# Patient Record
Sex: FEMALE | Race: WHITE | NOT HISPANIC OR LATINO | Employment: OTHER | URBAN - METROPOLITAN AREA
[De-identification: names, ages, dates, MRNs, and addresses within clinical notes are randomized per-mention and may not be internally consistent; named-entity substitution may affect disease eponyms.]

---

## 2017-12-07 ENCOUNTER — CONVERSION ENCOUNTER (OUTPATIENT)
Dept: MAMMOGRAPHY | Facility: HOSPITAL | Age: 67
End: 2017-12-07

## 2019-02-14 ENCOUNTER — HOSPITAL ENCOUNTER (OUTPATIENT)
Dept: MAMMOGRAPHY | Facility: HOSPITAL | Age: 69
Discharge: HOME | End: 2019-02-14

## 2019-07-31 ENCOUNTER — OFFICE VISIT CONVERTED (OUTPATIENT)
Dept: SURGERY | Facility: CLINIC | Age: 69
End: 2019-07-31
Attending: NURSE PRACTITIONER

## 2019-12-06 ENCOUNTER — HOSPITAL ENCOUNTER (OUTPATIENT)
Dept: SURGERY | Facility: HOSPITAL | Age: 69
Setting detail: HOSPITAL OUTPATIENT SURGERY
Discharge: HOME | End: 2019-12-06
Attending: SURGERY

## 2020-02-17 ENCOUNTER — HOSPITAL ENCOUNTER (OUTPATIENT)
Dept: OTHER | Facility: HOSPITAL | Age: 70
Discharge: HOME | End: 2020-02-17
Attending: NURSE PRACTITIONER

## 2021-03-01 ENCOUNTER — HOSPITAL ENCOUNTER (OUTPATIENT)
Dept: VACCINE CLINIC | Facility: HOSPITAL | Age: 71
Discharge: HOME | End: 2021-03-01
Attending: INTERNAL MEDICINE

## 2021-03-22 ENCOUNTER — HOSPITAL ENCOUNTER (OUTPATIENT)
Dept: VACCINE CLINIC | Facility: HOSPITAL | Age: 71
Discharge: HOME | End: 2021-03-22
Attending: INTERNAL MEDICINE

## 2021-04-19 ENCOUNTER — HOSPITAL ENCOUNTER (OUTPATIENT)
Dept: OTHER | Facility: HOSPITAL | Age: 71
Discharge: HOME | End: 2021-04-19
Attending: NURSE PRACTITIONER

## 2021-05-15 VITALS — WEIGHT: 195.37 LBS | BODY MASS INDEX: 31.4 KG/M2 | HEIGHT: 66 IN | RESPIRATION RATE: 14 BRPM

## 2021-11-19 ENCOUNTER — HOSPITAL ENCOUNTER (EMERGENCY)
Facility: HOSPITAL | Age: 71
Discharge: HOME | End: 2021-11-19
Attending: EMERGENCY MEDICINE | Admitting: EMERGENCY MEDICINE

## 2021-11-19 VITALS
SYSTOLIC BLOOD PRESSURE: 128 MMHG | BODY MASS INDEX: 31.89 KG/M2 | TEMPERATURE: 97.8 F | WEIGHT: 198.41 LBS | HEART RATE: 57 BPM | DIASTOLIC BLOOD PRESSURE: 65 MMHG | HEIGHT: 66 IN | OXYGEN SATURATION: 96 % | RESPIRATION RATE: 18 BRPM

## 2021-11-19 DIAGNOSIS — M54.41 ACUTE RIGHT-SIDED LOW BACK PAIN WITH RIGHT-SIDED SCIATICA: Primary | ICD-10-CM

## 2021-11-19 PROCEDURE — 96372 THER/PROPH/DIAG INJ SC/IM: CPT

## 2021-11-19 PROCEDURE — 99283 EMERGENCY DEPT VISIT LOW MDM: CPT

## 2021-11-19 PROCEDURE — 25010000002 HYDROMORPHONE 1 MG/ML SOLUTION: Performed by: EMERGENCY MEDICINE

## 2021-11-19 RX ORDER — GABAPENTIN 300 MG/1
300 CAPSULE ORAL 3 TIMES DAILY
Qty: 9 CAPSULE | Refills: 0 | Status: SHIPPED | OUTPATIENT
Start: 2021-11-19

## 2021-11-19 RX ORDER — GABAPENTIN 300 MG/1
300 CAPSULE ORAL 3 TIMES DAILY
Qty: 63 CAPSULE | Refills: 0 | Status: SHIPPED | OUTPATIENT
Start: 2021-11-19 | End: 2021-11-19 | Stop reason: SDUPTHER

## 2021-11-19 RX ORDER — HYDROCODONE BITARTRATE AND ACETAMINOPHEN 5; 325 MG/1; MG/1
1 TABLET ORAL 2 TIMES DAILY PRN
Qty: 6 TABLET | Refills: 0 | Status: SHIPPED | OUTPATIENT
Start: 2021-11-19

## 2021-11-19 RX ADMIN — HYDROMORPHONE HYDROCHLORIDE 1 MG: 1 INJECTION, SOLUTION INTRAMUSCULAR; INTRAVENOUS; SUBCUTANEOUS at 06:28

## 2021-11-19 NOTE — ED PROVIDER NOTES
Subjective   Katie Rudd is a 71 y.o. female who presents to the emergency department today with complaints of intermittent worsening right hip pain. Per EMS, pt was seen at the beginning of this month by her PCP for sciatic pain where she was prescribed Prednisone and muscle relaxer. She reports taking the last of her medication last night when she then began having experiencing pain again--but worse. She goes on to complain of urinary frequency. She denies fever, numbness, incontinence, or any other pertinent sx or concerns.       History provided by:  Patient   used: No        Review of Systems   Constitutional: Negative for chills and fever.   HENT: Negative for congestion, rhinorrhea and sore throat.    Eyes: Negative for pain and visual disturbance.   Respiratory: Negative for apnea, cough, chest tightness and shortness of breath.    Cardiovascular: Negative for chest pain and palpitations.   Gastrointestinal: Negative for abdominal pain, diarrhea, nausea and vomiting.   Genitourinary: Positive for frequency. Negative for difficulty urinating and dysuria.   Musculoskeletal: Negative for joint swelling and myalgias.   Skin: Negative for color change.   Neurological: Negative for seizures and headaches.   Psychiatric/Behavioral: Negative.    All other systems reviewed and are negative.      Past Medical History:   Diagnosis Date   • Anxiety    • Bladder dysfunction    • Hyperlipidemia    • Hypertension        No Known Allergies    Past Surgical History:   Procedure Laterality Date   • HYSTERECTOMY     • NECK SURGERY     • TONSILLECTOMY     • TUBAL ABDOMINAL LIGATION         History reviewed. No pertinent family history.    Social History     Socioeconomic History   • Marital status:    Tobacco Use   • Smoking status: Never Smoker   • Smokeless tobacco: Never Used   Vaping Use   • Vaping Use: Never used   Substance and Sexual Activity   • Alcohol use: Not Currently   • Drug  use: Never   • Sexual activity: Defer         Objective   Physical Exam  Vitals and nursing note reviewed.   Constitutional:       General: She is not in acute distress.     Appearance: Normal appearance. She is not toxic-appearing.   HENT:      Head: Normocephalic and atraumatic.      Jaw: There is normal jaw occlusion.   Eyes:      General: Lids are normal.      Extraocular Movements: Extraocular movements intact.      Conjunctiva/sclera: Conjunctivae normal.      Pupils: Pupils are equal, round, and reactive to light.   Cardiovascular:      Rate and Rhythm: Normal rate and regular rhythm.      Pulses: Normal pulses.      Heart sounds: Normal heart sounds.   Pulmonary:      Effort: Pulmonary effort is normal. No respiratory distress.      Breath sounds: Normal breath sounds. No wheezing or rhonchi.   Abdominal:      General: Abdomen is flat.      Palpations: Abdomen is soft.      Tenderness: There is no abdominal tenderness. There is no guarding or rebound.   Musculoskeletal:         General: Normal range of motion.      Cervical back: Normal range of motion and neck supple.      Right lower leg: No edema.      Left lower leg: No edema.      Comments: Tender over SI joint.   Skin:     General: Skin is warm and dry.   Neurological:      Mental Status: She is alert and oriented to person, place, and time. Mental status is at baseline.   Psychiatric:         Mood and Affect: Mood normal.         Procedures         ED Course                                            MDM  Number of Diagnoses or Management Options     Amount and/or Complexity of Data Reviewed  Obtain history from someone other than the patient: yes (.)  Review and summarize past medical records: yes (Patient was recently seen by the primary care provider and started on a muscle relaxer and steroids.  These initially helped and so the patient stopped taking them only to see her pain returned a few days later.)      Differential diagnosis includes  sacroiliitis, lumbar fracture, cauda equina syndrome, and sciatica among others.    Pulse oximetry interpretation: 97% SPO2 on room air at rest.  Normal.    Given the lack of numbness weakness bowel or bladder incontinence or saddle anesthesia and given lacking history of trauma I do not think the patient's work-up will require imaging on an acute basis.    On reevaluation the patient's pain was relieved with intramuscular Dilaudid.  The patient and I had a lengthy medication conversation in which she will discontinue her muscle relaxer, finish her steroid, take Norco only as needed for a short course for severe pain, and initiate gabapentin which her PCP will reevaluate in about a week to determine if she needs refills/dosage changes.  We discussed the need for not only medications but also physical therapy which can be arranged through the primary care provider and if that fails a referral to MRI/neurosurgery.  Final diagnoses:   Acute right-sided low back pain with right-sided sciatica       Documentation assistance provided by tyler Menendez.  Information recorded by the tyler was done at my direction and has been verified and validated by me.     Filomena Menendez  11/19/21 0614       Deepak Rodriges DO  11/19/21 0649

## 2022-02-14 ENCOUNTER — TRANSCRIBE ORDERS (OUTPATIENT)
Dept: ADMINISTRATIVE | Facility: HOSPITAL | Age: 72
End: 2022-02-14

## 2022-02-14 DIAGNOSIS — Z12.31 SCREENING MAMMOGRAM, ENCOUNTER FOR: Primary | ICD-10-CM

## 2022-02-14 DIAGNOSIS — Z78.0 POST-MENOPAUSAL: ICD-10-CM

## 2022-04-20 ENCOUNTER — HOSPITAL ENCOUNTER (OUTPATIENT)
Dept: BONE DENSITY | Facility: HOSPITAL | Age: 72
Discharge: HOME | End: 2022-04-20

## 2022-04-20 ENCOUNTER — HOSPITAL ENCOUNTER (OUTPATIENT)
Dept: MAMMOGRAPHY | Facility: HOSPITAL | Age: 72
Discharge: HOME | End: 2022-04-20

## 2022-04-20 DIAGNOSIS — Z12.31 SCREENING MAMMOGRAM, ENCOUNTER FOR: ICD-10-CM

## 2022-04-20 DIAGNOSIS — Z78.0 POST-MENOPAUSAL: ICD-10-CM

## 2022-04-20 PROCEDURE — 77067 SCR MAMMO BI INCL CAD: CPT

## 2022-04-20 PROCEDURE — 77080 DXA BONE DENSITY AXIAL: CPT

## 2023-03-10 ENCOUNTER — TRANSCRIBE ORDERS (OUTPATIENT)
Dept: ADMINISTRATIVE | Facility: HOSPITAL | Age: 73
End: 2023-03-10
Payer: MEDICARE

## 2023-03-10 DIAGNOSIS — Z12.31 SCREENING MAMMOGRAM FOR BREAST CANCER: Primary | ICD-10-CM

## 2023-06-16 ENCOUNTER — TRANSCRIBE ORDERS (OUTPATIENT)
Dept: ADMINISTRATIVE | Facility: HOSPITAL | Age: 73
End: 2023-06-16
Payer: MEDICARE

## 2023-06-16 ENCOUNTER — HOSPITAL ENCOUNTER (OUTPATIENT)
Dept: GENERAL RADIOLOGY | Facility: HOSPITAL | Age: 73
Discharge: HOME | End: 2023-06-16
Payer: MEDICARE

## 2023-06-16 DIAGNOSIS — M25.561 RIGHT KNEE PAIN, UNSPECIFIED CHRONICITY: ICD-10-CM

## 2023-06-16 DIAGNOSIS — M25.561 RIGHT KNEE PAIN, UNSPECIFIED CHRONICITY: Primary | ICD-10-CM

## 2023-06-16 PROCEDURE — 73562 X-RAY EXAM OF KNEE 3: CPT

## 2023-07-27 ENCOUNTER — HOSPITAL ENCOUNTER (EMERGENCY)
Facility: HOSPITAL | Age: 73
Discharge: HOME | End: 2023-07-27
Attending: EMERGENCY MEDICINE
Payer: MEDICARE

## 2023-07-27 ENCOUNTER — APPOINTMENT (OUTPATIENT)
Dept: GENERAL RADIOLOGY | Facility: HOSPITAL | Age: 73
End: 2023-07-27
Payer: MEDICARE

## 2023-07-27 VITALS
WEIGHT: 198.41 LBS | RESPIRATION RATE: 16 BRPM | SYSTOLIC BLOOD PRESSURE: 100 MMHG | HEART RATE: 55 BPM | BODY MASS INDEX: 31.89 KG/M2 | OXYGEN SATURATION: 95 % | TEMPERATURE: 97.9 F | HEIGHT: 66 IN | DIASTOLIC BLOOD PRESSURE: 71 MMHG

## 2023-07-27 DIAGNOSIS — M54.12 CERVICAL RADICULOPATHY: Primary | ICD-10-CM

## 2023-07-27 LAB
ALBUMIN SERPL-MCNC: 4.4 G/DL (ref 3.5–5.2)
ALBUMIN/GLOB SERPL: 1.5 G/DL
ALP SERPL-CCNC: 91 U/L (ref 39–117)
ALT SERPL W P-5'-P-CCNC: 25 U/L (ref 1–33)
ANION GAP SERPL CALCULATED.3IONS-SCNC: 9.5 MMOL/L (ref 5–15)
AST SERPL-CCNC: 17 U/L (ref 1–32)
BASOPHILS # BLD AUTO: 0.01 10*3/MM3 (ref 0–0.2)
BASOPHILS NFR BLD AUTO: 0.1 % (ref 0–1.5)
BILIRUB SERPL-MCNC: 0.8 MG/DL (ref 0–1.2)
BUN SERPL-MCNC: 17 MG/DL (ref 8–23)
BUN/CREAT SERPL: 20.2 (ref 7–25)
CALCIUM SPEC-SCNC: 9.6 MG/DL (ref 8.6–10.5)
CHLORIDE SERPL-SCNC: 102 MMOL/L (ref 98–107)
CO2 SERPL-SCNC: 27.5 MMOL/L (ref 22–29)
CREAT SERPL-MCNC: 0.84 MG/DL (ref 0.57–1)
DEPRECATED RDW RBC AUTO: 47.9 FL (ref 37–54)
EGFRCR SERPLBLD CKD-EPI 2021: 73.5 ML/MIN/1.73
EOSINOPHIL # BLD AUTO: 0.02 10*3/MM3 (ref 0–0.4)
EOSINOPHIL NFR BLD AUTO: 0.2 % (ref 0.3–6.2)
ERYTHROCYTE [DISTWIDTH] IN BLOOD BY AUTOMATED COUNT: 14.7 % (ref 12.3–15.4)
GLOBULIN UR ELPH-MCNC: 2.9 GM/DL
GLUCOSE SERPL-MCNC: 106 MG/DL (ref 65–99)
HCT VFR BLD AUTO: 48.6 % (ref 34–46.6)
HGB BLD-MCNC: 16.2 G/DL (ref 12–15.9)
IMM GRANULOCYTES # BLD AUTO: 0.03 10*3/MM3 (ref 0–0.05)
IMM GRANULOCYTES NFR BLD AUTO: 0.3 % (ref 0–0.5)
LYMPHOCYTES # BLD AUTO: 1.76 10*3/MM3 (ref 0.7–3.1)
LYMPHOCYTES NFR BLD AUTO: 20.5 % (ref 19.6–45.3)
MCH RBC QN AUTO: 29.6 PG (ref 26.6–33)
MCHC RBC AUTO-ENTMCNC: 33.3 G/DL (ref 31.5–35.7)
MCV RBC AUTO: 88.7 FL (ref 79–97)
MONOCYTES # BLD AUTO: 0.49 10*3/MM3 (ref 0.1–0.9)
MONOCYTES NFR BLD AUTO: 5.7 % (ref 5–12)
NEUTROPHILS NFR BLD AUTO: 6.27 10*3/MM3 (ref 1.7–7)
NEUTROPHILS NFR BLD AUTO: 73.2 % (ref 42.7–76)
NRBC BLD AUTO-RTO: 0 /100 WBC (ref 0–0.2)
NT-PROBNP SERPL-MCNC: 184.6 PG/ML (ref 0–900)
PLATELET # BLD AUTO: 205 10*3/MM3 (ref 140–450)
PMV BLD AUTO: 9.6 FL (ref 6–12)
POTASSIUM SERPL-SCNC: 3.9 MMOL/L (ref 3.5–5.2)
PROT SERPL-MCNC: 7.3 G/DL (ref 6–8.5)
RBC # BLD AUTO: 5.48 10*6/MM3 (ref 3.77–5.28)
SODIUM SERPL-SCNC: 139 MMOL/L (ref 136–145)
TROPONIN T SERPL HS-MCNC: 7 NG/L
WBC NRBC COR # BLD: 8.58 10*3/MM3 (ref 3.4–10.8)

## 2023-07-27 PROCEDURE — 36415 COLL VENOUS BLD VENIPUNCTURE: CPT

## 2023-07-27 PROCEDURE — 84484 ASSAY OF TROPONIN QUANT: CPT | Performed by: EMERGENCY MEDICINE

## 2023-07-27 PROCEDURE — 83880 ASSAY OF NATRIURETIC PEPTIDE: CPT | Performed by: EMERGENCY MEDICINE

## 2023-07-27 PROCEDURE — 80053 COMPREHEN METABOLIC PANEL: CPT | Performed by: EMERGENCY MEDICINE

## 2023-07-27 PROCEDURE — 93010 ELECTROCARDIOGRAM REPORT: CPT | Performed by: INTERNAL MEDICINE

## 2023-07-27 PROCEDURE — 99282 EMERGENCY DEPT VISIT SF MDM: CPT

## 2023-07-27 PROCEDURE — 93005 ELECTROCARDIOGRAM TRACING: CPT | Performed by: EMERGENCY MEDICINE

## 2023-07-27 PROCEDURE — 72050 X-RAY EXAM NECK SPINE 4/5VWS: CPT

## 2023-07-27 PROCEDURE — 93005 ELECTROCARDIOGRAM TRACING: CPT

## 2023-07-27 PROCEDURE — 85025 COMPLETE CBC W/AUTO DIFF WBC: CPT | Performed by: EMERGENCY MEDICINE

## 2023-07-27 RX ORDER — MELOXICAM 7.5 MG/1
1 TABLET ORAL DAILY
COMMUNITY
Start: 2023-06-07

## 2023-07-27 RX ORDER — PREDNISONE 50 MG/1
50 TABLET ORAL DAILY
Qty: 5 TABLET | Refills: 0 | Status: SHIPPED | OUTPATIENT
Start: 2023-07-27 | End: 2023-08-01

## 2023-07-27 RX ORDER — ATORVASTATIN CALCIUM 20 MG/1
20 TABLET, FILM COATED ORAL DAILY
COMMUNITY

## 2023-07-27 RX ORDER — AMLODIPINE BESYLATE 5 MG/1
5 TABLET ORAL DAILY
COMMUNITY

## 2023-07-27 RX ORDER — OXYBUTYNIN CHLORIDE 10 MG/1
1 TABLET, EXTENDED RELEASE ORAL DAILY
COMMUNITY
Start: 2023-06-07

## 2023-07-27 NOTE — DISCHARGE INSTRUCTIONS
Return to the emergency department as needed for worsening of symptoms.  Today your cardiac work-up shows no evidence of heart attack.

## 2023-07-27 NOTE — ED PROVIDER NOTES
"Time: 1:38 PM EDT  Date of encounter:  7/27/2023  Independent Historian/Clinical History and Information was obtained by:   Patient    History is limited by: N/A    Chief Complaint   Patient presents with    Nausea    Neck Pain         History of Present Illness:  Patient is a 73 y.o. year old female who presents to the emergency department for evaluation of neck pain that started 3 days ago.  Patient states her pain radiates down to her left upper extremity admits numbness and tingling with a burning sensation.  Patient states the pain is intermittent.  Patient was seen by her PCP earlier today and her PCP wanted her to be worked up for possible MI.  Patient denies any chest pain or shortness of breath at this time.  Patient admits to nausea but denies vomiting.  Patient states that she does have occasional headache whenever her neck pain gets worse.  Patient denies fever.  Patient denies neck stiffness.  Patient denies ever having left upper extremity weakness and currently has no numbness tingling.  She states \"right now my arm is fine\".    HPI    Patient Care Team  Primary Care Provider: Jesu Tierney MD    Past Medical History:     No Known Allergies  Past Medical History:   Diagnosis Date    Anxiety     Bladder dysfunction     Hyperlipidemia     Hypertension      Past Surgical History:   Procedure Laterality Date    BREAST BIOPSY Left     HYSTERECTOMY      NECK SURGERY      TONSILLECTOMY      TUBAL ABDOMINAL LIGATION       Family History   Problem Relation Age of Onset    Breast cancer Mother        Home Medications:  Prior to Admission medications    Medication Sig Start Date End Date Taking? Authorizing Provider   meloxicam (MOBIC) 7.5 MG tablet Take 1 tablet by mouth Daily. 6/7/23  Yes Provider, MD [place]   oxybutynin XL (DITROPAN-XL) 10 MG 24 hr tablet Take 1 tablet by mouth Daily. 6/7/23  Yes Provider, MD [place]   amLODIPine (NORVASC) 5 MG tablet Take 1 tablet by mouth Daily.    Provider, " "MD [place]   atorvastatin (LIPITOR) 20 MG tablet Take 1 tablet by mouth Daily.    Provider, MD [place]   sertraline (ZOLOFT) 50 MG tablet Take 1 tablet by mouth Daily.    Provider, MD [place]   gabapentin (NEURONTIN) 300 MG capsule Take 1 capsule by mouth 3 (Three) Times a Day. 11/19/21 7/27/23  Deepak Rodriges DO   HYDROcodone-acetaminophen (NORCO) 5-325 MG per tablet Take 1 tablet by mouth 2 (Two) Times a Day As Needed for Severe Pain . 11/19/21 7/27/23  Deepak Rodriges DO        Social History:   Social History     Tobacco Use    Smoking status: Never    Smokeless tobacco: Never   Vaping Use    Vaping Use: Never used   Substance Use Topics    Alcohol use: Not Currently    Drug use: Never         Review of Systems:  Review of Systems   Constitutional:  Negative for chills and fever.   HENT:  Negative for congestion, rhinorrhea and sore throat.    Eyes:  Negative for photophobia.   Respiratory:  Negative for apnea, cough, chest tightness and shortness of breath.    Cardiovascular:  Negative for chest pain and palpitations.   Gastrointestinal:  Negative for abdominal pain, diarrhea, nausea and vomiting.   Endocrine: Negative.    Genitourinary:  Negative for difficulty urinating and dysuria.   Musculoskeletal:  Positive for neck pain. Negative for back pain, joint swelling and myalgias.   Skin:  Negative for color change and wound.   Allergic/Immunologic: Negative.    Neurological:  Positive for numbness. Negative for seizures, weakness and headaches.   Psychiatric/Behavioral: Negative.     All other systems reviewed and are negative.     Physical Exam:  /71   Pulse 55   Temp 97.9 °F (36.6 °C) (Oral)   Resp 16   Ht 167.6 cm (66\")   Wt 90 kg (198 lb 6.6 oz)   SpO2 95%   BMI 32.02 kg/m²         Physical Exam  Vitals and nursing note reviewed.   Constitutional:       General: She is awake.      Appearance: Normal appearance.   HENT:      Head: Normocephalic and atraumatic.      Nose: Nose " normal.      Mouth/Throat:      Mouth: Mucous membranes are moist.   Eyes:      Extraocular Movements: Extraocular movements intact.      Pupils: Pupils are equal, round, and reactive to light.   Cardiovascular:      Rate and Rhythm: Normal rate and regular rhythm.      Heart sounds: Normal heart sounds.   Pulmonary:      Effort: Pulmonary effort is normal. No respiratory distress.      Breath sounds: Normal breath sounds. No wheezing, rhonchi or rales.   Abdominal:      General: Bowel sounds are normal.      Palpations: Abdomen is soft.      Tenderness: There is no abdominal tenderness. There is no guarding or rebound.      Comments: No rigidity   Musculoskeletal:         General: No tenderness. Normal range of motion.      Cervical back: Normal range of motion and neck supple.   Skin:     General: Skin is warm and dry.      Coloration: Skin is not jaundiced.   Neurological:      General: No focal deficit present.      Mental Status: She is alert and oriented to person, place, and time. Mental status is at baseline.      Sensory: Sensation is intact.      Motor: Motor function is intact.      Coordination: Coordination is intact.   Psychiatric:         Attention and Perception: Attention and perception normal.         Mood and Affect: Mood and affect normal.         Speech: Speech normal.         Behavior: Behavior normal.         Judgment: Judgment normal.                    Procedures:  Procedures      Medical Decision Making:      Comorbidities that affect care:    Hypertension, hyperlipidemia, anxiety    External Notes reviewed:    None      The following orders were placed and all results were independently analyzed by me:  Orders Placed This Encounter   Procedures    XR Spine Cervical Complete 4 or 5 View    Comprehensive Metabolic Panel    BNP    Single High Sensitivity Troponin T    CBC Auto Differential    ECG 12 Lead Other; left arm pain    CBC & Differential       Medications Given in the Emergency  Department:  Medications - No data to display     ED Course:    The patient was initially evaluated in the triage area where orders were placed. The patient was later dispositioned by Héctor Clarke MD.      The patient was advised to stay for completion of workup which includes but is not limited to communication of labs and radiological results, reassessment and plan. The patient was advised that leaving prior to disposition by a provider could result in critical findings that are not communicated to the patient.     ED Course as of 07/27/23 1513   Thu Jul 27, 2023   1507 I have personally interpreted the EKG today and it shows no evidence of any acute ischemia or heart arrhythmia. [RP]      ED Course User Index  [RP] Héctor Clarke MD       Labs:    Lab Results (last 24 hours)       Procedure Component Value Units Date/Time    CBC & Differential [[id number]]  (Abnormal) Collected: 07/27/23 1335    Specimen: Blood Updated: 07/27/23 1352    Narrative:      The following orders were created for panel order CBC & Differential.  Procedure                               Abnormality         Status                     ---------                               -----------         ------                     CBC Auto Differential[[id number]]        Abnormal            Final result                 Please view results for these tests on the individual orders.    Comprehensive Metabolic Panel [[id number]]  (Abnormal) Collected: 07/27/23 1335    Specimen: Blood Updated: 07/27/23 1410     Glucose 106 mg/dL      BUN 17 mg/dL      Creatinine 0.84 mg/dL      Sodium 139 mmol/L      Potassium 3.9 mmol/L      Chloride 102 mmol/L      CO2 27.5 mmol/L      Calcium 9.6 mg/dL      Total Protein 7.3 g/dL      Albumin 4.4 g/dL      ALT (SGPT) 25 U/L      AST (SGOT) 17 U/L      Alkaline Phosphatase 91 U/L      Total Bilirubin 0.8 mg/dL      Globulin 2.9 gm/dL      A/G Ratio 1.5 g/dL      BUN/Creatinine Ratio 20.2     Anion Gap 9.5 mmol/L       eGFR 73.5 mL/min/1.73     Narrative:      GFR Normal >60  Chronic Kidney Disease <60  Kidney Failure <15    The GFR formula is only valid for adults with stable renal function between ages 18 and 70.    BNP [[id number]]  (Normal) Collected: 07/27/23 1335    Specimen: Blood Updated: 07/27/23 1409     proBNP 184.6 pg/mL     Narrative:      Among patients with dyspnea, NT-proBNP is highly sensitive for the detection of acute congestive heart failure. In addition NT-proBNP of <300 pg/ml effectively rules out acute congestive heart failure with 99% negative predictive value.      Single High Sensitivity Troponin T [[id number]]  (Normal) Collected: 07/27/23 1335    Specimen: Blood Updated: 07/27/23 1410     HS Troponin T 7 ng/L     Narrative:      High Sensitive Troponin T Reference Range:  <10.0 ng/L- Negative Female for AMI  <15.0 ng/L- Negative Male for AMI  >=10 - Abnormal Female indicating possible myocardial injury.  >=15 - Abnormal Male indicating possible myocardial injury.   Clinicians would have to utilize clinical acumen, EKG, Troponin, and serial changes to determine if it is an Acute Myocardial Infarction or myocardial injury due to an underlying chronic condition.         CBC Auto Differential [[id number]]  (Abnormal) Collected: 07/27/23 1335    Specimen: Blood Updated: 07/27/23 1352     WBC 8.58 10*3/mm3      RBC 5.48 10*6/mm3      Hemoglobin 16.2 g/dL      Hematocrit 48.6 %      MCV 88.7 fL      MCH 29.6 pg      MCHC 33.3 g/dL      RDW 14.7 %      RDW-SD 47.9 fl      MPV 9.6 fL      Platelets 205 10*3/mm3      Neutrophil % 73.2 %      Lymphocyte % 20.5 %      Monocyte % 5.7 %      Eosinophil % 0.2 %      Basophil % 0.1 %      Immature Grans % 0.3 %      Neutrophils, Absolute 6.27 10*3/mm3      Lymphocytes, Absolute 1.76 10*3/mm3      Monocytes, Absolute 0.49 10*3/mm3      Eosinophils, Absolute 0.02 10*3/mm3      Basophils, Absolute 0.01 10*3/mm3      Immature Grans, Absolute 0.03 10*3/mm3      nRBC 0.0  /100 WBC              Imaging:    XR Spine Cervical Complete 4 or 5 View    Result Date: 7/27/2023  PROCEDURE: XR SPINE CERVICAL COMPLETE 4 OR 5 VW  COMPARISON: [facility], , C-SPINE 2 OR 3 VIEWS, 7/13/2016, 14:17.  INDICATIONS: WORSENING NECK PAIN X FEW DAYS - HISTORY OF CERVICAL FUSION - NO INJURY  FINDINGS:  The lateral masses of C1 are symmetrically aligned on the lateral masses of C2.  The dens is intact.  There is normal AP and medial lateral alignment of the cervical spine.  There is anterior cervical discectomy and fusion at the C5-C6 level.  There is endplate osseous spurring at the C3-C4, C4-C5, and C6-C7 level.  There is bilateral degenerative facet arthropathy.  Mild osseous neural foraminal narrowing at the C4-C5 level bilaterally.  The spinous processes appear intact.  Prevertebral soft tissues appear normal in thickness.        1. Postsurgical changes of anterior cervical discectomy and fusion at C5-C6. 2. Degenerative endplate osseous spurring at C3-C4, C4-C5, and C6-C7.  Mild uncovertebral spurring with mild bilateral neural foraminal narrowing at C4-C5.      ALAN CARRASCO MD       Electronically Signed and Approved By: ALAN CARRASCO MD on 7/27/2023 at 14:38                Differential Diagnosis and Discussion:      Extremity Pain: Differential diagnosis includes but is not limited to soft tissue sprain, tendonitis, tendon injury, dislocation, fracture, deep vein thrombosis, arterial insufficiency, osteoarthritis, bursitis, and ligamentous damage.    All labs were reviewed and interpreted by me.  All X-rays impressions were independently interpreted by me.  EKG was interpreted by me.    [facility]           Patient Care Considerations:    MRI: I considered ordering an MRI however patient has no focal neurologic deficits.      Consultants/Shared Management Plan:    None    Social Determinants of Health:    Patient is independent, reliable, and has access to care.       Disposition and  Care Coordination:    Discharged: The patient is suitable and stable for discharge with no need for consideration of observation or admission.    I have explained the patient´s condition, diagnoses and treatment plan based on the information available to me at this time. I have answered questions and addressed any concerns. The patient has a good  understanding of the patient´s diagnosis, condition, and treatment plan as can be expected at this point. The vital signs have been stable. The patient´s condition is stable and appropriate for discharge from the emergency department.      The patient will pursue further outpatient evaluation with the primary care physician or other designated or consulting physician as outlined in the discharge instructions. They are agreeable to this plan of care and follow-up instructions have been explained in detail. The patient has received these instructions in written format and have expressed an understanding of the discharge instructions. The patient is aware that any significant change in condition or worsening of symptoms should prompt an immediate return to this or the closest emergency department or call to 911.    Final diagnoses:   Cervical radiculopathy        ED Disposition       ED Disposition   Discharge    Condition   Stable    Comment   --               This medical record created using voice recognition software.             Héctor Clarke MD  07/27/23 5367

## 2023-07-29 LAB — QT INTERVAL: 421 MS

## 2023-08-21 ENCOUNTER — PREP FOR SURGERY (OUTPATIENT)
Dept: OTHER | Facility: HOSPITAL | Age: 73
End: 2023-08-21
Payer: MEDICARE

## 2023-08-21 ENCOUNTER — OFFICE VISIT (OUTPATIENT)
Dept: SURGERY | Facility: CLINIC | Age: 73
End: 2023-08-21
Payer: MEDICARE

## 2023-08-21 VITALS — HEART RATE: 66 BPM | HEIGHT: 66 IN | BODY MASS INDEX: 31.5 KG/M2 | WEIGHT: 196 LBS

## 2023-08-21 DIAGNOSIS — Z12.11 SCREENING FOR MALIGNANT NEOPLASM OF COLON: Primary | ICD-10-CM

## 2023-08-21 DIAGNOSIS — Z86.010 HISTORY OF COLONIC POLYPS: ICD-10-CM

## 2023-08-21 PROBLEM — Z86.0100 HISTORY OF COLONIC POLYPS: Status: ACTIVE | Noted: 2023-08-21

## 2023-08-21 PROCEDURE — 1160F RVW MEDS BY RX/DR IN RCRD: CPT | Performed by: NURSE PRACTITIONER

## 2023-08-21 PROCEDURE — 1159F MED LIST DOCD IN RCRD: CPT | Performed by: NURSE PRACTITIONER

## 2023-08-21 PROCEDURE — S0260 H&P FOR SURGERY: HCPCS | Performed by: NURSE PRACTITIONER

## 2023-08-21 RX ORDER — SODIUM CHLORIDE 0.9 % (FLUSH) 0.9 %
3 SYRINGE (ML) INJECTION EVERY 12 HOURS SCHEDULED
OUTPATIENT
Start: 2023-08-21

## 2023-08-21 RX ORDER — SODIUM CHLORIDE 0.9 % (FLUSH) 0.9 %
10 SYRINGE (ML) INJECTION AS NEEDED
OUTPATIENT
Start: 2023-08-21

## 2023-08-21 RX ORDER — SODIUM CHLORIDE 9 MG/ML
40 INJECTION, SOLUTION INTRAVENOUS AS NEEDED
OUTPATIENT
Start: 2023-08-21

## 2023-08-21 RX ORDER — SODIUM PICOSULFATE, MAGNESIUM OXIDE, AND ANHYDROUS CITRIC ACID 10; 3.5; 12 MG/160ML; G/160ML; G/160ML
160 LIQUID ORAL ONCE
Qty: 2 ML | Refills: 0 | Status: SHIPPED | OUTPATIENT
Start: 2023-08-21 | End: 2023-08-21

## 2023-08-21 NOTE — PROGRESS NOTES
Chief Complaint: Colonoscopy (consult)    Subjective      Colonoscopy consultation       History of Present Illness  Katie Rudd is a 73 y.o. female presents to [facility] for colonoscopy consultation.    Patient presents today without complaints for screening colonoscopy.  She denies any abdominal pain, change in bowel habit, or rectal bleeding.  Denies any family history of colorectal cancer.  Admits to history of colonic polyps.    12/19: Colonoscopy ([facility]):  Ascending - tubular adenoma; Descending: tubular adenoma.     5/17: Colonoscopy ([place]): Ascending-tubular adenoma; hepatic flexure-tubular adenoma; transverse- 2 tubular adenomas.  All stool studies normal.    3/15: Colonoscopy ([place]): Ascending-tubular adenoma; cecum-tubular adenoma; diverticulosis; Hemorrhoids.     8/04: Colonoscopy ([place]): Diverticulosis; chronic hemorrhoid disease.   Objective     Past Medical History:   Diagnosis Date    Anxiety     Bladder dysfunction     Hyperlipidemia     Hypertension        Past Surgical History:   Procedure Laterality Date    BREAST BIOPSY Left     HYSTERECTOMY      NECK SURGERY      TONSILLECTOMY      TUBAL ABDOMINAL LIGATION         Outpatient Medications Marked as Taking for the 8/21/23 encounter (Office Visit) with Eleanor Hunter APRN   Medication Sig Dispense Refill    amLODIPine (NORVASC) 5 MG tablet Take 1 tablet by mouth Daily.      atorvastatin (LIPITOR) 20 MG tablet Take 1 tablet by mouth Daily.      meloxicam (MOBIC) 7.5 MG tablet Take 1 tablet by mouth Daily.      oxybutynin XL (DITROPAN-XL) 10 MG 24 hr tablet Take 1 tablet by mouth Daily.      sertraline (ZOLOFT) 50 MG tablet Take 1 tablet by mouth Daily.         No Known Allergies     Family History   Problem Relation Age of Onset    Breast cancer Mother        Social History     Socioeconomic History    Marital status:    Tobacco Use    Smoking status: Never    Smokeless tobacco: Never   Vaping  "Use    Vaping Use: Never used   Substance and Sexual Activity    Alcohol use: Not Currently    Drug use: Never    Sexual activity: Defer       Review of Systems   Constitutional:  Negative for chills and fever.   Gastrointestinal:  Negative for abdominal distention, abdominal pain, anal bleeding, blood in stool, constipation, diarrhea and rectal pain.      Vital Signs:   Pulse 66   Ht 167.6 cm (66\")   Wt 88.9 kg (196 lb)   BMI 31.64 kg/mý      Physical Exam  Vitals and nursing note reviewed.   Constitutional:       General: She is not in acute distress.     Appearance: Normal appearance.   HENT:      Head: Normocephalic.   Cardiovascular:      Rate and Rhythm: Normal rate.   Pulmonary:      Effort: Pulmonary effort is normal.      Breath sounds: No stridor.   Abdominal:      Palpations: Abdomen is soft.      Tenderness: There is no guarding.   Musculoskeletal:         General: No deformity. Normal range of motion.   Skin:     General: Skin is warm and dry.      Coloration: Skin is not jaundiced.   Neurological:      General: No focal deficit present.      Mental Status: She is alert and oriented to person, place, and time.   Psychiatric:         Mood and Affect: Mood normal.         Thought Content: Thought content normal.        Result Review :          []  Laboratory  []  Radiology  []  Pathology  []  Microbiology  []  EKG/Telemetry   []  Cardiology/Vascular   []  Old records  I spent 15 minutes caring for kirsten on this date of service. This time includes time spent by me in the following activities: reviewing tests, obtaining and/or reviewing a separately obtained history, performing a medically appropriate examination and/or evaluation, ordering medications, tests, or procedures, and documenting information in the medical record.     Assessment and Plan    Diagnoses and all orders for this visit:    1. Screening for malignant neoplasm of colon (Primary)    2. History of colonic polyps    Other orders  -     " Sod Picosulfate-Mag Ox-Cit Acd (Clenpiq) 10-3.5-12 MG-GM -GM/160ML solution; Take 160 mL by mouth 1 (One) Time for 1 dose.  Dispense: 2 mL; Refill: 0        Follow Up   Return for Schedule colonoscopy with Dr. Garcia on 1/24/2023 at [facility].    Hospital arrival time: 0600.    Possible risks/complications, benefits, and alternatives to surgical or invasive procedures have been explained to patient and/or legal guardian.    Patient has been evaluated and can tolerate anesthesia and/or sedation. Risks, benefits, and alternatives to anesthesia and sedation have been explained to the patient and/or legal guardian. Patient verbalizes understanding and is willing to proceed with the above plan.     Patient was given instructions and counseling regarding her condition or for health maintenance advice. Please see specific information pulled into the AVS if appropriate.

## 2023-09-01 ENCOUNTER — TELEPHONE (OUTPATIENT)
Dept: SURGERY | Facility: CLINIC | Age: 73
End: 2023-09-01
Payer: MEDICARE

## 2023-09-01 NOTE — TELEPHONE ENCOUNTER
[facility] CALLED, THEY DO NOT HAVE THE CLENPIQ WE SENT IN . CAN YOU CALL THE PATIENT WITH ANOTHER BOWEL PREP?

## 2023-09-06 RX ORDER — POLYETHYLENE GLYCOL 3350 17 G/17G
POWDER, FOR SOLUTION ORAL
Qty: 238 PACKET | Refills: 0 | Status: SHIPPED | OUTPATIENT
Start: 2023-09-06

## 2023-09-06 NOTE — TELEPHONE ENCOUNTER
I tried calling pt to let her know. No answer. No VM that picked up either. I will send her the Miralax bowel prep in the mail.

## 2024-01-17 NOTE — PRE-PROCEDURE INSTRUCTIONS
"Instructed on date and arrival time of 0600. Come to entrance \"C\". Must have  over age 18 to drive home.  May have two visitors; however, children under 12 must stay in waiting room.  Discussed clear liquid diet (no red or purple), bowel prep, and NPO.  May take medications as usual except for blood thinners, diabetic medications, and weight loss medications.  Bring list of medications.  Verbalized understanding of instructions given.  Instructed to call for questions or concerns.  "

## 2024-01-23 ENCOUNTER — ANESTHESIA EVENT (OUTPATIENT)
Dept: GASTROENTEROLOGY | Facility: HOSPITAL | Age: 74
End: 2024-01-23
Payer: MEDICARE

## 2024-01-23 NOTE — ANESTHESIA PREPROCEDURE EVALUATION
Anesthesia Evaluation     Patient summary reviewed and Nursing notes reviewed   NPO Solid Status: > 8 hours  NPO Liquid Status: > 4 hours           Airway   Mallampati: I  TM distance: >3 FB  Neck ROM: full  No difficulty expected  Dental - normal exam     Pulmonary     breath sounds clear to auscultation  Cardiovascular   Exercise tolerance: good (4-7 METS)    ECG reviewed  Rhythm: regular  Rate: normal    (+) hypertension well controlled, hyperlipidemia      Neuro/Psych  (+) psychiatric history Anxiety  GI/Hepatic/Renal/Endo      Musculoskeletal     Abdominal    Substance History      OB/GYN          Other        ROS/Med Hx Other: EKG 07/29/23: HR 55  Sinus rhythm  Abnormal R-wave progression, late transition  Inferior infarct, old                    Anesthesia Plan    ASA 2     general   total IV anesthesia  (Total IV Anesthesia    Patient understands anesthesia not responsible for dental damage.  )  intravenous induction     Anesthetic plan, risks, benefits, and alternatives have been provided, discussed and informed consent has been obtained with: patient and spouse/significant other.  Pre-procedure education provided  Plan discussed with CRNA.    CODE STATUS:

## 2024-01-24 ENCOUNTER — HOSPITAL ENCOUNTER (OUTPATIENT)
Facility: HOSPITAL | Age: 74
Setting detail: HOSPITAL OUTPATIENT SURGERY
Discharge: HOME | End: 2024-01-24
Attending: SURGERY | Admitting: SURGERY
Payer: MEDICARE

## 2024-01-24 ENCOUNTER — ANESTHESIA (OUTPATIENT)
Dept: GASTROENTEROLOGY | Facility: HOSPITAL | Age: 74
End: 2024-01-24
Payer: MEDICARE

## 2024-01-24 VITALS
HEART RATE: 64 BPM | OXYGEN SATURATION: 100 % | DIASTOLIC BLOOD PRESSURE: 95 MMHG | BODY MASS INDEX: 31.38 KG/M2 | RESPIRATION RATE: 17 BRPM | SYSTOLIC BLOOD PRESSURE: 142 MMHG | WEIGHT: 194.45 LBS | TEMPERATURE: 97.2 F

## 2024-01-24 DIAGNOSIS — Z12.11 SCREENING FOR MALIGNANT NEOPLASM OF COLON: ICD-10-CM

## 2024-01-24 DIAGNOSIS — Z86.010 HISTORY OF COLONIC POLYPS: ICD-10-CM

## 2024-01-24 PROBLEM — Z86.0100 HISTORY OF COLONIC POLYPS: Status: RESOLVED | Noted: 2023-08-21 | Resolved: 2024-01-24

## 2024-01-24 PROCEDURE — 25010000002 PROPOFOL 10 MG/ML EMULSION: Performed by: NURSE ANESTHETIST, CERTIFIED REGISTERED

## 2024-01-24 PROCEDURE — 25810000003 LACTATED RINGERS PER 1000 ML

## 2024-01-24 PROCEDURE — 88305 TISSUE EXAM BY PATHOLOGIST: CPT | Performed by: SURGERY

## 2024-01-24 RX ORDER — SODIUM CHLORIDE, SODIUM LACTATE, POTASSIUM CHLORIDE, CALCIUM CHLORIDE 600; 310; 30; 20 MG/100ML; MG/100ML; MG/100ML; MG/100ML
30 INJECTION, SOLUTION INTRAVENOUS CONTINUOUS
Status: DISCONTINUED | OUTPATIENT
Start: 2024-01-24 | End: 2024-01-24 | Stop reason: HOSPADM

## 2024-01-24 RX ORDER — SODIUM CHLORIDE 0.9 % (FLUSH) 0.9 %
3 SYRINGE (ML) INJECTION EVERY 12 HOURS SCHEDULED
Status: DISCONTINUED | OUTPATIENT
Start: 2024-01-24 | End: 2024-01-24 | Stop reason: HOSPADM

## 2024-01-24 RX ORDER — LIDOCAINE HYDROCHLORIDE 20 MG/ML
INJECTION, SOLUTION EPIDURAL; INFILTRATION; INTRACAUDAL; PERINEURAL AS NEEDED
Status: DISCONTINUED | OUTPATIENT
Start: 2024-01-24 | End: 2024-01-24 | Stop reason: SURG

## 2024-01-24 RX ORDER — PROPOFOL 10 MG/ML
VIAL (ML) INTRAVENOUS AS NEEDED
Status: DISCONTINUED | OUTPATIENT
Start: 2024-01-24 | End: 2024-01-24 | Stop reason: SURG

## 2024-01-24 RX ORDER — SODIUM CHLORIDE 0.9 % (FLUSH) 0.9 %
10 SYRINGE (ML) INJECTION AS NEEDED
Status: DISCONTINUED | OUTPATIENT
Start: 2024-01-24 | End: 2024-01-24 | Stop reason: HOSPADM

## 2024-01-24 RX ORDER — EPHEDRINE SULFATE 50 MG/ML
INJECTION, SOLUTION INTRAVENOUS AS NEEDED
Status: DISCONTINUED | OUTPATIENT
Start: 2024-01-24 | End: 2024-01-24 | Stop reason: SURG

## 2024-01-24 RX ORDER — SODIUM CHLORIDE 9 MG/ML
40 INJECTION, SOLUTION INTRAVENOUS AS NEEDED
Status: DISCONTINUED | OUTPATIENT
Start: 2024-01-24 | End: 2024-01-24 | Stop reason: HOSPADM

## 2024-01-24 RX ADMIN — LIDOCAINE HYDROCHLORIDE 100 MG: 20 INJECTION, SOLUTION EPIDURAL; INFILTRATION; INTRACAUDAL; PERINEURAL at 07:40

## 2024-01-24 RX ADMIN — PROPOFOL 50 MG: 10 INJECTION, EMULSION INTRAVENOUS at 08:09

## 2024-01-24 RX ADMIN — PROPOFOL 200 MCG/KG/MIN: 10 INJECTION, EMULSION INTRAVENOUS at 07:40

## 2024-01-24 RX ADMIN — EPHEDRINE SULFATE 10 MG: 50 INJECTION INTRAVENOUS at 07:50

## 2024-01-24 RX ADMIN — PROPOFOL 70 MG: 10 INJECTION, EMULSION INTRAVENOUS at 07:40

## 2024-01-24 RX ADMIN — SODIUM CHLORIDE, POTASSIUM CHLORIDE, SODIUM LACTATE AND CALCIUM CHLORIDE 30 ML/HR: 600; 310; 30; 20 INJECTION, SOLUTION INTRAVENOUS at 06:56

## 2024-01-24 NOTE — ANESTHESIA POSTPROCEDURE EVALUATION
Patient: Katie Rudd    Procedure Summary       Date: 01/24/24 Room / Location: [facility] ENDOSCOPY 1 / [facility] ENDOSCOPY    Anesthesia Start: 0738 Anesthesia Stop: 0817    Procedure: COLONOSCOPY WITH COLD/HOT SNARE AND BIOPSY Diagnosis:       Screening for malignant neoplasm of colon      History of colonic polyps      (Screening for malignant neoplasm of colon [Z12.11])      (History of colonic polyps [Z86.010])    Surgeons: Giorgio Garcia MD Provider: Dave Lee CRNA    Anesthesia Type: general ASA Status: 2            Anesthesia Type: general    Vitals  Vitals Value Taken Time   /95 01/24/24 0834   Temp 36.2 °C (97.2 °F) 01/24/24 0832   Pulse 61 01/24/24 0834   Resp 17 01/24/24 0832   SpO2 100 % 01/24/24 0834   Vitals shown include unfiled device data.        Post Anesthesia Care and Evaluation    Patient location during evaluation: bedside  Patient participation: complete - patient participated  Level of consciousness: awake  Pain management: adequate    Airway patency: patent  Anesthetic complications: No anesthetic complications  PONV Status: controlled  Cardiovascular status: acceptable and stable  Respiratory status: acceptable

## 2024-01-24 NOTE — H&P
Chief Complaint: Colonoscopy (consult)    Subjective      Colonoscopy consultation       History of Present Illness  Katie Rudd is a 73 y.o. female presents to [facility] for colonoscopy consultation.    Patient presents today without complaints for screening colonoscopy.  She denies any abdominal pain, change in bowel habit, or rectal bleeding.  Denies any family history of colorectal cancer.  Admits to history of colonic polyps.    12/19: Colonoscopy (Garcia):  Ascending - tubular adenoma; Descending: tubular adenoma.     5/17: Colonoscopy ([place]): Ascending-tubular adenoma; hepatic flexure-tubular adenoma; transverse- 2 tubular adenomas.  All stool studies normal.    3/15: Colonoscopy ([place]): Ascending-tubular adenoma; cecum-tubular adenoma; diverticulosis; Hemorrhoids.     8/04: Colonoscopy ([place]): Diverticulosis; chronic hemorrhoid disease.   Objective     Past Medical History:   Diagnosis Date    Anxiety     Bladder dysfunction     Hyperlipidemia     Hypertension        Past Surgical History:   Procedure Laterality Date    BREAST BIOPSY Left     HYSTERECTOMY      NECK SURGERY      TONSILLECTOMY      TUBAL ABDOMINAL LIGATION         Outpatient Medications Marked as Taking for the 8/21/23 encounter (Office Visit) with Eleanor Hunter APRN   Medication Sig Dispense Refill    amLODIPine (NORVASC) 5 MG tablet Take 1 tablet by mouth Daily.      atorvastatin (LIPITOR) 20 MG tablet Take 1 tablet by mouth Daily.      meloxicam (MOBIC) 7.5 MG tablet Take 1 tablet by mouth Daily.      oxybutynin XL (DITROPAN-XL) 10 MG 24 hr tablet Take 1 tablet by mouth Daily.      sertraline (ZOLOFT) 50 MG tablet Take 1 tablet by mouth Daily.         No Known Allergies     Family History   Problem Relation Age of Onset    Breast cancer Mother        Social History     Socioeconomic History    Marital status:    Tobacco Use    Smoking status: Never    Smokeless tobacco: Never   Vaping  Use    Vaping Use: Never used   Substance and Sexual Activity    Alcohol use: Not Currently    Drug use: Never    Sexual activity: Defer       Physical Exam  Vitals - Available in the EMR.  Reviewed.  Respiratory:  breathing not labored, respiratory effort appears normal  Cardiovascular:  heart regular rate  Skin and subcutaneous tissue:  warm and dry  Musculoskeletal: moving all extremities symmetrically and purposefully  Neurologic:  no obvious motor or sensory deficits, speech clear  Psychiatric:  judgment and insight intact, mood normal      Assessment   Personal history of colon polyps    Plan  Colonoscopy    Risks and benefits discussed    Giorgio Garcia M.D.  01/24/24    Electronically signed by Giorgio Garcia MD, 01/24/24, 7:05 AM EST.

## 2024-01-25 LAB
CYTO UR: NORMAL
LAB AP CASE REPORT: NORMAL
LAB AP CLINICAL INFORMATION: NORMAL
PATH REPORT.FINAL DX SPEC: NORMAL
PATH REPORT.GROSS SPEC: NORMAL

## 2024-04-25 ENCOUNTER — OFFICE VISIT (OUTPATIENT)
Dept: ORTHOPEDIC SURGERY | Facility: CLINIC | Age: 74
End: 2024-04-25
Payer: MEDICARE

## 2024-04-25 VITALS
HEART RATE: 70 BPM | OXYGEN SATURATION: 96 % | BODY MASS INDEX: 31.82 KG/M2 | DIASTOLIC BLOOD PRESSURE: 75 MMHG | HEIGHT: 66 IN | SYSTOLIC BLOOD PRESSURE: 169 MMHG | WEIGHT: 198 LBS

## 2024-04-25 DIAGNOSIS — M17.0 OSTEOARTHRITIS OF BOTH KNEES, UNSPECIFIED OSTEOARTHRITIS TYPE: ICD-10-CM

## 2024-04-25 DIAGNOSIS — M25.562 LEFT KNEE PAIN, UNSPECIFIED CHRONICITY: ICD-10-CM

## 2024-04-25 DIAGNOSIS — M25.561 RIGHT KNEE PAIN, UNSPECIFIED CHRONICITY: Primary | ICD-10-CM

## 2024-04-25 RX ORDER — TRIAMCINOLONE ACETONIDE 40 MG/ML
40 INJECTION, SUSPENSION INTRA-ARTICULAR; INTRAMUSCULAR
Status: COMPLETED | OUTPATIENT
Start: 2024-04-25 | End: 2024-04-25

## 2024-04-25 RX ORDER — DICLOFENAC SODIUM 75 MG/1
75 TABLET, DELAYED RELEASE ORAL 2 TIMES DAILY
COMMUNITY

## 2024-04-25 RX ORDER — LIDOCAINE HYDROCHLORIDE 10 MG/ML
5 INJECTION, SOLUTION INFILTRATION; PERINEURAL
Status: COMPLETED | OUTPATIENT
Start: 2024-04-25 | End: 2024-04-25

## 2024-04-25 RX ADMIN — LIDOCAINE HYDROCHLORIDE 5 ML: 10 INJECTION, SOLUTION INFILTRATION; PERINEURAL at 15:57

## 2024-04-25 RX ADMIN — TRIAMCINOLONE ACETONIDE 40 MG: 40 INJECTION, SUSPENSION INTRA-ARTICULAR; INTRAMUSCULAR at 15:57

## 2024-04-25 NOTE — PROGRESS NOTES
"Chief Complaint  Initial Evaluation of the Right Knee and Initial Evaluation of the Left Knee     Subjective      Katie Rudd presents to [facility] for initial evaluation of bilateral knees.  She notes it started out with the right knee on the medial aspect of the knee.  Now the left one is starting to pain her.  She take anti inflammatory that helps some with the pain.  She has had no recent injury or fall.  She has difficulty with prolonged standing, ambulation and stairs.     No Known Allergies     Social History     Socioeconomic History    Marital status:    Tobacco Use    Smoking status: Never    Smokeless tobacco: Never   Vaping Use    Vaping status: Never Used   Substance and Sexual Activity    Alcohol use: Not Currently    Drug use: Never    Sexual activity: Defer        I reviewed the patient's chief complaint, history of present illness, review of systems, past medical history, surgical history, family history, social history, medications, and allergy list.     Review of Systems     Constitutional: Denies fevers, chills, weight loss  Cardiovascular: Denies chest pain, shortness of breath  Skin: Denies rashes, acute skin changes  Neurologic: Denies headache, loss of consciousness        Vital Signs:   /75   Pulse 70   Ht 167.6 cm (66\")   Wt 89.8 kg (198 lb)   SpO2 96%   BMI 31.96 kg/m²          Physical Exam  General: Alert. No acute distress    Ortho Exam        BILATERAL KNEES Flexion 115. Extension 0. Stable to varus/valgus stress. Stable to anterior/posterior drawer. Neurovascularly intact. Negative Efren. Negative Lachman. Positive EHL, FHL, HS and TA. Sensation intact to light touch all 5 nerves of the foot. Ambulates with Antalgic gait. Patella is well tracking. Calf supple, non-tender. Positive tenderness to the medial joint line. Positive tenderness to the lateral joint line. Positive Crepitus. Good strength to hamstrings, quadriceps, " dorsiflexors, and plantar flexors.  Knee Extensor Mechanism intact       Large Joint: R knee  Date/Time: 4/25/2024 3:57 PM  Consent given by: patient  Site marked: site marked  Timeout: Immediately prior to procedure a time out was called to verify the correct patient, procedure, equipment, support staff and site/side marked as required   Supporting Documentation  Indications: pain   Procedure Details  Location: knee - R knee  Preparation: Patient was prepped and draped in the usual sterile fashion  Needle gauge: 21G.  Medications administered: 5 mL lidocaine 1 %; 40 mg triamcinolone acetonide 40 MG/ML  Patient tolerance: patient tolerated the procedure well with no immediate complications      This injection documentation was Scribed for Glen Platt MD by Pooja Orta MA.  04/25/24   15:57 EDT     Imaging Results (Most Recent)       Procedure Component Value Units Date/Time    XR Knee 3 View Right [[id number]] Resulted: 04/25/24 1513     Updated: 04/25/24 1514    XR Knee 3 View Left [[id number]] Resulted: 04/25/24 1513     Updated: 04/25/24 1514             Result Review :     X-Ray Report:  Right knee X-Ray  Indication: Evaluation of the right knee  AP/Lateral and [place] view(s)  Findings: Advanced degenerative bone on bone arthritis on the medial aspect of the knee.   Prior studies available for comparison: no     X-Ray Report:  Left knee X-Ray  Indication: Evaluation of the left knee  AP/Lateral and [place] view(s)  Findings: Advanced degenerative bone on bone arthritis on the medial aspect of the knee.   Prior studies available for comparison: no           Assessment and Plan     Diagnoses and all orders for this visit:    1. Right knee pain, unspecified chronicity (Primary)  -     XR Knee 3 View Right    2. Left knee pain, unspecified chronicity  -     XR Knee 3 View Left    3. Osteoarthritis of both knees, unspecified osteoarthritis type    Other orders  -     Large Joint: R knee        Discussed the  treatment plan with the patient. I reviewed the X-rays that were obtained today with the patient.     Discussed the treatment options with the patient, operative vs non-operative. The patient is a candidate for a total knee arthroplasty of either knee whenever she is ready.     Discussed the risks and benefits of conservative measures. The patient expressed understanding and wished to proceed with right knee steroid injection.  She tolerated the injection well.       Call or return if worsening symptoms.    Follow Up     4-6 weeks to discuss injection of the left knee      Patient was given instructions and counseling regarding her condition or for health maintenance advice. Please see specific information pulled into the AVS if appropriate.     Scribed for Glen Platt MD by Criss Agustin MA.  04/25/24   15:08 EDT    I have personally performed the services described in this document as scribed by the above individual and it is both accurate and complete. Glen Platt MD 04/25/24

## 2024-05-23 ENCOUNTER — OFFICE VISIT (OUTPATIENT)
Dept: ORTHOPEDIC SURGERY | Facility: CLINIC | Age: 74
End: 2024-05-23
Payer: MEDICARE

## 2024-05-23 ENCOUNTER — PREP FOR SURGERY (OUTPATIENT)
Dept: OTHER | Facility: HOSPITAL | Age: 74
End: 2024-05-23
Payer: MEDICARE

## 2024-05-23 VITALS
BODY MASS INDEX: 31.82 KG/M2 | WEIGHT: 198 LBS | DIASTOLIC BLOOD PRESSURE: 83 MMHG | SYSTOLIC BLOOD PRESSURE: 141 MMHG | HEIGHT: 66 IN | OXYGEN SATURATION: 92 % | HEART RATE: 64 BPM

## 2024-05-23 DIAGNOSIS — M17.11 PRIMARY OSTEOARTHRITIS OF RIGHT KNEE: Primary | ICD-10-CM

## 2024-05-23 DIAGNOSIS — M17.0 OSTEOARTHRITIS OF BOTH KNEES, UNSPECIFIED OSTEOARTHRITIS TYPE: Primary | ICD-10-CM

## 2024-05-23 RX ORDER — TRANEXAMIC ACID 10 MG/ML
1000 INJECTION, SOLUTION INTRAVENOUS ONCE
OUTPATIENT
Start: 2024-05-23 | End: 2024-05-23

## 2024-05-23 NOTE — PROGRESS NOTES
"Chief Complaint  Follow-up of the Left Knee and Follow-up and Pain of the Right Knee    Subjective      Katie Rudd presents to [facility] for bilateral knee pain and osteoarthritis.  She was recently evaluated in office on 4/25/2024 with x-rays showing advanced degenerative bone-on-bone arthritis in the medial aspect of the knee.  She received a right knee steroid injection.  She presents independently ambulatory without use of assistive device.  Reports that her knees are \"about the same\".  She reports minimal and temporary relief with the steroid injection.  States it is to the point where she is having difficulties completing ADLs, including grocery shopping.  She states she is ready to proceed with surgery.    Objective   No Known Allergies    Vital Signs:   /83   Pulse 64   Ht 167.6 cm (66\")   Wt 89.8 kg (198 lb)   SpO2 92%   BMI 31.96 kg/m²       Physical Exam    Constitutional: Awake, alert. Well nourished appearance.    Integumentary: Warm, dry, intact. No obvious rashes.    HENT: Atraumatic, normocephalic.   Respiratory: Non labored respirations .   Cardiovascular: Intact peripheral pulses.    Psychiatric: Normal mood and affect. A&O X3    Ortho Exam  Right knee: Skin is warm, dry, and intact.  Mild edema bilaterally.  Tenderness to palpation of joint lines.  Valgus alignment.  Crepitus with ROM.  Palpable osteophytes.  Full to -2 degrees of extension and flexion 115-120 degrees.  Full plantarflexion and dorsiflexion the ankle.  Sensation and distal neurovascular intact.  Smooth sit to stand transition.  Patient fully weightbearing with antalgic gait.    Imaging Results (Most Recent)       None                      Assessment and Plan   Problem List Items Addressed This Visit    None  Visit Diagnoses       Osteoarthritis of both knees, unspecified osteoarthritis type    -  Primary            Follow Up   No follow-ups on file.    Tobacco Use: Low Risk  " (5/23/2024)    Patient History     Smoking Tobacco Use: Never     Smokeless Tobacco Use: Never     Passive Exposure: Not on file       Educated on risk of smoking. Discussed options for smoking cessation.    Patient Instructions   Recent x-rays reviewed.  Right knee x-rays showed grade 4 osteoarthritis with bone-on-bone findings in the medial compartment and advanced osteophyte formation present.  She did receive steroid injection with minimal relief.  She is complaining of significant knee instability and is requesting to proceed with total knee arthroplasty.  Did discuss with Dr. Paltt, who is in agreement.  Orders placed.    Discussed surgery. Risks/benefits discussed with patient including, but not limited to: infection, bleeding, neurovascular damage, malunion, nonunion, aesthetic deformity, need for further surgery, and death. Discussed with patient the implant type being used during surgery and patient understands and desires to proceed. Surgery pamphlet provided to patient. Call or return if worsening symptoms.    Patient was given instructions and counseling regarding her condition or for health maintenance advice. Please see specific information pulled into the AVS if appropriate.

## 2024-05-23 NOTE — PATIENT INSTRUCTIONS
Recent x-rays reviewed.  Right knee x-rays showed grade 4 osteoarthritis with bone-on-bone findings in the medial compartment and advanced osteophyte formation present.  She did receive steroid injection with minimal relief.  She is complaining of significant knee instability and is requesting to proceed with total knee arthroplasty.  Did discuss with Dr. Platt, who is in agreement.  Orders placed.    Discussed surgery. Risks/benefits discussed with patient including, but not limited to: infection, bleeding, neurovascular damage, malunion, nonunion, aesthetic deformity, need for further surgery, and death. Discussed with patient the implant type being used during surgery and patient understands and desires to proceed. Surgery pamphlet provided to patient. Call or return if worsening symptoms.

## 2024-06-13 DIAGNOSIS — Z47.1 AFTERCARE FOLLOWING RIGHT KNEE JOINT REPLACEMENT SURGERY: Primary | ICD-10-CM

## 2024-06-13 DIAGNOSIS — Z96.651 AFTERCARE FOLLOWING RIGHT KNEE JOINT REPLACEMENT SURGERY: Primary | ICD-10-CM

## 2024-07-11 DIAGNOSIS — Z01.818 ENCOUNTER FOR PREADMISSION TESTING: Primary | ICD-10-CM

## 2024-07-15 ENCOUNTER — PRE-ADMISSION TESTING (OUTPATIENT)
Dept: PREADMISSION TESTING | Facility: HOSPITAL | Age: 74
End: 2024-07-15
Payer: MEDICARE

## 2024-07-15 VITALS
WEIGHT: 193.34 LBS | BODY MASS INDEX: 32.21 KG/M2 | DIASTOLIC BLOOD PRESSURE: 86 MMHG | RESPIRATION RATE: 20 BRPM | TEMPERATURE: 97.3 F | SYSTOLIC BLOOD PRESSURE: 120 MMHG | HEIGHT: 65 IN | OXYGEN SATURATION: 95 %

## 2024-07-15 DIAGNOSIS — Z01.818 ENCOUNTER FOR PREADMISSION TESTING: ICD-10-CM

## 2024-07-15 DIAGNOSIS — M17.11 PRIMARY OSTEOARTHRITIS OF RIGHT KNEE: ICD-10-CM

## 2024-07-15 LAB
ALBUMIN SERPL-MCNC: 4 G/DL (ref 3.5–5.2)
ALBUMIN/GLOB SERPL: 1.7 G/DL
ALP SERPL-CCNC: 96 U/L (ref 39–117)
ALT SERPL W P-5'-P-CCNC: 27 U/L (ref 1–33)
ANION GAP SERPL CALCULATED.3IONS-SCNC: 7.4 MMOL/L (ref 5–15)
AST SERPL-CCNC: 22 U/L (ref 1–32)
BASOPHILS # BLD AUTO: 0.02 10*3/MM3 (ref 0–0.2)
BASOPHILS NFR BLD AUTO: 0.3 % (ref 0–1.5)
BILIRUB SERPL-MCNC: 0.7 MG/DL (ref 0–1.2)
BUN SERPL-MCNC: 19 MG/DL (ref 8–23)
BUN/CREAT SERPL: 23.5 (ref 7–25)
CALCIUM SPEC-SCNC: 9.1 MG/DL (ref 8.6–10.5)
CHLORIDE SERPL-SCNC: 104 MMOL/L (ref 98–107)
CO2 SERPL-SCNC: 28.6 MMOL/L (ref 22–29)
CREAT SERPL-MCNC: 0.81 MG/DL (ref 0.57–1)
DEPRECATED RDW RBC AUTO: 47.9 FL (ref 37–54)
EGFRCR SERPLBLD CKD-EPI 2021: 76.3 ML/MIN/1.73
EOSINOPHIL # BLD AUTO: 0.04 10*3/MM3 (ref 0–0.4)
EOSINOPHIL NFR BLD AUTO: 0.5 % (ref 0.3–6.2)
ERYTHROCYTE [DISTWIDTH] IN BLOOD BY AUTOMATED COUNT: 14.6 % (ref 12.3–15.4)
GLOBULIN UR ELPH-MCNC: 2.3 GM/DL
GLUCOSE SERPL-MCNC: 104 MG/DL (ref 65–99)
HBA1C MFR BLD: 5.5 % (ref 4.8–5.6)
HCT VFR BLD AUTO: 45.1 % (ref 34–46.6)
HGB BLD-MCNC: 15 G/DL (ref 12–15.9)
IMM GRANULOCYTES # BLD AUTO: 0.02 10*3/MM3 (ref 0–0.05)
IMM GRANULOCYTES NFR BLD AUTO: 0.3 % (ref 0–0.5)
LYMPHOCYTES # BLD AUTO: 1.44 10*3/MM3 (ref 0.7–3.1)
LYMPHOCYTES NFR BLD AUTO: 19.1 % (ref 19.6–45.3)
MCH RBC QN AUTO: 30.2 PG (ref 26.6–33)
MCHC RBC AUTO-ENTMCNC: 33.3 G/DL (ref 31.5–35.7)
MCV RBC AUTO: 90.9 FL (ref 79–97)
MONOCYTES # BLD AUTO: 0.56 10*3/MM3 (ref 0.1–0.9)
MONOCYTES NFR BLD AUTO: 7.4 % (ref 5–12)
NEUTROPHILS NFR BLD AUTO: 5.45 10*3/MM3 (ref 1.7–7)
NEUTROPHILS NFR BLD AUTO: 72.4 % (ref 42.7–76)
NRBC BLD AUTO-RTO: 0 /100 WBC (ref 0–0.2)
PLATELET # BLD AUTO: 189 10*3/MM3 (ref 140–450)
PMV BLD AUTO: 9.7 FL (ref 6–12)
POTASSIUM SERPL-SCNC: 4.8 MMOL/L (ref 3.5–5.2)
PROT SERPL-MCNC: 6.3 G/DL (ref 6–8.5)
QT INTERVAL: 410 MS
QTC INTERVAL: 407 MS
RBC # BLD AUTO: 4.96 10*6/MM3 (ref 3.77–5.28)
SODIUM SERPL-SCNC: 140 MMOL/L (ref 136–145)
WBC NRBC COR # BLD AUTO: 7.53 10*3/MM3 (ref 3.4–10.8)

## 2024-07-15 PROCEDURE — 36415 COLL VENOUS BLD VENIPUNCTURE: CPT

## 2024-07-15 PROCEDURE — 93005 ELECTROCARDIOGRAM TRACING: CPT

## 2024-07-15 PROCEDURE — 80053 COMPREHEN METABOLIC PANEL: CPT

## 2024-07-15 PROCEDURE — 83036 HEMOGLOBIN GLYCOSYLATED A1C: CPT

## 2024-07-15 PROCEDURE — 85025 COMPLETE CBC W/AUTO DIFF WBC: CPT

## 2024-07-15 RX ORDER — VIBEGRON 75 MG/1
1 TABLET, FILM COATED ORAL DAILY
COMMUNITY
Start: 2024-06-14

## 2024-07-15 NOTE — DISCHARGE INSTRUCTIONS
IMPORTANT INSTRUCTIONS - PRE-ADMISSION TESTING  DO NOT EAT OR CHEW anything after midnight the night before your procedure.    You may have CLEAR liquids up to ____3__ hours prior to ARRIVAL time.   Take the following medications the morning of your procedure with JUST A SIP OF WATER:  ___Amlodipine, zoloft, Gemtesa, ____________________________________________________________________________________________________________________________________________________________________________________    DO NOT BRING your medications to the hospital with you, UNLESS something has changed since your PRE-Admission Testing appointment.  Hold all vitamins, supplements, and NSAIDS (Non- steroidal anti-inflammatory meds) for one week prior to surgery (you MAY take Tylenol or Acetamin     Make sure you have a ride home and have someone who will stay with you the day of your procedure after you go home.  If you have any questions, please call your Pre-Admission Testing Nurse, _Guillermina CHARLES RN  at [phone].   Per anesthesia request, do not smoke for 24 hours before your procedure or as instructed by your surgeon.    Clear Liquid Diet        Find out when you need to start a clear liquid diet.   Think of “clear liquids” as anything you could read a newspaper through. This includes things like water, broth, sports drinks, or tea WITHOUT any kind of milk or cream.           Once you are told to start a clear liquid diet, only drink these things until 2 hours before arrival to the hospital or when the hospital says to stop. Total volume limitation: 8 oz.       Clear liquids you CAN drink:   Water   Clear broth: beef, chicken, vegetable, or bone broth with nothing in it   Gatorade   Lemonade or Waldo-aid   Soda   Tea, coffee (NO cream or honey)   Jell-O (without fruit)   Popsicles (without fruit or cream)   Italian ices   Juice without pulp: apple, white, grape   You may use salt, pepper, and sugar  No red liquids     Do NOT drink:    Milk or cream   Soy milk, almond milk, coconut milk, or other non-dairy drinks and   creamers   Milkshakes or smoothies   Tomato juice   Orange juice   Grapefruit juice   Cream soups or any other than broth         Clear Liquid Diet:  Do NOT eat any solid food.  Do NOT eat or suck on mints or candy.  Do NOT chew gum.  Do NOT drink thick liquids like milk or juice with pulp in it.  Do NOT add milk, cream, or anything like soy milk or almond milk to coffee or tea.     PREOPERATIVE (BEFORE SURGERY)              BATHING INSTRUCTIONS  Instructions:    You will need to shower 3 separate times utilizing the soap provided; at the times indicated   below:     7/17/24PM           7/18/24 AM    7/18/24PM        Wash your hair and face with normal shampoo and soap, rinse it well before using the surgical soap.      In the shower, wet the skin completely with water from your neck to your feet. Apply the cleanser to your   body ONLY FROM THE NECK TO YOUR FEET.     Do NOT USE THE CLEANSER ON YOUR FACE, HEAD, OR GENITAL (PRIVATE) AREAS.   Keep it out of your eyes, ears, and mouth because of the risk of injury to those areas.      Scrub with a clean washcloth for each bath utilizing the soap provided from the top of your body to the   bottom starting at the neck area.      Pay close attention to your armpits, groin area, and the site of surgery.      Wash your body gently for 5 minutes. Stand outside the stream or turn off the water while scrubbing your   body. Do NOT wash with your regular soap after the surgical cleanser is used.      RINSE THE CLEANSER OFF COMPLETELY with plenty of water. Rinse the area again thoroughly.      Dry off with a clean towel. The surgical soap can cause dryness; however do NOT APPLY LOTION,   CREAM, POWDER, and/or DEODORANT AFTER SHOWERING.     Be sure to where clean clothes after showering.      Ensure CLEAN BED LINENS AFTER FIRST wash with the surgical soap.      NO PETS ALLOWED IN THE BED with you  after utilizing the surgical soap.

## 2024-07-15 NOTE — NURSING NOTE
Informed Federica at Dr. Platt's office that pt had fallen and has a nickel size scab on her right elbow. No redness or drainage noted. Surgery this Friday.  States will inform Dr. Platt.

## 2024-07-15 NOTE — SIGNIFICANT NOTE
Pt's goal is to have less pain while ambulating,  Pt's spouse to be her support with aftercare. Pt using [facility] in [place] for her physical therapy.

## 2024-07-17 NOTE — H&P
"Chief Complaint  No chief complaint on file.    Subjective      Katie Rudd presents to [facility] for bilateral knee pain and osteoarthritis.  She was recently evaluated in office on 4/25/2024 with x-rays showing advanced degenerative bone-on-bone arthritis in the medial aspect of the knee.  She received a right knee steroid injection.  She presents independently ambulatory without use of assistive device.  Reports that her knees are \"about the same\".  She reports minimal and temporary relief with the steroid injection.  States it is to the point where she is having difficulties completing ADLs, including grocery shopping.  She states she is ready to proceed with surgery.    Objective   No Known Allergies    Vital Signs:   There were no vitals taken for this visit.      Physical Exam    Constitutional: Awake, alert. Well nourished appearance.    Integumentary: Warm, dry, intact. No obvious rashes.    HENT: Atraumatic, normocephalic.   Respiratory: Non labored respirations .   Cardiovascular: Intact peripheral pulses.    Psychiatric: Normal mood and affect. A&O X3    Ortho Exam  Right knee: Skin is warm, dry, and intact.  Mild edema bilaterally.  Tenderness to palpation of joint lines.  Valgus alignment.  Crepitus with ROM.  Palpable osteophytes.  Full to -2 degrees of extension and flexion 115-120 degrees.  Full plantarflexion and dorsiflexion the ankle.  Sensation and distal neurovascular intact.  Smooth sit to stand transition.  Patient fully weightbearing with antalgic gait.    Imaging Results (Most Recent)       None                      Assessment and Plan   Problem List Items Addressed This Visit    None        Follow Up   No follow-ups on file.    Tobacco Use: Low Risk  (7/15/2024)    Patient History     Smoking Tobacco Use: Never     Smokeless Tobacco Use: Never     Passive Exposure: Not on file       Educated on risk of smoking. Discussed options for smoking cessation.  Patient " Instructions   Recent x-rays reviewed.  Right knee x-rays showed grade 4 osteoarthritis with bone-on-bone findings in the medial compartment and advanced osteophyte formation present.  She did receive steroid injection with minimal relief.  She is complaining of significant knee instability and is requesting to proceed with total knee arthroplasty.  Did discuss with Dr. Platt, who is in agreement.  Orders placed.    Discussed surgery. Risks/benefits discussed with patient including, but not limited to: infection, bleeding, neurovascular damage, malunion, nonunion, aesthetic deformity, need for further surgery, and death. Discussed with patient the implant type being used during surgery and patient understands and desires to proceed. Surgery pamphlet provided to patient. Call or return if worsening symptoms.    Electronically signed by Glen Platt MD, 07/17/24, 3:05 PM EDT.

## 2024-07-30 DIAGNOSIS — Z96.651 AFTERCARE FOLLOWING RIGHT KNEE JOINT REPLACEMENT SURGERY: Primary | ICD-10-CM

## 2024-07-30 DIAGNOSIS — Z47.1 AFTERCARE FOLLOWING RIGHT KNEE JOINT REPLACEMENT SURGERY: Primary | ICD-10-CM

## 2024-08-09 ENCOUNTER — OFFICE VISIT (OUTPATIENT)
Dept: ORTHOPEDIC SURGERY | Facility: CLINIC | Age: 74
End: 2024-08-09
Payer: MEDICARE

## 2024-08-09 VITALS
OXYGEN SATURATION: 95 % | HEIGHT: 65 IN | DIASTOLIC BLOOD PRESSURE: 82 MMHG | BODY MASS INDEX: 32.24 KG/M2 | WEIGHT: 193.5 LBS | SYSTOLIC BLOOD PRESSURE: 139 MMHG | HEART RATE: 67 BPM

## 2024-08-09 DIAGNOSIS — M25.552 LEFT HIP PAIN: Primary | ICD-10-CM

## 2024-08-09 DIAGNOSIS — M16.12 OSTEOARTHRITIS OF LEFT HIP, UNSPECIFIED OSTEOARTHRITIS TYPE: ICD-10-CM

## 2024-08-09 DIAGNOSIS — M54.32 SCIATICA OF LEFT SIDE: ICD-10-CM

## 2024-08-09 RX ORDER — DEXAMETHASONE SODIUM PHOSPHATE 4 MG/ML
8 INJECTION, SOLUTION INTRA-ARTICULAR; INTRALESIONAL; INTRAMUSCULAR; INTRAVENOUS; SOFT TISSUE ONCE
Status: COMPLETED | OUTPATIENT
Start: 2024-08-09 | End: 2024-08-09

## 2024-08-09 RX ADMIN — DEXAMETHASONE SODIUM PHOSPHATE 8 MG: 4 INJECTION, SOLUTION INTRA-ARTICULAR; INTRALESIONAL; INTRAMUSCULAR; INTRAVENOUS; SOFT TISSUE at 10:17

## 2024-08-09 NOTE — PROGRESS NOTES
"Chief Complaint  Initial Evaluation of the Left Hip     Subjective      Katie Rudd presents to [facility] for initial evaluation of the left hip.  She is having pain in the low back that goes all the way down to the toe.  She has had this for a few months.  She has had no recent injury or fall.     No Known Allergies     Social History     Socioeconomic History    Marital status:    Tobacco Use    Smoking status: Never    Smokeless tobacco: Never   Vaping Use    Vaping status: Never Used   Substance and Sexual Activity    Alcohol use: Not Currently    Drug use: Never    Sexual activity: Defer        I reviewed the patient's chief complaint, history of present illness, review of systems, past medical history, surgical history, family history, social history, medications, and allergy list.     Review of Systems     Constitutional: Denies fevers, chills, weight loss  Cardiovascular: Denies chest pain, shortness of breath  Skin: Denies rashes, acute skin changes  Neurologic: Denies headache, loss of consciousness  MSK: Left hip pain.       Vital Signs:   /82   Pulse 67   Ht 165.1 cm (65\")   Wt 87.8 kg (193 lb 8 oz)   SpO2 95%   BMI 32.20 kg/m²          Physical Exam  General: Alert. No acute distress    Ortho Exam        LEFT HIP Hip No skin discoloration, atrophy or swelling. Positive EHL, FHL, GS, and TA. Sensation intact to all 5 nerves of the foot. Pain with  straight leg raise. Leg lengths appear equal. Ambulates with Antalgic gait Negative Kevin. Negative Glenda. Good strength to hamstrings, quadriceps, dorsiflexors, and plantar flexors. Knee Extensor Mechanism  intact        Procedures      Imaging Results (Most Recent)       None             Result Review :     X-Ray Report:  Left hip X-Ray  Indication: Evaluation of the left hip   AP/Lateral view(s)  Findings: Advanced degenerative changes of the left hip. No acute fracture or dislocation.   Prior studies " available for comparison: no             Assessment and Plan     Diagnoses and all orders for this visit:    1. Left hip pain (Primary)  -     XR Hip With or Without Pelvis 2 - 3 View Left; Future    2. Osteoarthritis of left hip, unspecified osteoarthritis type    3. Sciatica of left side        Discussed the treatment plan with the patient. I reviewed the X-rays that were obtained today with the patient.     Discussed the risks and benefits of conservative measures. The patient expressed understanding and wished to proceed with a left hip IM injection.  She tolerated the injection well.       Call or return if worsening symptoms.    Follow Up     PRN      Patient was given instructions and counseling regarding her condition or for health maintenance advice. Please see specific information pulled into the AVS if appropriate.     Scribed for Glen Platt MD by Criss Agustin MA.  08/09/24   09:53 EDT    I have personally performed the services described in this document as scribed by the above individual and it is both accurate and complete. Geln Platt MD 08/09/24

## 2024-08-19 ENCOUNTER — PRE-ADMISSION TESTING (OUTPATIENT)
Dept: PREADMISSION TESTING | Facility: HOSPITAL | Age: 74
End: 2024-08-19
Payer: MEDICARE

## 2024-08-19 VITALS
BODY MASS INDEX: 32.29 KG/M2 | OXYGEN SATURATION: 96 % | TEMPERATURE: 97.2 F | SYSTOLIC BLOOD PRESSURE: 126 MMHG | HEART RATE: 64 BPM | WEIGHT: 193.78 LBS | HEIGHT: 65 IN | RESPIRATION RATE: 16 BRPM | DIASTOLIC BLOOD PRESSURE: 62 MMHG

## 2024-08-19 DIAGNOSIS — Z01.818 PREOP TESTING: Primary | ICD-10-CM

## 2024-08-19 LAB
ALBUMIN SERPL-MCNC: 3.8 G/DL (ref 3.5–5.2)
ALBUMIN/GLOB SERPL: 1.4 G/DL
ALP SERPL-CCNC: 90 U/L (ref 39–117)
ALT SERPL W P-5'-P-CCNC: 18 U/L (ref 1–33)
ANION GAP SERPL CALCULATED.3IONS-SCNC: 7.4 MMOL/L (ref 5–15)
AST SERPL-CCNC: 16 U/L (ref 1–32)
BASOPHILS # BLD AUTO: 0.02 10*3/MM3 (ref 0–0.2)
BASOPHILS NFR BLD AUTO: 0.2 % (ref 0–1.5)
BILIRUB SERPL-MCNC: 0.6 MG/DL (ref 0–1.2)
BUN SERPL-MCNC: 17 MG/DL (ref 8–23)
BUN/CREAT SERPL: 20 (ref 7–25)
CALCIUM SPEC-SCNC: 9.3 MG/DL (ref 8.6–10.5)
CHLORIDE SERPL-SCNC: 106 MMOL/L (ref 98–107)
CO2 SERPL-SCNC: 28.6 MMOL/L (ref 22–29)
CREAT SERPL-MCNC: 0.85 MG/DL (ref 0.57–1)
DEPRECATED RDW RBC AUTO: 50.2 FL (ref 37–54)
EGFRCR SERPLBLD CKD-EPI 2021: 72 ML/MIN/1.73
EOSINOPHIL # BLD AUTO: 0.1 10*3/MM3 (ref 0–0.4)
EOSINOPHIL NFR BLD AUTO: 1.2 % (ref 0.3–6.2)
ERYTHROCYTE [DISTWIDTH] IN BLOOD BY AUTOMATED COUNT: 14.6 % (ref 12.3–15.4)
GLOBULIN UR ELPH-MCNC: 2.8 GM/DL
GLUCOSE SERPL-MCNC: 103 MG/DL (ref 65–99)
HBA1C MFR BLD: 5.6 % (ref 4.8–5.6)
HCT VFR BLD AUTO: 46.4 % (ref 34–46.6)
HGB BLD-MCNC: 15.1 G/DL (ref 12–15.9)
IMM GRANULOCYTES # BLD AUTO: 0.05 10*3/MM3 (ref 0–0.05)
IMM GRANULOCYTES NFR BLD AUTO: 0.6 % (ref 0–0.5)
LYMPHOCYTES # BLD AUTO: 1.72 10*3/MM3 (ref 0.7–3.1)
LYMPHOCYTES NFR BLD AUTO: 21.2 % (ref 19.6–45.3)
MCH RBC QN AUTO: 30.4 PG (ref 26.6–33)
MCHC RBC AUTO-ENTMCNC: 32.5 G/DL (ref 31.5–35.7)
MCV RBC AUTO: 93.5 FL (ref 79–97)
MONOCYTES # BLD AUTO: 0.65 10*3/MM3 (ref 0.1–0.9)
MONOCYTES NFR BLD AUTO: 8 % (ref 5–12)
NEUTROPHILS NFR BLD AUTO: 5.59 10*3/MM3 (ref 1.7–7)
NEUTROPHILS NFR BLD AUTO: 68.8 % (ref 42.7–76)
NRBC BLD AUTO-RTO: 0 /100 WBC (ref 0–0.2)
PLATELET # BLD AUTO: 196 10*3/MM3 (ref 140–450)
PMV BLD AUTO: 9.4 FL (ref 6–12)
POTASSIUM SERPL-SCNC: 6.1 MMOL/L (ref 3.5–5.2)
PROT SERPL-MCNC: 6.6 G/DL (ref 6–8.5)
RBC # BLD AUTO: 4.96 10*6/MM3 (ref 3.77–5.28)
SODIUM SERPL-SCNC: 142 MMOL/L (ref 136–145)
WBC NRBC COR # BLD AUTO: 8.13 10*3/MM3 (ref 3.4–10.8)

## 2024-08-19 PROCEDURE — 80053 COMPREHEN METABOLIC PANEL: CPT

## 2024-08-19 PROCEDURE — 36415 COLL VENOUS BLD VENIPUNCTURE: CPT

## 2024-08-19 PROCEDURE — 83036 HEMOGLOBIN GLYCOSYLATED A1C: CPT | Performed by: ANESTHESIOLOGY

## 2024-08-19 PROCEDURE — 85025 COMPLETE CBC W/AUTO DIFF WBC: CPT

## 2024-08-19 NOTE — NURSING NOTE
LAB NOTIFIED MAX ZIMMERMAN OF [facility] ELEVATED POTASSIUM LEVEL OF 6.1. LABS REVIEWED KIDNEY FUNCTIONS WITHIN NORMAL LIMITS, JUST HAS AN ELEVATED POTASSIUM LEVEL. PATIENT ISN'T ON ANY POTASSIUM SUPPLEMENTS. DR LA NOTIFIED OF THE RESULTS. HE STATED SINCE KIDNEY FUNCTION WITH IN NORMAL LIMITS AND JUST POTASSIUM ELEVATED, HAVE PATIENT REPEAT POTASSIUM ON WED OR THUR 8-21-24 OR 8-22-24 TO SEE IF LABS IMPROVED, IF NOT PAT RN TO CONTACT DR LA FOR FURTHER TREATMENT. BELLA AT DR HERNANDEZ OFFICE NOTIFIED VIA MESSENGER, NO RESPONSE. NOTIFIED THE PATIENT OF ELEVATED LAB RESULTS AND TO REPEAT POTASSIUM LEVEL MID WEEK. ALSO INFORMED HER TO DECREASE ANY POTASSIUM RICH FOODS IN HER DIET. PT VOICED UNDERSTANDING.

## 2024-08-19 NOTE — SIGNIFICANT NOTE
PT WOULD LIKE TO DO THERAPY AT [facility], [place] IN [place], [place].  PT  TO TRANSPORT PT TO AND FROM THERAPY.     GOAL- TO BE ABLE TO WALK AROUND AND DO WHAT SHE WANTS    JOINT CLASS 7-15-24    DME ASSISTANCE NEEDED- WALKER, CANE, ELEVATED TOILET SEAT

## 2024-08-19 NOTE — DISCHARGE INSTRUCTIONS
IMPORTANT INSTRUCTIONS - PRE-ADMISSION TESTING  DO NOT EAT OR CHEW anything after midnight the night before your procedure.    You may have CLEAR liquids up to _3_____ hours prior to ARRIVAL time.   Take the following medications the morning of your procedure with JUST A SIP OF WATER:        ZOLOFT, NORVASC, LIPITOR, GEMTESA     DO NOT BRING your medications to the hospital with you, UNLESS something has changed since your PRE-Admission Testing appointment.  Hold all vitamins, supplements, and NSAIDS (Non- steroidal anti-inflammatory meds- Motrin, Ibuprofen, Aleve ) for one week prior to surgery                             STOP:  8-19-24 NO VITAMINS, NO MOTRIN                *You MAY take Tylenol or Acetaminophen    If you are diabetic, check your blood sugar the morning of your procedure. If it is less than 70 or if you are feeling symptomatic, call the following number for further instructions: [phone] or [phone]  Use your inhalers/nebulizers as usual, the morning of your procedure. BRING YOUR INHALERS with you.   Bring your CPAP or BIPAP to hospital, ONLY IF YOU WILL BE SPENDING THE NIGHT.   Make sure you have a ride home and have someone who will stay with you the day of your procedure after you go home.  If you have any questions, please call your Pre-Admission Testing Nurse, __Luz JONES at [phone] or [facility] is [phone].   Per anesthesia request, do not smoke for 24 hours before your procedure or as instructed by your surgeon.    Follow any instructions given to you from your surgeon's office      SURGERY _[id number]__[id number] A, THIRD FLOOR, CHECK IN AT DESK FOR REGISTRATION/ SURGERY  - YOU WILL RECEIVE A CALL THE DAY PRIOR TO SURGERY WITH ARRIVAL TIME BETWEEN 1 PM- 4PM, IF YOUR SURGERY IS ON MONDAY YOU WILL RECEIVE THE ARRIVAL TIME CALL THE FRIDAY      PRIOR TO PROCEDURE.      IF PREADMISSION IS CLOSED, CALL [facility] AREA -[phone], IF NO ANSWER, LEAVE A MESSAGE AND SOMEONE WILL  RETURN YOUR CALL WHEN THEY RETURN    - FOLLOW ANY INSTRUCTIONS FROM SURGEON'S OFFICE    - BRING CASH OR CREDIT CARD FOR COPAYMENT OF MEDICATIONS AFTER SURGERY IF YOU USE THE HOSPITAL PHARMACY (MEDS TO BED)          -DO NOT TAKE __N/A____7 DAYS PRIOR TO PROCEDURE PER ANESTHESIA RECOMMENDATIONS/ INSTRUCTIONS         - USE SURGICAL SOAP 3 TIMES AS INSTRUCTED BELOW         - DRINK 20 OZ GATORADE 3 HOURS PRIOR TO SURGERY ( NO RED)         - DAY OF SURGERY- BRING 1 CHANGE OF COMFORTABLE CLOTHES, TENNIS SHOES, JOINT BOOKLET, JAUREGUI/CARD FOR COPAYMENT OF MEDICATIONS       PREOPERATIVE (BEFORE SURGERY)              BATHING INSTRUCTIONS  Instructions:    You will need to shower 3 separate times utilizing the soap provided; at the times indicated   below:     8-24-24 SAT PM  8-25-24 SUN AM  8-25-24 SUN PM     Wash your hair and face with normal shampoo and soap, rinse it well before using the surgical soap.      In the shower, wet the skin completely with water from your neck to your feet. Apply the cleanser to your   body ONLY FROM THE NECK TO YOUR FEET.     Do NOT USE THE CLEANSER ON YOUR FACE, HEAD, OR GENITAL (PRIVATE) AREAS.   Keep it out of your eyes, ears, and mouth because of the risk of injury to those areas.      Scrub with a clean washcloth for each bath utilizing the soap provided from the top of your body to the   bottom starting at the neck area.      Pay close attention to your armpits, groin area, and the site of surgery.      Wash your body gently for 5 minutes. Stand outside the stream or turn off the water while scrubbing your   body. Do NOT wash with your regular soap after the surgical cleanser is used.      RINSE THE CLEANSER OFF COMPLETELY with plenty of water. Rinse the area again thoroughly.      Dry off with a clean towel. The surgical soap can cause dryness; however do NOT APPLY LOTION,   CREAM, POWDER, and/or DEODORANT AFTER SHOWERING.     Be sure to where clean clothes after showering.      Ensure  CLEAN BED LINENS AFTER FIRST wash with the surgical soap. - SAT PM     NO PETS ALLOWED IN THE BED with you after utilizing the surgical soap.    Clear Liquid Diet        Find out when you need to start a clear liquid diet.   Think of “clear liquids” as anything you could read a newspaper through. This includes things like water, broth, sports drinks, or tea WITHOUT any kind of milk or cream.           Once you are told to start a clear liquid diet, only drink these things until 2 hours before arrival to the hospital or when the hospital says to stop. Total volume limitation: 8 oz.       Clear liquids you CAN drink:   Water   Clear broth: beef, chicken, vegetable, or bone broth with nothing in it   Gatorade   Lemonade or Waldo-aid   Soda   Tea, coffee (NO cream or honey)   Jell-O (without fruit)   Popsicles (without fruit or cream)   Italian ices   Juice without pulp: apple, white, grape   You may use salt, pepper, and sugar  NO RED  NO NOODLES    Do NOT drink:   Milk or cream   Soy milk, almond milk, coconut milk, or other non-dairy drinks and   creamers   Milkshakes or smoothies   Tomato juice   Orange juice   Grapefruit juice   Cream soups or any other than broth         Clear Liquid Diet:  Do NOT eat any solid food.  Do NOT eat or suck on mints or candy.  Do NOT chew gum.  Do NOT drink thick liquids like milk or juice with pulp in it.  Do NOT add milk, cream, or anything like soy milk or almond milk to coffee or tea.

## 2024-08-20 ENCOUNTER — TELEPHONE (OUTPATIENT)
Dept: ORTHOPEDIC SURGERY | Facility: CLINIC | Age: 74
End: 2024-08-20
Payer: MEDICARE

## 2024-08-20 NOTE — TELEPHONE ENCOUNTER
FELICIA RENEE RN AFROM PAT SNET MESSAGE THAT PATIENT HAS AN ELEVATED POTASSIUM.  ORDER TO HAVE REDRAW WAS PLACED IN CHART.  PATIENT MADE AWARE TO HAVE LABS DRAWN AND IF LEVEL IS NORMAL SURGERY WILL PROCEED.  SOILA HERNANDEZ MADE AWARE.  ELSIE MEJIAS WILL BE REVIEWING THE LABS RESULTS.

## 2024-08-22 ENCOUNTER — LAB (OUTPATIENT)
Dept: LAB | Facility: HOSPITAL | Age: 74
End: 2024-08-22
Payer: MEDICARE

## 2024-08-22 ENCOUNTER — TELEPHONE (OUTPATIENT)
Dept: ORTHOPEDIC SURGERY | Facility: CLINIC | Age: 74
End: 2024-08-22
Payer: MEDICARE

## 2024-08-22 DIAGNOSIS — Z01.818 ENCOUNTER FOR PREADMISSION TESTING: ICD-10-CM

## 2024-08-22 DIAGNOSIS — Z01.818 ENCOUNTER FOR PREADMISSION TESTING: Primary | ICD-10-CM

## 2024-08-22 LAB
ALBUMIN SERPL-MCNC: 4.1 G/DL (ref 3.5–5.2)
ALBUMIN/GLOB SERPL: 1.8 G/DL
ALP SERPL-CCNC: 88 U/L (ref 39–117)
ALT SERPL W P-5'-P-CCNC: 24 U/L (ref 1–33)
ANION GAP SERPL CALCULATED.3IONS-SCNC: 8.8 MMOL/L (ref 5–15)
AST SERPL-CCNC: 22 U/L (ref 1–32)
BILIRUB SERPL-MCNC: 0.6 MG/DL (ref 0–1.2)
BUN SERPL-MCNC: 18 MG/DL (ref 8–23)
BUN/CREAT SERPL: 18.6 (ref 7–25)
CALCIUM SPEC-SCNC: 9.5 MG/DL (ref 8.6–10.5)
CHLORIDE SERPL-SCNC: 104 MMOL/L (ref 98–107)
CO2 SERPL-SCNC: 27.2 MMOL/L (ref 22–29)
CREAT SERPL-MCNC: 0.97 MG/DL (ref 0.57–1)
EGFRCR SERPLBLD CKD-EPI 2021: 61.4 ML/MIN/1.73
GLOBULIN UR ELPH-MCNC: 2.3 GM/DL
GLUCOSE SERPL-MCNC: 118 MG/DL (ref 65–99)
POTASSIUM SERPL-SCNC: 5 MMOL/L (ref 3.5–5.2)
PROT SERPL-MCNC: 6.4 G/DL (ref 6–8.5)
SODIUM SERPL-SCNC: 140 MMOL/L (ref 136–145)

## 2024-08-22 PROCEDURE — 36415 COLL VENOUS BLD VENIPUNCTURE: CPT

## 2024-08-22 PROCEDURE — 80053 COMPREHEN METABOLIC PANEL: CPT

## 2024-08-22 NOTE — TELEPHONE ENCOUNTER
Provider: DAVID    Caller: CRISTI    Relationship to Patient: SELF    Pharmacy:     Phone Number: [phone]    Reason for Call: PT CALLING BECAUSE SHE WENT TO GET HER LABS DONE TODAY BUT THEY DID NOT SEE AN ORDER FOR THIS - PLEASE ADVISE ON WHAT PT SHOULD DO

## 2024-08-22 NOTE — TELEPHONE ENCOUNTER
SPOKE WITH PATIENT, LET HER KNOW THAT I SEE THE ORDER FOR POTASSIUM TO BE RE-CHECKED. HOWEVER I WILL CALL PAT AND SEE IF THEY CAN RE-ORDER SINCE THE LAB CANNOT SEE IT. PAT IS RECHECKING POTASSIUM LEVEL PRIOR TO ANESTHESIA GIVING HER THE GO-AHEAD FOR SURGERY. WILL CALL PATIENT BACK ONCE I SPEAK TO ELSIE MEJIAS WITH [facility].

## 2024-08-22 NOTE — TELEPHONE ENCOUNTER
LVM FOR PATIENT TO LET HER KNOW LAB HAS BEEN RE-ORDERED AND [facility] CONFIRMED THEY CAN SEE THE ORDER. SHE CAN PRESENT TO THE OUTPATIENT LAB TO GET THAT TAKEN CARE OF.

## 2024-08-24 NOTE — H&P
"Chief Complaint  No chief complaint on file.    Subjective      Katie Rudd presents to [facility] for bilateral knee pain and osteoarthritis.  She was recently evaluated in office on 4/25/2024 with x-rays showing advanced degenerative bone-on-bone arthritis in the medial aspect of the knee.  She received a right knee steroid injection.  She presents independently ambulatory without use of assistive device.  Reports that her knees are \"about the same\".  She reports minimal and temporary relief with the steroid injection.  States it is to the point where she is having difficulties completing ADLs, including grocery shopping.  She states she is ready to proceed with surgery.    Objective   No Known Allergies    Vital Signs:   There were no vitals taken for this visit.      Physical Exam    Constitutional: Awake, alert. Well nourished appearance.    Integumentary: Warm, dry, intact. No obvious rashes.    HENT: Atraumatic, normocephalic.   Respiratory: Non labored respirations .   Cardiovascular: Intact peripheral pulses.    Psychiatric: Normal mood and affect. A&O X3    Ortho Exam  Right knee: Skin is warm, dry, and intact.  Mild edema bilaterally.  Tenderness to palpation of joint lines.  Valgus alignment.  Crepitus with ROM.  Palpable osteophytes.  Full to -2 degrees of extension and flexion 115-120 degrees.  Full plantarflexion and dorsiflexion the ankle.  Sensation and distal neurovascular intact.  Smooth sit to stand transition.  Patient fully weightbearing with antalgic gait.    Imaging Results (Most Recent)       None                      Assessment and Plan   Problem List Items Addressed This Visit    None        Follow Up   No follow-ups on file.    Tobacco Use: Low Risk  (8/19/2024)    Patient History     Smoking Tobacco Use: Never     Smokeless Tobacco Use: Never     Passive Exposure: Not on file       Educated on risk of smoking. Discussed options for smoking cessation.  Patient " Instructions   Recent x-rays reviewed.  Right knee x-rays showed grade 4 osteoarthritis with bone-on-bone findings in the medial compartment and advanced osteophyte formation present.  She did receive steroid injection with minimal relief.  She is complaining of significant knee instability and is requesting to proceed with total knee arthroplasty.  Did discuss with Dr. Platt, who is in agreement.  Orders placed.    Discussed surgery. Risks/benefits discussed with patient including, but not limited to: infection, bleeding, neurovascular damage, malunion, nonunion, aesthetic deformity, need for further surgery, and death. Discussed with patient the implant type being used during surgery and patient understands and desires to proceed. Surgery pamphlet provided to patient. Call or return if worsening symptoms.    Electronically signed by Glen Platt MD, 07/17/24, 3:05 PM EDT.

## 2024-08-25 ENCOUNTER — ANESTHESIA EVENT (OUTPATIENT)
Dept: PERIOP | Facility: HOSPITAL | Age: 74
End: 2024-08-25
Payer: MEDICARE

## 2024-08-26 ENCOUNTER — HOSPITAL ENCOUNTER (OUTPATIENT)
Facility: HOSPITAL | Age: 74
Discharge: HOME | End: 2024-08-27
Attending: ORTHOPAEDIC SURGERY | Admitting: ORTHOPAEDIC SURGERY
Payer: MEDICARE

## 2024-08-26 ENCOUNTER — ANESTHESIA EVENT CONVERTED (OUTPATIENT)
Dept: ANESTHESIOLOGY | Facility: HOSPITAL | Age: 74
End: 2024-08-26
Payer: MEDICARE

## 2024-08-26 ENCOUNTER — APPOINTMENT (OUTPATIENT)
Dept: GENERAL RADIOLOGY | Facility: HOSPITAL | Age: 74
End: 2024-08-26
Payer: MEDICARE

## 2024-08-26 ENCOUNTER — ANESTHESIA (OUTPATIENT)
Dept: PERIOP | Facility: HOSPITAL | Age: 74
End: 2024-08-26
Payer: MEDICARE

## 2024-08-26 DIAGNOSIS — R26.2 DIFFICULTY IN WALKING: ICD-10-CM

## 2024-08-26 DIAGNOSIS — M17.11 PRIMARY OSTEOARTHRITIS OF RIGHT KNEE: Primary | ICD-10-CM

## 2024-08-26 DIAGNOSIS — Z78.9 DECREASED ACTIVITIES OF DAILY LIVING (ADL): ICD-10-CM

## 2024-08-26 PROBLEM — Z96.659 S/P TKR (TOTAL KNEE REPLACEMENT): Status: ACTIVE | Noted: 2024-08-26

## 2024-08-26 PROCEDURE — 25010000002 SUGAMMADEX 200 MG/2ML SOLUTION: Performed by: NURSE ANESTHETIST, CERTIFIED REGISTERED

## 2024-08-26 PROCEDURE — 25010000002 DEXAMETHASONE PER 1 MG: Performed by: NURSE ANESTHETIST, CERTIFIED REGISTERED

## 2024-08-26 PROCEDURE — 97150 GROUP THERAPEUTIC PROCEDURES: CPT

## 2024-08-26 PROCEDURE — 94761 N-INVAS EAR/PLS OXIMETRY MLT: CPT

## 2024-08-26 PROCEDURE — 25010000002 ROPIVACAINE PER 1 MG: Performed by: ANESTHESIOLOGY

## 2024-08-26 PROCEDURE — 25010000002 KETOROLAC TROMETHAMINE PER 15 MG: Performed by: ORTHOPAEDIC SURGERY

## 2024-08-26 PROCEDURE — 25810000003 LACTATED RINGERS PER 1000 ML: Performed by: ANESTHESIOLOGY

## 2024-08-26 PROCEDURE — C1776 JOINT DEVICE (IMPLANTABLE): HCPCS | Performed by: ORTHOPAEDIC SURGERY

## 2024-08-26 PROCEDURE — 63710000001 ACETAMINOPHEN EXTRA STRENGTH 500 MG TABLET: Performed by: ORTHOPAEDIC SURGERY

## 2024-08-26 PROCEDURE — 25010000002 ONDANSETRON PER 1 MG: Performed by: NURSE ANESTHETIST, CERTIFIED REGISTERED

## 2024-08-26 PROCEDURE — C1713 ANCHOR/SCREW BN/BN,TIS/BN: HCPCS | Performed by: ORTHOPAEDIC SURGERY

## 2024-08-26 PROCEDURE — 25010000002 MORPHINE PER 10 MG: Performed by: ORTHOPAEDIC SURGERY

## 2024-08-26 PROCEDURE — 25010000002 FENTANYL CITRATE (PF) 50 MCG/ML SOLUTION: Performed by: NURSE ANESTHETIST, CERTIFIED REGISTERED

## 2024-08-26 PROCEDURE — A9270 NON-COVERED ITEM OR SERVICE: HCPCS | Performed by: PHYSICIAN ASSISTANT

## 2024-08-26 PROCEDURE — 63710000001 ATORVASTATIN 20 MG TABLET: Performed by: PHYSICIAN ASSISTANT

## 2024-08-26 PROCEDURE — 25810000003 LACTATED RINGERS PER 1000 ML: Performed by: ORTHOPAEDIC SURGERY

## 2024-08-26 PROCEDURE — 25010000002 MIDAZOLAM PER 1MG: Performed by: ANESTHESIOLOGY

## 2024-08-26 PROCEDURE — 99204 OFFICE O/P NEW MOD 45 MIN: CPT | Performed by: PHYSICIAN ASSISTANT

## 2024-08-26 PROCEDURE — 63710000001 SENNOSIDES-DOCUSATE 8.6-50 MG TABLET: Performed by: ORTHOPAEDIC SURGERY

## 2024-08-26 PROCEDURE — 25010000002 CEFAZOLIN PER 500 MG: Performed by: ORTHOPAEDIC SURGERY

## 2024-08-26 PROCEDURE — 25010000002 PROPOFOL 10 MG/ML EMULSION: Performed by: NURSE ANESTHETIST, CERTIFIED REGISTERED

## 2024-08-26 PROCEDURE — 25010000002 HYDROMORPHONE 1 MG/ML SOLUTION: Performed by: NURSE ANESTHETIST, CERTIFIED REGISTERED

## 2024-08-26 PROCEDURE — A9270 NON-COVERED ITEM OR SERVICE: HCPCS | Performed by: ORTHOPAEDIC SURGERY

## 2024-08-26 PROCEDURE — 97116 GAIT TRAINING THERAPY: CPT

## 2024-08-26 PROCEDURE — 25010000002 EPINEPHRINE 1 MG/ML SOLUTION: Performed by: ORTHOPAEDIC SURGERY

## 2024-08-26 PROCEDURE — 25010000002 PROCHLORPERAZINE 10 MG/2ML SOLUTION: Performed by: PHYSICIAN ASSISTANT

## 2024-08-26 PROCEDURE — 27447 TOTAL KNEE ARTHROPLASTY: CPT | Performed by: ORTHOPAEDIC SURGERY

## 2024-08-26 PROCEDURE — 73560 X-RAY EXAM OF KNEE 1 OR 2: CPT

## 2024-08-26 PROCEDURE — 25010000002 CEFAZOLIN PER 500 MG: Performed by: PHYSICIAN ASSISTANT

## 2024-08-26 PROCEDURE — 25010000002 ROPIVACAINE PER 1 MG: Performed by: ORTHOPAEDIC SURGERY

## 2024-08-26 PROCEDURE — 25010000002 ONDANSETRON PER 1 MG: Performed by: ORTHOPAEDIC SURGERY

## 2024-08-26 PROCEDURE — 94799 UNLISTED PULMONARY SVC/PX: CPT

## 2024-08-26 PROCEDURE — 97161 PT EVAL LOW COMPLEX 20 MIN: CPT

## 2024-08-26 DEVICE — BEAR TIB/KN VANGUARD AS 12X67MM NS: Type: IMPLANTABLE DEVICE | Site: KNEE | Status: FUNCTIONAL

## 2024-08-26 DEVICE — CAP TOTL KN CMT PRIMARY: Type: IMPLANTABLE DEVICE | Site: KNEE | Status: FUNCTIONAL

## 2024-08-26 DEVICE — CMT BONE PALACOS R HI/VISC 1X40: Type: IMPLANTABLE DEVICE | Site: KNEE | Status: FUNCTIONAL

## 2024-08-26 DEVICE — PAT 3PEG STD 8X28MM: Type: IMPLANTABLE DEVICE | Site: KNEE | Status: FUNCTIONAL

## 2024-08-26 DEVICE — COMP FEM/KN VANGUARD INTLK CR 62.5MM NS RT: Type: IMPLANTABLE DEVICE | Site: KNEE | Status: FUNCTIONAL

## 2024-08-26 DEVICE — TRY TIB CRUC 67MM: Type: IMPLANTABLE DEVICE | Site: KNEE | Status: FUNCTIONAL

## 2024-08-26 RX ORDER — KETOROLAC TROMETHAMINE 15 MG/ML
15 INJECTION, SOLUTION INTRAMUSCULAR; INTRAVENOUS EVERY 6 HOURS SCHEDULED
Status: COMPLETED | OUTPATIENT
Start: 2024-08-26 | End: 2024-08-27

## 2024-08-26 RX ORDER — FENTANYL CITRATE 50 UG/ML
INJECTION, SOLUTION INTRAMUSCULAR; INTRAVENOUS AS NEEDED
Status: DISCONTINUED | OUTPATIENT
Start: 2024-08-26 | End: 2024-08-26 | Stop reason: SURG

## 2024-08-26 RX ORDER — ONDANSETRON 4 MG/1
4 TABLET, ORALLY DISINTEGRATING ORAL EVERY 6 HOURS PRN
Status: DISCONTINUED | OUTPATIENT
Start: 2024-08-26 | End: 2024-08-27 | Stop reason: HOSPADM

## 2024-08-26 RX ORDER — SODIUM CHLORIDE 0.9 % (FLUSH) 0.9 %
10 SYRINGE (ML) INJECTION AS NEEDED
Status: DISCONTINUED | OUTPATIENT
Start: 2024-08-26 | End: 2024-08-26 | Stop reason: HOSPADM

## 2024-08-26 RX ORDER — ACETAMINOPHEN 500 MG
1000 TABLET ORAL EVERY 8 HOURS
Status: DISCONTINUED | OUTPATIENT
Start: 2024-08-26 | End: 2024-08-27 | Stop reason: HOSPADM

## 2024-08-26 RX ORDER — FERROUS SULFATE 325(65) MG
325 TABLET ORAL
Status: DISCONTINUED | OUTPATIENT
Start: 2024-08-26 | End: 2024-08-27 | Stop reason: HOSPADM

## 2024-08-26 RX ORDER — AMOXICILLIN 250 MG
2 CAPSULE ORAL 2 TIMES DAILY
Status: DISCONTINUED | OUTPATIENT
Start: 2024-08-26 | End: 2024-08-27 | Stop reason: HOSPADM

## 2024-08-26 RX ORDER — PROCHLORPERAZINE EDISYLATE 5 MG/ML
2.5 INJECTION INTRAMUSCULAR; INTRAVENOUS EVERY 6 HOURS PRN
Status: DISCONTINUED | OUTPATIENT
Start: 2024-08-26 | End: 2024-08-27 | Stop reason: HOSPADM

## 2024-08-26 RX ORDER — PROMETHAZINE HYDROCHLORIDE 25 MG/1
25 SUPPOSITORY RECTAL ONCE AS NEEDED
Status: DISCONTINUED | OUTPATIENT
Start: 2024-08-26 | End: 2024-08-26 | Stop reason: HOSPADM

## 2024-08-26 RX ORDER — ONDANSETRON 2 MG/ML
4 INJECTION INTRAMUSCULAR; INTRAVENOUS EVERY 6 HOURS PRN
Status: DISCONTINUED | OUTPATIENT
Start: 2024-08-26 | End: 2024-08-27 | Stop reason: HOSPADM

## 2024-08-26 RX ORDER — HYDROCODONE BITARTRATE AND ACETAMINOPHEN 7.5; 325 MG/1; MG/1
1 TABLET ORAL EVERY 4 HOURS PRN
Status: DISCONTINUED | OUTPATIENT
Start: 2024-08-26 | End: 2024-08-27 | Stop reason: HOSPADM

## 2024-08-26 RX ORDER — DEXMEDETOMIDINE HYDROCHLORIDE 4 UG/ML
INJECTION, SOLUTION INTRAVENOUS AS NEEDED
Status: DISCONTINUED | OUTPATIENT
Start: 2024-08-26 | End: 2024-08-26 | Stop reason: SURG

## 2024-08-26 RX ORDER — MIDAZOLAM HYDROCHLORIDE 2 MG/2ML
2 INJECTION, SOLUTION INTRAMUSCULAR; INTRAVENOUS ONCE
Status: COMPLETED | OUTPATIENT
Start: 2024-08-26 | End: 2024-08-26

## 2024-08-26 RX ORDER — ATORVASTATIN CALCIUM 20 MG/1
20 TABLET, FILM COATED ORAL NIGHTLY
Status: DISCONTINUED | OUTPATIENT
Start: 2024-08-26 | End: 2024-08-27 | Stop reason: HOSPADM

## 2024-08-26 RX ORDER — SODIUM CHLORIDE, SODIUM LACTATE, POTASSIUM CHLORIDE, CALCIUM CHLORIDE 600; 310; 30; 20 MG/100ML; MG/100ML; MG/100ML; MG/100ML
9 INJECTION, SOLUTION INTRAVENOUS CONTINUOUS PRN
Status: DISCONTINUED | OUTPATIENT
Start: 2024-08-26 | End: 2024-08-27 | Stop reason: HOSPADM

## 2024-08-26 RX ORDER — TRANEXAMIC ACID 10 MG/ML
1000 INJECTION, SOLUTION INTRAVENOUS ONCE
Status: COMPLETED | OUTPATIENT
Start: 2024-08-26 | End: 2024-08-26

## 2024-08-26 RX ORDER — ROPIVACAINE HYDROCHLORIDE 5 MG/ML
INJECTION, SOLUTION EPIDURAL; INFILTRATION; PERINEURAL
Status: COMPLETED | OUTPATIENT
Start: 2024-08-26 | End: 2024-08-26

## 2024-08-26 RX ORDER — ONDANSETRON 2 MG/ML
4 INJECTION INTRAMUSCULAR; INTRAVENOUS ONCE AS NEEDED
Status: DISCONTINUED | OUTPATIENT
Start: 2024-08-26 | End: 2024-08-26 | Stop reason: HOSPADM

## 2024-08-26 RX ORDER — MAGNESIUM HYDROXIDE 1200 MG/15ML
LIQUID ORAL AS NEEDED
Status: DISCONTINUED | OUTPATIENT
Start: 2024-08-26 | End: 2024-08-26 | Stop reason: HOSPADM

## 2024-08-26 RX ORDER — PROMETHAZINE HYDROCHLORIDE 12.5 MG/1
25 TABLET ORAL ONCE AS NEEDED
Status: DISCONTINUED | OUTPATIENT
Start: 2024-08-26 | End: 2024-08-26 | Stop reason: HOSPADM

## 2024-08-26 RX ORDER — ONDANSETRON 2 MG/ML
INJECTION INTRAMUSCULAR; INTRAVENOUS AS NEEDED
Status: DISCONTINUED | OUTPATIENT
Start: 2024-08-26 | End: 2024-08-26 | Stop reason: SURG

## 2024-08-26 RX ORDER — CELECOXIB 100 MG/1
200 CAPSULE ORAL ONCE
Status: COMPLETED | OUTPATIENT
Start: 2024-08-26 | End: 2024-08-26

## 2024-08-26 RX ORDER — LIDOCAINE HYDROCHLORIDE 20 MG/ML
INJECTION, SOLUTION EPIDURAL; INFILTRATION; INTRACAUDAL; PERINEURAL AS NEEDED
Status: DISCONTINUED | OUTPATIENT
Start: 2024-08-26 | End: 2024-08-26 | Stop reason: SURG

## 2024-08-26 RX ORDER — OXYCODONE HYDROCHLORIDE 5 MG/1
5 TABLET ORAL
Status: DISCONTINUED | OUTPATIENT
Start: 2024-08-26 | End: 2024-08-26 | Stop reason: HOSPADM

## 2024-08-26 RX ORDER — MIDAZOLAM HYDROCHLORIDE 2 MG/2ML
INJECTION, SOLUTION INTRAMUSCULAR; INTRAVENOUS AS NEEDED
Status: DISCONTINUED | OUTPATIENT
Start: 2024-08-26 | End: 2024-08-26 | Stop reason: SURG

## 2024-08-26 RX ORDER — DEXAMETHASONE SODIUM PHOSPHATE 4 MG/ML
INJECTION, SOLUTION INTRA-ARTICULAR; INTRALESIONAL; INTRAMUSCULAR; INTRAVENOUS; SOFT TISSUE AS NEEDED
Status: DISCONTINUED | OUTPATIENT
Start: 2024-08-26 | End: 2024-08-26 | Stop reason: SURG

## 2024-08-26 RX ORDER — FAMOTIDINE 20 MG/1
40 TABLET, FILM COATED ORAL DAILY
Status: DISCONTINUED | OUTPATIENT
Start: 2024-08-26 | End: 2024-08-27 | Stop reason: HOSPADM

## 2024-08-26 RX ORDER — PROPOFOL 10 MG/ML
VIAL (ML) INTRAVENOUS AS NEEDED
Status: DISCONTINUED | OUTPATIENT
Start: 2024-08-26 | End: 2024-08-26 | Stop reason: SURG

## 2024-08-26 RX ORDER — ROCURONIUM BROMIDE 10 MG/ML
INJECTION, SOLUTION INTRAVENOUS AS NEEDED
Status: DISCONTINUED | OUTPATIENT
Start: 2024-08-26 | End: 2024-08-26 | Stop reason: SURG

## 2024-08-26 RX ORDER — NALOXONE HCL 0.4 MG/ML
0.4 VIAL (ML) INJECTION
Status: DISCONTINUED | OUTPATIENT
Start: 2024-08-26 | End: 2024-08-27 | Stop reason: HOSPADM

## 2024-08-26 RX ORDER — ENOXAPARIN SODIUM 100 MG/ML
40 INJECTION SUBCUTANEOUS DAILY
Status: DISCONTINUED | OUTPATIENT
Start: 2024-08-27 | End: 2024-08-27 | Stop reason: HOSPADM

## 2024-08-26 RX ORDER — ACETAMINOPHEN 500 MG
1000 TABLET ORAL ONCE
Status: COMPLETED | OUTPATIENT
Start: 2024-08-26 | End: 2024-08-26

## 2024-08-26 RX ORDER — SODIUM CHLORIDE, SODIUM LACTATE, POTASSIUM CHLORIDE, CALCIUM CHLORIDE 600; 310; 30; 20 MG/100ML; MG/100ML; MG/100ML; MG/100ML
100 INJECTION, SOLUTION INTRAVENOUS CONTINUOUS
Status: DISCONTINUED | OUTPATIENT
Start: 2024-08-26 | End: 2024-08-27 | Stop reason: HOSPADM

## 2024-08-26 RX ORDER — SODIUM CHLORIDE 9 MG/ML
40 INJECTION, SOLUTION INTRAVENOUS AS NEEDED
Status: DISCONTINUED | OUTPATIENT
Start: 2024-08-26 | End: 2024-08-26 | Stop reason: HOSPADM

## 2024-08-26 RX ORDER — HYDROCODONE BITARTRATE AND ACETAMINOPHEN 7.5; 325 MG/1; MG/1
2 TABLET ORAL EVERY 4 HOURS PRN
Status: DISCONTINUED | OUTPATIENT
Start: 2024-08-26 | End: 2024-08-27 | Stop reason: HOSPADM

## 2024-08-26 RX ADMIN — CELECOXIB 200 MG: 100 CAPSULE ORAL at 06:57

## 2024-08-26 RX ADMIN — ACETAMINOPHEN 1000 MG: 500 TABLET ORAL at 06:57

## 2024-08-26 RX ADMIN — DEXMEDETOMIDINE HYDROCHLORIDE IN 0.9% SODIUM CHLORIDE 4 MCG: 4 INJECTION INTRAVENOUS at 08:40

## 2024-08-26 RX ADMIN — KETOROLAC TROMETHAMINE 15 MG: 15 INJECTION, SOLUTION INTRAMUSCULAR; INTRAVENOUS at 11:27

## 2024-08-26 RX ADMIN — MIDAZOLAM HYDROCHLORIDE 2 MG: 1 INJECTION, SOLUTION INTRAMUSCULAR; INTRAVENOUS at 07:14

## 2024-08-26 RX ADMIN — SODIUM CHLORIDE, POTASSIUM CHLORIDE, SODIUM LACTATE AND CALCIUM CHLORIDE 100 ML/HR: 600; 310; 30; 20 INJECTION, SOLUTION INTRAVENOUS at 15:00

## 2024-08-26 RX ADMIN — ONDANSETRON 4 MG: 2 INJECTION INTRAMUSCULAR; INTRAVENOUS at 17:37

## 2024-08-26 RX ADMIN — DEXMEDETOMIDINE HYDROCHLORIDE IN 0.9% SODIUM CHLORIDE 4 MCG: 4 INJECTION INTRAVENOUS at 07:48

## 2024-08-26 RX ADMIN — TRANEXAMIC ACID 1000 MG: 10 INJECTION, SOLUTION INTRAVENOUS at 08:18

## 2024-08-26 RX ADMIN — PROCHLORPERAZINE EDISYLATE 2.5 MG: 5 INJECTION INTRAMUSCULAR; INTRAVENOUS at 12:01

## 2024-08-26 RX ADMIN — ACETAMINOPHEN 1000 MG: 500 TABLET ORAL at 15:00

## 2024-08-26 RX ADMIN — ONDANSETRON 4 MG: 2 INJECTION INTRAMUSCULAR; INTRAVENOUS at 09:11

## 2024-08-26 RX ADMIN — FENTANYL CITRATE 50 MCG: 50 INJECTION, SOLUTION INTRAMUSCULAR; INTRAVENOUS at 07:20

## 2024-08-26 RX ADMIN — SENNOSIDES AND DOCUSATE SODIUM 2 TABLET: 50; 8.6 TABLET ORAL at 22:04

## 2024-08-26 RX ADMIN — SODIUM CHLORIDE, POTASSIUM CHLORIDE, SODIUM LACTATE AND CALCIUM CHLORIDE 9 ML/HR: 600; 310; 30; 20 INJECTION, SOLUTION INTRAVENOUS at 06:57

## 2024-08-26 RX ADMIN — SODIUM CHLORIDE 2 G: 9 INJECTION, SOLUTION INTRAVENOUS at 07:21

## 2024-08-26 RX ADMIN — ATORVASTATIN CALCIUM 20 MG: 20 TABLET, FILM COATED ORAL at 22:04

## 2024-08-26 RX ADMIN — FENTANYL CITRATE 50 MCG: 50 INJECTION, SOLUTION INTRAMUSCULAR; INTRAVENOUS at 07:37

## 2024-08-26 RX ADMIN — HYDROMORPHONE HYDROCHLORIDE 0.5 MG: 1 INJECTION, SOLUTION INTRAMUSCULAR; INTRAVENOUS; SUBCUTANEOUS at 07:43

## 2024-08-26 RX ADMIN — MIDAZOLAM HYDROCHLORIDE 2 MG: 1 INJECTION, SOLUTION INTRAMUSCULAR; INTRAVENOUS at 06:58

## 2024-08-26 RX ADMIN — ONDANSETRON HYDROCHLORIDE 4 MG: 2 SOLUTION INTRAMUSCULAR; INTRAVENOUS at 08:23

## 2024-08-26 RX ADMIN — KETOROLAC TROMETHAMINE 15 MG: 15 INJECTION, SOLUTION INTRAMUSCULAR; INTRAVENOUS at 17:37

## 2024-08-26 RX ADMIN — ROCURONIUM BROMIDE 50 MG: 10 INJECTION, SOLUTION INTRAVENOUS at 07:22

## 2024-08-26 RX ADMIN — PROPOFOL 120 MG: 10 INJECTION, EMULSION INTRAVENOUS at 07:22

## 2024-08-26 RX ADMIN — DEXMEDETOMIDINE HYDROCHLORIDE IN 0.9% SODIUM CHLORIDE 4 MCG: 4 INJECTION INTRAVENOUS at 08:37

## 2024-08-26 RX ADMIN — CEFAZOLIN 2000 MG: 2 INJECTION, POWDER, FOR SOLUTION INTRAVENOUS at 15:00

## 2024-08-26 RX ADMIN — LIDOCAINE HYDROCHLORIDE 80 MG: 20 INJECTION, SOLUTION INTRAVENOUS at 07:22

## 2024-08-26 RX ADMIN — PROPOFOL 30 MG: 10 INJECTION, EMULSION INTRAVENOUS at 08:06

## 2024-08-26 RX ADMIN — TRANEXAMIC ACID 1000 MG: 10 INJECTION, SOLUTION INTRAVENOUS at 06:58

## 2024-08-26 RX ADMIN — SUGAMMADEX 200 MG: 100 INJECTION, SOLUTION INTRAVENOUS at 08:39

## 2024-08-26 RX ADMIN — PROPOFOL 50 MG: 10 INJECTION, EMULSION INTRAVENOUS at 07:50

## 2024-08-26 RX ADMIN — ROPIVACAINE HYDROCHLORIDE 30 ML: 5 INJECTION, SOLUTION EPIDURAL; INFILTRATION; PERINEURAL at 07:11

## 2024-08-26 RX ADMIN — DEXAMETHASONE SODIUM PHOSPHATE 4 MG: 4 INJECTION, SOLUTION INTRAMUSCULAR; INTRAVENOUS at 07:22

## 2024-08-26 NOTE — ANESTHESIA POSTPROCEDURE EVALUATION
Patient: Katie Rudd    Procedure Summary       Date: 08/26/24 Room / Location: [facility] OR 03 / [facility] MAIN OR    Anesthesia Start: 0718 Anesthesia Stop: 0854    Procedure: RIGHT TOTAL KNEE ARTHROPLASTY. (Right: Knee) Diagnosis:       Primary osteoarthritis of right knee      (Primary osteoarthritis of right knee [M17.11])    Surgeons: Glen Platt MD Provider: Adilson Murphy MD    Anesthesia Type: general, regional ASA Status: 2            Anesthesia Type: general, regional    Vitals  Vitals Value Taken Time   /47 08/26/24 0932   Temp 35.9 °C (96.6 °F) 08/26/24 0852   Pulse 87 08/26/24 0933   Resp 16 08/26/24 0917   SpO2 95 % 08/26/24 0933   Vitals shown include unfiled device data.        Post Anesthesia Care and Evaluation    Patient location during evaluation: bedside  Patient participation: complete - patient participated  Level of consciousness: awake  Pain management: adequate    Airway patency: patent  PONV Status: none  Cardiovascular status: acceptable and stable  Respiratory status: acceptable  Hydration status: acceptable

## 2024-08-26 NOTE — ANESTHESIA PROCEDURE NOTES
Peripheral Block      Patient reassessed immediately prior to procedure    Patient location during procedure: pre-op  Start time: 8/26/2024 7:09 AM  Stop time: 8/26/2024 7:11 AM  Reason for block: at surgeon's request and post-op pain management  Performed by  Anesthesiologist: Adilson Murphy MD  Preanesthetic Checklist  Completed: patient identified, IV checked, site marked, risks and benefits discussed, surgical consent, monitors and equipment checked, pre-op evaluation and timeout performed  Prep:  Pt Position: supine  Sterile barriers:alcohol skin prep, partial drape, cap, washed/disinfected hands, mask and gloves  Prep: ChloraPrep  Patient monitoring: blood pressure monitoring, continuous pulse oximetry and EKG  Procedure    Sedation: yes  Performed under: local infiltration  Guidance:ultrasound guided and nerve stimulator    ULTRASOUND INTERPRETATION.  Using ultrasound guidance a 20 G gauge needle was placed in close proximity to the nerve, at which point, under ultrasound guidance anesthetic was injected in the area of the nerve and spread of the anesthesia was seen on ultrasound in close proximity thereto.  There were no abnormalities seen on ultrasound; a digital image was taken; and the patient tolerated the procedure with no complications. Images:still images obtained, printed/placed on chart    Laterality:right  Block Type:adductor canal block  Injection Technique:single-shot  Needle Type:echogenic  Needle Gauge:20 G (4in)  Resistance on Injection: none    Medications Used: ropivacaine (NAROPIN) 0.5 % injection - Injection   30 mL - 8/26/2024 7:11:00 AM      Post Assessment  Injection Assessment: negative aspiration for heme, no paresthesia on injection and incremental injection  Patient Tolerance:comfortable throughout block  Complications:no  Additional Notes  The block or continuous infusion is requested by the referring physician for management of postoperative pain, or pain related to a procedure.  Ultrasound guidance (deemed medically necessary). Painless injection, pt was awake and conversant during the procedure without complications. Needle and surrounding structures visualized throughout procedure. No adverse reactions or complications seen during this period. Post-procedure image showed no signs of complication, and anatomy was consistent with an uncomplicated nerve blockade.  Performed by: Adilson Murphy MD

## 2024-08-26 NOTE — THERAPY EVALUATION
Acute Care - Physical Therapy Initial Evaluation   [place]     Patient Name: Katie Rudd  : 1950  MRN: [id number]  Today's Date: 2024     Admit date: 2024     Referring Physician: Bryan Rios MD     Surgery Date:2024   Procedure(s) (LRB):  RIGHT TOTAL KNEE ARTHROPLASTY. (Right)          Visit Dx:     ICD-10-CM ICD-9-CM   1. Primary osteoarthritis of right knee  M17.11 715.16   2. Difficulty in walking  R26.2 719.7     Patient Active Problem List   Diagnosis    Primary osteoarthritis of right knee    S/P TKR (total knee replacement)     Past Medical History:   Diagnosis Date    Anxiety     Bladder dysfunction     Hyperlipidemia     Hypertension     PONV (postoperative nausea and vomiting)      Past Surgical History:   Procedure Laterality Date    BREAST BIOPSY Left     CATARACT EXTRACTION      COLONOSCOPY N/A 2024    Procedure: COLONOSCOPY WITH COLD/HOT SNARE AND BIOPSY;  Surgeon: Giorgio Garcia MD;  Location: [facility] ENDOSCOPY;  Service: General;  Laterality: N/A;  DIVERTICULOSIS, COLON POLYPS, ANAL MUCOSA ABNORMALITY    HYSTERECTOMY      NECK SURGERY      TONSILLECTOMY      TUBAL ABDOMINAL LIGATION       PT Assessment (Last 12 Hours)       PT Evaluation and Treatment       Row Name 24 1129          Physical Therapy Time and Intention    Subjective Information complains of;pain  -CHRIS     Document Type evaluation  -CHRIS     Mode of Treatment individual therapy;physical therapy  -CHRIS     Patient Effort good  -CHRIS     Symptoms Noted During/After Treatment none  -CHRIS       Row Name 24 1129          General Information    Patient Observations alert;cooperative;agree to therapy  -CHRIS     Prior Level of Function independent:;all household mobility;community mobility  -CHRIS     Equipment Currently Used at Home none  -CHRIS     Existing Precautions/Restrictions fall;weight bearing  -CHRIS     Barriers to Rehab none identified  -CHRIS       Row Name 24 1129           Living Environment    People in Home spouse  -CHRIS       Row Name 08/26/24 1129          Cognition    Orientation Status (Cognition) oriented x 3  -CHRIS       Row Name 08/26/24 1129          Range of Motion Comprehensive    General Range of Motion lower extremity range of motion deficits identified  R knee 5-90°  -CHRIS       Row Name 08/26/24 1129          Strength Comprehensive (MMT)    General Manual Muscle Testing (MMT) Assessment lower extremity strength deficits identified  RLE knee 3-/5 extsension; hip flexion 3-/5  -CHRIS       Row Name 08/26/24 1129          Mobility    Extremity Weight-bearing Status right lower extremity  -CHRIS     Right Lower Extremity (Weight-bearing Status) weight-bearing as tolerated (WBAT)  -CHRIS       Row Name 08/26/24 1129          Bed Mobility    Bed Mobility bed mobility (all) activities;supine-sit  -CHRIS     All Activities, [place] (Bed Mobility) contact guard  -CHRIS     Supine-Sit [place] (Bed Mobility) contact guard  -CHRIS       Row Name 08/26/24 1129          Transfers    Transfers bed-chair transfer;sit-stand transfer  -CHRIS       Row Name 08/26/24 1129          Bed-Chair Transfer    Bed-Chair [place] (Transfers) contact guard  -CHRIS     Assistive Device (Bed-Chair Transfers) walker, front-wheeled  -CHRIS       Row Name 08/26/24 1129          Sit-Stand Transfer    Sit-Stand [place] (Transfers) contact guard  -CHRIS     Assistive Device (Sit-Stand Transfers) walker, front-wheeled  -CHRIS       Row Name 08/26/24 1129          Gait/Stairs (Locomotion)    Gait/Stairs Locomotion gait/ambulation assistive device  -CHRIS     [place] Level (Gait) contact guard  -CHRIS     Assistive Device (Gait) walker, front-wheeled  -CHRIS     Patient was able to Ambulate yes  -CHRIS     Distance in Feet (Gait) 40  -CHRIS     Pattern (Gait) step-to  -CHRIS       Row Name 08/26/24 1129          Safety Issues, Functional Mobility    Impairments Affecting Function (Mobility) balance;pain;range of motion (ROM);strength  -CHRIS        Row Name 08/26/24 1129          Balance    Balance Assessment standing dynamic balance  -CHRIS     Dynamic Standing Balance contact guard  -CHRIS     Position/Device Used, Standing Balance walker, front-wheeled  -CHRIS       Row Name             Wound 08/26/24 0740 Right anterior knee Incision    Wound - Properties Group Placement Date: 08/26/24  -BW Placement Time: 0740  -BW Present on Original Admission: N  -BW Side: Right  -BW Orientation: anterior  -BW Location: knee  -BW Primary Wound Type: Incision  -BW    Retired Wound - Properties Group Placement Date: 08/26/24  -BW Placement Time: 0740  -BW Present on Original Admission: N  -BW Side: Right  -BW Orientation: anterior  -BW Location: knee  -BW Primary Wound Type: Incision  -BW    Retired Wound - Properties Group Date first assessed: 08/26/24  -BW Time first assessed: 0740  -BW Present on Original Admission: N  -BW Side: Right  -BW Location: knee  -BW Primary Wound Type: Incision  -BW      Row Name 08/26/24 1129          Plan of Care Review    Plan of Care Reviewed With patient  -CHRIS     Outcome Evaluation Pt presents with decreased ROM, strength, transfers and ambulation.  Skilled PT services will be required to address these mobility deficits.  -CHRIS       Row Name 08/26/24 1129          Therapy Assessment/Plan (PT)    Patient/Family Therapy Goals Statement (PT) walk without pain  -CHRIS     Rehab Potential (PT) good, to achieve stated therapy goals  -CHRIS     Criteria for Skilled Interventions Met (PT) skilled treatment is necessary  -CHRIS     Therapy Frequency (PT) 2 times/day  -CHRIS     Predicted Duration of Therapy Intervention (PT) 10 days  -CHRIS     Problem List (PT) problems related to;balance;mobility;range of motion (ROM);strength;pain  -CHRIS     Activity Limitations Related to Problem List (PT) unable to ambulate safely;unable to transfer safely  -CHRIS       Row Name 08/26/24 1129          PT Evaluation Complexity    History, PT Evaluation Complexity no personal factors  and/or comorbidities  -CHRIS     Examination of Body Systems (PT Eval Complexity) total of 4 or more elements  -CHRIS     Clinical Presentation (PT Evaluation Complexity) stable  -CHRIS     Clinical Decision Making (PT Evaluation Complexity) low complexity  -CHRIS     Overall Complexity (PT Evaluation Complexity) low complexity  -CHRIS       Row Name 08/26/24 1129          Therapy Plan Review/Discharge Plan (PT)    Therapy Plan Review (PT) evaluation/treatment results reviewed;participants voiced agreement with care plan;participants included;patient  -CHRIS       Row Name 08/26/24 1129          Physical Therapy Goals    Transfer Goal Selection (PT) transfer, PT goal 1  -CHRIS     Gait Training Goal Selection (PT) gait training, PT goal 1  -CHRIS     ROM Goal Selection (PT) ROM, PT goal 1  -CHRIS     Strength Goal Selection (PT) strength, PT goal 1  -CHRIS       Row Name 08/26/24 1129          Transfer Goal 1 (PT)    Activity/Assistive Device (Transfer Goal 1, PT) transfers, all  -CHRIS     [place] Level/Cues Needed (Transfer Goal 1, PT) independent  -CHRIS     Time Frame (Transfer Goal 1, PT) long term goal (LTG);10 days  -CHRIS       Row Name 08/26/24 1129          Gait Training Goal 1 (PT)    Activity/Assistive Device (Gait Training Goal 1, PT) gait (walking locomotion);walker, rolling  -CHRIS     [place] Level (Gait Training Goal 1, PT) independent  -CHRIS     Distance (Gait Training Goal 1, PT) 300  -CHRIS     Time Frame (Gait Training Goal 1, PT) long term goal (LTG);10 days  -CHRIS       Row Name 08/26/24 1129          ROM Goal 1 (PT)    ROM Goal 1 (PT) Pt will demonstrate knee ROM from 0-110° on the affected side.  -CHRIS     Time Frame (ROM Goal 1, PT) long-term goal (LTG);10 days  -CHRIS       Row Name 08/26/24 1129          Strength Goal 1 (PT)    Strength Goal 1 (PT) Pt will demonstrate knee extension strength of 5/5 on the affected side.  -CHRIS     Time Frame (Strength Goal 1, PT) long term goal (LTG);10 days  -CHRIS               User Key  (r) =  Recorded By, (t) = Taken By, (c) = Cosigned By      Initials Name Provider Type    Vaughn Tiwari, RN Registered Nurse    Michael Zhou PT Physical Therapist                    Physical Therapy Education       Title: PT OT SLP Therapies (Done)       Topic: Physical Therapy (Done)       Point: Mobility training (Done)       Learning Progress Summary             Patient Acceptance, E,TB, VU by CHRIS at 8/26/2024 1153                         Point: Precautions (Done)       Learning Progress Summary             Patient Acceptance, E,TB, VU by CHRIS at 8/26/2024 1153                                         User Key       Initials Effective Dates Name Provider Type Discipline    CHRIS 06/03/21 -  Michael Mckenzie PT Physical Therapist PT                  PT Recommendation and Plan  Anticipated Discharge Disposition (PT): home with outpatient therapy services  Planned Therapy Interventions (PT): balance training, bed mobility training, gait training, home exercise program, stair training, ROM (range of motion), strengthening, stretching, transfer training  Therapy Frequency (PT): 2 times/day  Plan of Care Reviewed With: patient  Outcome Evaluation: Pt presents with decreased ROM, strength, transfers and ambulation.  Skilled PT services will be required to address these mobility deficits.   Outcome Measures       Row Name 08/26/24 1100             How much help from another person do you currently need...    Turning from your back to your side while in flat bed without using bedrails? 3  -CHRIS      Moving from lying on back to sitting on the side of a flat bed without bedrails? 3  -CHRIS      Moving to and from a bed to a chair (including a wheelchair)? 3  -CHRIS      Standing up from a chair using your arms (e.g., wheelchair, bedside chair)? 3  -CHRIS      Climbing 3-5 steps with a railing? 3  -CHRIS      To walk in hospital room? 3  -CHRIS      AM-PAC 6 Clicks Score (PT) 18  -CHRIS      Highest Level of Mobility Goal 6 --> Walk 10 steps  or more  -CHRIS         Functional Assessment    Outcome Measure Options AM-PAC 6 Clicks Basic Mobility (PT)  -CHRIS                User Key  (r) = Recorded By, (t) = Taken By, (c) = Cosigned By      Initials Name Provider Type    Michael Zhou, PT Physical Therapist                     Time Calculation:    PT Charges       Row Name 08/26/24 1150             Time Calculation    PT Received On 08/26/24  -CHRIS      PT Goal Re-Cert Due Date 09/04/24  -CHRIS         Untimed Charges    PT Eval/Re-eval Minutes 30  -CHRIS         Total Minutes    Untimed Charges Total Minutes 30  -CHRIS       Total Minutes 30  -CHRIS                User Key  (r) = Recorded By, (t) = Taken By, (c) = Cosigned By      Initials Name Provider Type    Michael Zhou, PT Physical Therapist                      PT G-Codes  Outcome Measure Options: AM-PAC 6 Clicks Basic Mobility (PT)  AM-PAC 6 Clicks Score (PT): 18    Michael Mckenzie, PT  8/26/2024

## 2024-08-26 NOTE — THERAPY TREATMENT NOTE
Acute Care - Physical Therapy Treatment Note   [place]     Patient Name: Katie Rudd  : 1950  MRN: [id number]  Today's Date: 2024      Visit Dx:     ICD-10-CM ICD-9-CM   1. Primary osteoarthritis of right knee  M17.11 715.16   2. Difficulty in walking  R26.2 719.7     Patient Active Problem List   Diagnosis    Primary osteoarthritis of right knee    S/P TKR (total knee replacement)     Past Medical History:   Diagnosis Date    Anxiety     Bladder dysfunction     Hyperlipidemia     Hypertension     PONV (postoperative nausea and vomiting)      Past Surgical History:   Procedure Laterality Date    BREAST BIOPSY Left     CATARACT EXTRACTION      COLONOSCOPY N/A 2024    Procedure: COLONOSCOPY WITH COLD/HOT SNARE AND BIOPSY;  Surgeon: Giorgio Garcia MD;  Location: [facility] ENDOSCOPY;  Service: General;  Laterality: N/A;  DIVERTICULOSIS, COLON POLYPS, ANAL MUCOSA ABNORMALITY    HYSTERECTOMY      NECK SURGERY      TONSILLECTOMY      TUBAL ABDOMINAL LIGATION       PT Assessment (Last 12 Hours)       PT Evaluation and Treatment       Row Name 24 1500 24 1129       Physical Therapy Time and Intention    Subjective Information complains of;pain  -CHRIS complains of;pain  -CHRIS    Document Type therapy note (daily note)  -CHRIS evaluation  -CHRIS    Mode of Treatment individual therapy;group therapy;physical therapy  -CHRIS individual therapy;physical therapy  -CHRIS    Patient Effort good  -CHRIS good  -CHRIS    Symptoms Noted During/After Treatment -- none  -CHRIS    Comment Gait training performed individually; therapeutic exercises performed in a group setting with 2 participants  -CHRIS --      Row Name 24 1500 24 1129       General Information    Patient Observations alert;cooperative;agree to therapy  -CHRIS alert;cooperative;agree to therapy  -CHRIS    Prior Level of Function -- independent:;all household mobility;community mobility  -CHRIS    Equipment Currently Used at Home -- none  -CHRIS    Existing  Precautions/Restrictions -- fall;weight bearing  -CHRIS    Barriers to Rehab -- none identified  -CHRIS      Row Name 08/26/24 1129          Living Environment    People in Home spouse  -CHRIS       Row Name 08/26/24 1129          Cognition    Orientation Status (Cognition) oriented x 3  -CHRIS       Row Name 08/26/24 1129          Range of Motion Comprehensive    General Range of Motion lower extremity range of motion deficits identified  R knee 5-90°  -CHRIS       Row Name 08/26/24 1129          Strength Comprehensive (MMT)    General Manual Muscle Testing (MMT) Assessment lower extremity strength deficits identified  RLE knee 3-/5 extsension; hip flexion 3-/5  -CHRIS       Row Name 08/26/24 1500 08/26/24 1129       Mobility    Extremity Weight-bearing Status right lower extremity  -CHRIS right lower extremity  -CHRIS    Right Lower Extremity (Weight-bearing Status) weight-bearing as tolerated (WBAT)  -CHRIS weight-bearing as tolerated (WBAT)  -CHRIS      Row Name 08/26/24 1129          Bed Mobility    Bed Mobility bed mobility (all) activities;supine-sit  -CHRIS     All Activities, [place] (Bed Mobility) contact guard  -CHRIS     Supine-Sit [place] (Bed Mobility) contact guard  -CHRIS       Row Name 08/26/24 1500 08/26/24 1129       Transfers    Transfers sit-stand transfer;stand-sit transfer  -CHRIS bed-chair transfer;sit-stand transfer  -CHRIS      Row Name 08/26/24 1500 08/26/24 1129       Bed-Chair Transfer    Bed-Chair [place] (Transfers) contact guard  -CHRIS contact guard  -CHRIS    Assistive Device (Bed-Chair Transfers) walker, front-wheeled  -CHRIS walker, front-wheeled  -CHRIS      Row Name 08/26/24 1500 08/26/24 1129       Sit-Stand Transfer    Sit-Stand [place] (Transfers) contact guard  -CHRIS contact guard  -CHRIS    Assistive Device (Sit-Stand Transfers) walker, front-wheeled  -CHRIS walker, front-wheeled  -CHRIS      Row Name 08/26/24 1500 08/26/24 1129       Gait/Stairs (Locomotion)    Gait/Stairs Locomotion gait/ambulation assistive device   -CHRIS gait/ambulation assistive device  -CHRIS    [place] Level (Gait) contact guard  -CHRIS contact guard  -CHRIS    Assistive Device (Gait) walker, front-wheeled  -CHRIS walker, front-wheeled  -CHRIS    Patient was able to Ambulate yes  -CHRIS yes  -CHRIS    Distance in Feet (Gait) 100  -CHRIS 40  -CHRIS    Pattern (Gait) step-to  -CHRIS step-to  -CHRIS      Row Name 08/26/24 1500 08/26/24 1129       Safety Issues, Functional Mobility    Impairments Affecting Function (Mobility) balance;pain;range of motion (ROM);strength  -CHRIS balance;pain;range of motion (ROM);strength  -CHRIS      Row Name 08/26/24 1500 08/26/24 1129       Balance    Balance Assessment standing dynamic balance  -CHRIS standing dynamic balance  -CHRIS    Dynamic Standing Balance contact guard  -CHRIS contact guard  -CHRIS    Position/Device Used, Standing Balance walker, front-wheeled  -CHRIS walker, front-wheeled  -CHRIS      Row Name 08/26/24 1500          Motor Skills    Therapeutic Exercise knee;hip;ankle  -CHRIS       Row Name 08/26/24 1500          Hip (Therapeutic Exercise)    Hip (Therapeutic Exercise) isometric exercises  -CHRIS     Hip Isometrics (Therapeutic Exercise) right;gluteal sets;10 repetitions;2 sets  -CHRIS       Row Name 08/26/24 1500          Knee (Therapeutic Exercise)    Knee (Therapeutic Exercise) isometric exercises;strengthening exercise  -CHRIS     Knee Isometrics (Therapeutic Exercise) right;quad sets;10 repetitions;2 sets  -CHRIS     Knee Strengthening (Therapeutic Exercise) right;heel slides;SLR (straight leg raise);SAQ (short arc quad);LAQ (long arc quad);10 repetitions;2 sets  -CHRIS       Row Name 08/26/24 1500          Ankle (Therapeutic Exercise)    Ankle (Therapeutic Exercise) AROM (active range of motion)  -CHRIS     Ankle AROM (Therapeutic Exercise) right;dorsiflexion;plantarflexion;10 repetitions;2 sets  -CHRIS       Row Name             Wound 08/26/24 0740 Right anterior knee Incision    Wound - Properties Group Placement Date: 08/26/24  -BW Placement Time: 0740  -BW Present on  Original Admission: N  -BW Side: Right  -BW Orientation: anterior  -BW Location: knee  -BW Primary Wound Type: Incision  -BW    Retired Wound - Properties Group Placement Date: 08/26/24  -BW Placement Time: 0740  -BW Present on Original Admission: N  -BW Side: Right  -BW Orientation: anterior  -BW Location: knee  -BW Primary Wound Type: Incision  -BW    Retired Wound - Properties Group Date first assessed: 08/26/24  -BW Time first assessed: 0740  -BW Present on Original Admission: N  -BW Side: Right  -BW Location: knee  -BW Primary Wound Type: Incision  -BW      Row Name 08/26/24 1129          Plan of Care Review    Plan of Care Reviewed With patient  -CHRIS     Outcome Evaluation Pt presents with decreased ROM, strength, transfers and ambulation.  Skilled PT services will be required to address these mobility deficits.  -CHRIS       Row Name 08/26/24 1129          Therapy Assessment/Plan (PT)    Patient/Family Therapy Goals Statement (PT) walk without pain  -CHRIS     Rehab Potential (PT) good, to achieve stated therapy goals  -CHRIS     Criteria for Skilled Interventions Met (PT) skilled treatment is necessary  -CHRIS     Therapy Frequency (PT) 2 times/day  -CHRIS     Predicted Duration of Therapy Intervention (PT) 10 days  -CHRIS     Problem List (PT) problems related to;balance;mobility;range of motion (ROM);strength;pain  -CHRIS     Activity Limitations Related to Problem List (PT) unable to ambulate safely;unable to transfer safely  -CHRIS       Row Name 08/26/24 1129          PT Evaluation Complexity    History, PT Evaluation Complexity no personal factors and/or comorbidities  -CHRIS     Examination of Body Systems (PT Eval Complexity) total of 4 or more elements  -CHRIS     Clinical Presentation (PT Evaluation Complexity) stable  -CHRIS     Clinical Decision Making (PT Evaluation Complexity) low complexity  -CHRIS     Overall Complexity (PT Evaluation Complexity) low complexity  -CHRIS       Row Name 08/26/24 1500          Progress Summary (PT)     Progress Toward Functional Goals (PT) progress toward functional goals is good  -CHRIS     Daily Progress Summary (PT) Pt is progressing well with their exercise program.  Will continue current plan of care.  -CHRIS       Row Name 08/26/24 1129          Therapy Plan Review/Discharge Plan (PT)    Therapy Plan Review (PT) evaluation/treatment results reviewed;participants voiced agreement with care plan;participants included;patient  -CHRIS       Row Name 08/26/24 1129          Physical Therapy Goals    Transfer Goal Selection (PT) transfer, PT goal 1  -CHRIS     Gait Training Goal Selection (PT) gait training, PT goal 1  -CHRIS     ROM Goal Selection (PT) ROM, PT goal 1  -CHRIS     Strength Goal Selection (PT) strength, PT goal 1  -CHRIS       Row Name 08/26/24 1129          Transfer Goal 1 (PT)    Activity/Assistive Device (Transfer Goal 1, PT) transfers, all  -CHRIS     [place] Level/Cues Needed (Transfer Goal 1, PT) independent  -CHRIS     Time Frame (Transfer Goal 1, PT) long term goal (LTG);10 days  -CHRIS       Row Name 08/26/24 1129          Gait Training Goal 1 (PT)    Activity/Assistive Device (Gait Training Goal 1, PT) gait (walking locomotion);walker, rolling  -CHRIS     [place] Level (Gait Training Goal 1, PT) independent  -CHRIS     Distance (Gait Training Goal 1, PT) 300  -CHRIS     Time Frame (Gait Training Goal 1, PT) long term goal (LTG);10 days  -CHRIS       Row Name 08/26/24 1129          ROM Goal 1 (PT)    ROM Goal 1 (PT) Pt will demonstrate knee ROM from 0-110° on the affected side.  -CHRIS     Time Frame (ROM Goal 1, PT) long-term goal (LTG);10 days  -CHRIS       Row Name 08/26/24 1129          Strength Goal 1 (PT)    Strength Goal 1 (PT) Pt will demonstrate knee extension strength of 5/5 on the affected side.  -CHRIS     Time Frame (Strength Goal 1, PT) long term goal (LTG);10 days  -CHRIS               User Key  (r) = Recorded By, (t) = Taken By, (c) = Cosigned By      Initials Name Provider Type    Vaughn Tiwari RN Registered  Nurse    Michael Zhou PT Physical Therapist                    Physical Therapy Education       Title: PT OT SLP Therapies (Done)       Topic: Physical Therapy (Done)       Point: Mobility training (Done)       Learning Progress Summary             Patient Acceptance, E,TB, VU by CHRIS at 8/26/2024 1153                         Point: Precautions (Done)       Learning Progress Summary             Patient Acceptance, E,TB, VU by CHRIS at 8/26/2024 1153                                         User Key       Initials Effective Dates Name Provider Type Discipline    CHRIS 06/03/21 -  Michael Mckenzie PT Physical Therapist PT                  PT Recommendation and Plan  Anticipated Discharge Disposition (PT): home with outpatient therapy services  Planned Therapy Interventions (PT): balance training, bed mobility training, gait training, home exercise program, stair training, ROM (range of motion), strengthening, stretching, transfer training  Therapy Frequency (PT): 2 times/day  Progress Summary (PT)  Progress Toward Functional Goals (PT): progress toward functional goals is good  Daily Progress Summary (PT): Pt is progressing well with their exercise program.  Will continue current plan of care.  Plan of Care Reviewed With: patient  Outcome Evaluation: Pt presents with decreased ROM, strength, transfers and ambulation.  Skilled PT services will be required to address these mobility deficits.   Outcome Measures       Row Name 08/26/24 1500 08/26/24 1100          How much help from another person do you currently need...    Turning from your back to your side while in flat bed without using bedrails? 3  -CHRIS 3  -CHRIS     Moving from lying on back to sitting on the side of a flat bed without bedrails? 3  -CHRIS 3  -CHRIS     Moving to and from a bed to a chair (including a wheelchair)? 3  -CHRIS 3  -CHRIS     Standing up from a chair using your arms (e.g., wheelchair, bedside chair)? 3  -CHRIS 3  -CHRIS     Climbing 3-5 steps with a railing? 3   -CHRIS 3  -CHRIS     To walk in hospital room? 3  -CHRIS 3  -CHRIS     AM-PAC 6 Clicks Score (PT) 18  -CHRIS 18  -CHRIS     Highest Level of Mobility Goal 6 --> Walk 10 steps or more  -CHRIS 6 --> Walk 10 steps or more  -CHRIS        Functional Assessment    Outcome Measure Options AM-PAC 6 Clicks Basic Mobility (PT)  -CHRIS AM-PAC 6 Clicks Basic Mobility (PT)  -CHRIS               User Key  (r) = Recorded By, (t) = Taken By, (c) = Cosigned By      Initials Name Provider Type    Michael Zhou, PT Physical Therapist                     Time Calculation:    PT Charges       Row Name 08/26/24 1509 08/26/24 1150          Time Calculation    PT Received On 08/26/24  -CHRIS 08/26/24  -CHRIS     PT Goal Re-Cert Due Date -- 09/04/24  -CHRIS        Timed Charges    84188 - Gait Training Minutes  10  -CHRIS --        Untimed Charges    PT Eval/Re-eval Minutes -- 30  -CHRIS     PT Group Therapy Minutes 25  -CHRIS --        Total Minutes    Timed Charges Total Minutes 10  -CHRIS --     Untimed Charges Total Minutes 25  -CHRIS 30  -CHRIS      Total Minutes 35  -CHRIS 30  -CHRIS               User Key  (r) = Recorded By, (t) = Taken By, (c) = Cosigned By      Initials Name Provider Type    Michael Zhou, PT Physical Therapist                  Therapy Charges for Today       Code Description Service Date Service Provider Modifiers Qty    95136003184 HC PT EVAL LOW COMPLEXITY 3 8/26/2024 Michael Mckenzie, PT GP 1            PT G-Codes  Outcome Measure Options: AM-PAC 6 Clicks Basic Mobility (PT)  AM-PAC 6 Clicks Score (PT): 18    Michael Mckenzie, PT  8/26/2024

## 2024-08-26 NOTE — OP NOTE
TOTAL KNEE ARTHROPLASTY  Procedure Report    Patient Name:  Katie Rudd  YOB: 1950    Date of Surgery:  8/26/2024     Indications:  Severe OA, failed conservative treatment    Pre-op Diagnosis:   Primary osteoarthritis of right knee [M17.11]       Post-Op Diagnosis Codes:     * Primary osteoarthritis of right knee [M17.11]    Procedure/CPT® Codes:      Procedure(s):  RIGHT TOTAL KNEE ARTHROPLASTY.    Surgical Approach: Knee Medial Parapatellar        Staff:  Surgeon(s):  Glen Platt MD    Assistant: Mae Sun    Anesthesia: General with Block    Estimated Blood Loss: 75 mL    Implants:    Implant Name Type Inv. Item Serial No.  Lot No. LRB No. Used Action   CMT BONE PALACOS R HI/VISC 1X40 - [id number] Implant CMT BONE PALACOS R HI/VISC 1X40  [facility] [id number] Right 2 Implanted   TRY TIB CRUC 67MM - [id number] Implant TRY TIB CRUC 67MM  [employer] [id number] Right 1 Implanted   COMP FEM/KN VANGUARD INTLK CR 62.5MM NS RT - [id number] Implant COMP FEM/KN VANGUARD INTLK CR 62.5MM NS RT  [employer] [id number] Right 1 Implanted   PAT 3PEG STD 8X28MM - [id number] Implant PAT 3PEG STD 8X28MM  [employer] [id number] Right 1 Implanted   BEAR TIB/KN VANGUARD AS 04O63IX NS - [id number] Implant BEAR TIB/KN VANGUARD AS [id number] NS  [employer] [id number] Right 1 Implanted       Specimen:          None        Findings: advance OA    Complications: None    Description of Procedure: The patient was brought to the operating room and underwent general anesthesia, had a preoperative antibiotic, and was then prepped and draped in sterile fashion. An Esmarch was used to exsanguinate the limb. The tourniquet was then inflated. A standard medial parapatellar arthrotomy was performed. An intramedullary guide was placed in the femur. A distal femoral cut was made and measured. The 4-in-1 block was placed. Excellent cuts were achieved. Bone was removed, followed by removal of the ACL  and remaining menisci. The intramedullary guide was placed in the tibia. The tibia was then cut and measured. This was then broached. The patella was then osteotomized, removing approximately 8-10 mm of bone. It measured. There was excellent range of motion, tracking and stability of the trials. The trials were removed. Bony cut surfaces were thoroughly irrigated. The knee was then injected. The cement was vacuum mixed, placed on the undersurface of the components and then packed into place. Excess cement was removed. Once this had hardened, trial polys were tried and the appropriate sized anterior insert was then chosen. This was then locked, thoroughly irrigated. Bovie electrocautery was used for hemostasis. The tourniquet was deflated. This was again thoroughly irrigated and closed using #1 Vicryl, 2-0 Vicryl and staples. A sterile dressing was applied. The patient was then extubated and taken to the recovery room in stable condition. There were no complications.    Assistant: Mae Sun  was responsible for performing the following activities: Retraction, Suction, Irrigation, Closing, and Placing Dressing and their skilled assistance was necessary for the success of this case.    Glen Platt MD     Date: 8/26/2024  Time: 08:46 EDT

## 2024-08-26 NOTE — ANESTHESIA PREPROCEDURE EVALUATION
Anesthesia Evaluation     Patient summary reviewed and Nursing notes reviewed   history of anesthetic complications:  PONV  NPO Solid Status: > 8 hours  NPO Liquid Status: > 2 hours           Airway   Mallampati: II  TM distance: <3 FB  Neck ROM: full  No difficulty expected  Dental      Pulmonary - negative pulmonary ROS and normal exam    breath sounds clear to auscultation  Cardiovascular - normal exam  Exercise tolerance: good (4-7 METS)    ECG reviewed  Rhythm: regular  Rate: normal    (+) hypertension, hyperlipidemia      Neuro/Psych- negative ROS  GI/Hepatic/Renal/Endo    (+) obesity    Musculoskeletal     Abdominal    Substance History - negative use     OB/GYN negative ob/gyn ROS         Other   arthritis,     ROS/Med Hx Other: HX HTN,ANXIETY. METS<4 DUE TO PAIN. SEEN DR. LOPEZ IN PAST LAST VISIT 2016. CATH 2004 - STATED [facility]. ELM K+ 6.1 8/19/24.K+ RECHECK 8/22/24 5.0.  KT               Anesthesia Plan    ASA 2     general and regional     (Patient understands anesthesia not responsible for dental damage. Regional anesthesia options discussed with patient. Pt accepts regional block.)  intravenous induction     Anesthetic plan, risks, benefits, and alternatives have been provided, discussed and informed consent has been obtained with: patient.    Use of blood products discussed with patient .    Plan discussed with CRNA.    CODE STATUS:

## 2024-08-26 NOTE — CONSULTS
" [facility]   HOSPITALIST HISTORY AND PHYSICAL  Date: 2024   Patient Name: Katie Rudd  : 1950  MRN: [id number]  Primary Care Physician:  Jesu Tierney MD  Date of admission: 2024    Subjective   Subjective   Chief Complaint: Medical management    HPI:  Katie \"Gela\" Blaire is a 74 y.o. female who is being seen by hospitalist for general medical management of chronic medical issues including HTN, dyslipidemia, anxiety, bladder dysfunction.  She also has a history of osteoarthritis R knee and had her right knee replaced 24 by Dr. Platt.  Recent lab work reviewed and includes potassium 5.0.  Creatinine 0.97.  A1c 5.6.  Hgb 15.1.  AST 22.  ALT 24.  No recent lipid panel.  Resting EKG 7/15/24 shows normal sinus rhythm rate 59 bpm, WI interval 131.  No ST segment -T wave abnormalities.  Current vital signs include 126/51, RR 16, P60 9 bpm, T97.9, sats 95% on 2 L.    Currently resting in recliner.  A little sleepy after surgery earlier today.   at bedside.  Alert and appropriate conversation.  Right knee pain reasonably controlled.  Minimal oral intake today.  Has had nausea, requiring Zofran, then Compazine.  Denies any chest pains shortness of breath wheeze or palpitations.  Has ambulated short distances on new knee.  Pertinent History   Past Medical History:    Active Ambulatory Problems     Diagnosis Date Noted    Primary osteoarthritis of right knee 2024     Resolved Ambulatory Problems     Diagnosis Date Noted    Screening for malignant neoplasm of colon 2023    History of colonic polyps 2023     Past Medical History:   Diagnosis Date    Anxiety     Bladder dysfunction     Hyperlipidemia     Hypertension     PONV (postoperative nausea and vomiting)        Past Surgical History:    Past Surgical History:   Procedure Laterality Date    BREAST BIOPSY Left     CATARACT EXTRACTION  2013    COLONOSCOPY N/A 2024    Procedure: COLONOSCOPY WITH " COLD/HOT SNARE AND BIOPSY;  Surgeon: Giorgio Garcia MD;  Location: [facility] ENDOSCOPY;  Service: General;  Laterality: N/A;  DIVERTICULOSIS, COLON POLYPS, ANAL MUCOSA ABNORMALITY    HYSTERECTOMY      NECK SURGERY      TONSILLECTOMY      TUBAL ABDOMINAL LIGATION         Social History:     Lives with  in [facility]  Non-smoker  Retired.  Worked as .  Social History     Socioeconomic History    Marital status:    Tobacco Use    Smoking status: Never    Smokeless tobacco: Never   Vaping Use    Vaping status: Never Used   Substance and Sexual Activity    Alcohol use: Not Currently    Drug use: Never    Sexual activity: Defer       Family History:     Family History   Problem Relation Age of Onset    Breast cancer Mother     Maljocelin Hyperthermia Neg Hx        Home Medications (reported)  Current Outpatient Medications   Medication Instructions    amLODIPine (NORVASC) 5 mg, Oral, Daily    atorvastatin (LIPITOR) 20 mg, Oral, Nightly    Gemtesa 75 MG tablet 1 tablet, Oral, Daily    sertraline (ZOLOFT) 50 mg, Oral, Daily       Allergies:  No Known Allergies    REVIEW OF SYSTEMS:   Right knee pain    Objective   Objective   Vitals:   Temp:  [96.6 °F (35.9 °C)-98 °F (36.7 °C)] 97.9 °F (36.6 °C)  Heart Rate:  [] 69  Resp:  [15-18] 16  BP: (102-147)/(39-64) 126/51  Flow (L/min):  [2] 2  PHYSICAL EXAM   CON: WN. WD. NAD.   NECK:  No thyromegaly. No stridor. Trachea midline.  RESP:  CTA. No wheezes. No crackles.  No work of breathing or tachypnea.   CV:  Rhythm regular. Rate WNL. No murmur noted.  No edema.  GI:  Soft and nontender. Nondistended.  EXT: Right knee dressing intact  PSYCH:  Alert. Oriented. Normal affect and mood.  NEURO:  No dysarthria or aphasia. No unilateral weakness or paresthesia.  SKIN: No chronic venous stasis changes or varicosities.  No cellulitis    Result Review    Result Review:  I have personally reviewed the results from the time of this admission to 8/26/2024  10:51 EDT and agree with these findings:  []  Laboratory  []  Microbiology  []  Radiology  []  EKG/Telemetry   []  Cardiology/Vascular   []  Pathology  []  Old records  []  Other:    Assessment & Plan   Assessment / Plan   Assessment:    Medical management  HTN  Dyslipidemia  Anxiety  Overactive bladder  Colonic polyps (polypectomy/adenomas.  Dr. Garcia)  OA right knee  Right knee replacement 8/26/24 by Dr. Platt     Plan:    Antiemetics as needed.  Hold home amlodipine.  Monitor blood pressure trend.  Gentle IV fluid hydration.  Resume when BP allows.  Check a.m. BMP  Continue statin  Monitor for urinary retention  Recommend daily physical therapy  DVT prophylaxis per orthopedist  Pain med Rx per orthopedist    VTE Prophylaxis:  Pharmacologic & mechanical VTE prophylaxis orders are present.      CODE STATUS:         Electronically signed by CASANDRA Hood, 08/26/24, 10:51 AM EDT.

## 2024-08-26 NOTE — PLAN OF CARE
Goal Outcome Evaluation:   Patient has had no new changes throughout shift and continues to remain stable. Patient had right TKA performed by Dr. Platt. Patient has had no complaints of pain since arriving to the floor. Patient has had complaints of nausea and vomiting. Patient has been medicated x2 for N/V. Patient is x1 assist with walker and gait belt and has voided. Patient plans to DC home tomorrow with spouse as ride. Patient will need Dme walker and BSC. Patient will use meds to bed and do outpatient therapy.

## 2024-08-27 VITALS
RESPIRATION RATE: 18 BRPM | TEMPERATURE: 98.1 F | BODY MASS INDEX: 31.18 KG/M2 | OXYGEN SATURATION: 92 % | HEART RATE: 65 BPM | DIASTOLIC BLOOD PRESSURE: 48 MMHG | WEIGHT: 194 LBS | SYSTOLIC BLOOD PRESSURE: 120 MMHG | HEIGHT: 66 IN

## 2024-08-27 LAB
ANION GAP SERPL CALCULATED.3IONS-SCNC: 8.8 MMOL/L (ref 5–15)
BUN SERPL-MCNC: 14 MG/DL (ref 8–23)
BUN/CREAT SERPL: 19.4 (ref 7–25)
CALCIUM SPEC-SCNC: 8.3 MG/DL (ref 8.6–10.5)
CHLORIDE SERPL-SCNC: 101 MMOL/L (ref 98–107)
CO2 SERPL-SCNC: 25.2 MMOL/L (ref 22–29)
CREAT SERPL-MCNC: 0.72 MG/DL (ref 0.57–1)
DEPRECATED RDW RBC AUTO: 47.3 FL (ref 37–54)
EGFRCR SERPLBLD CKD-EPI 2021: 87.9 ML/MIN/1.73
ERYTHROCYTE [DISTWIDTH] IN BLOOD BY AUTOMATED COUNT: 14.1 % (ref 12.3–15.4)
GLUCOSE SERPL-MCNC: 103 MG/DL (ref 65–99)
HCT VFR BLD AUTO: 36.7 % (ref 34–46.6)
HGB BLD-MCNC: 11.9 G/DL (ref 12–15.9)
MCH RBC QN AUTO: 29.5 PG (ref 26.6–33)
MCHC RBC AUTO-ENTMCNC: 32.4 G/DL (ref 31.5–35.7)
MCV RBC AUTO: 90.8 FL (ref 79–97)
PLATELET # BLD AUTO: 156 10*3/MM3 (ref 140–450)
PMV BLD AUTO: 10.1 FL (ref 6–12)
POTASSIUM SERPL-SCNC: 4 MMOL/L (ref 3.5–5.2)
RBC # BLD AUTO: 4.04 10*6/MM3 (ref 3.77–5.28)
SODIUM SERPL-SCNC: 135 MMOL/L (ref 136–145)
WBC NRBC COR # BLD AUTO: 8.09 10*3/MM3 (ref 3.4–10.8)

## 2024-08-27 PROCEDURE — 97150 GROUP THERAPEUTIC PROCEDURES: CPT

## 2024-08-27 PROCEDURE — 97535 SELF CARE MNGMENT TRAINING: CPT

## 2024-08-27 PROCEDURE — 25010000002 CEFAZOLIN PER 500 MG: Performed by: ORTHOPAEDIC SURGERY

## 2024-08-27 PROCEDURE — 63710000001 HYDROCODONE-ACETAMINOPHEN 7.5-325 MG TABLET: Performed by: ORTHOPAEDIC SURGERY

## 2024-08-27 PROCEDURE — 97116 GAIT TRAINING THERAPY: CPT

## 2024-08-27 PROCEDURE — 25010000002 ENOXAPARIN PER 10 MG: Performed by: ORTHOPAEDIC SURGERY

## 2024-08-27 PROCEDURE — 99214 OFFICE O/P EST MOD 30 MIN: CPT | Performed by: PHYSICIAN ASSISTANT

## 2024-08-27 PROCEDURE — A9270 NON-COVERED ITEM OR SERVICE: HCPCS | Performed by: ORTHOPAEDIC SURGERY

## 2024-08-27 PROCEDURE — 63710000001 FAMOTIDINE 20 MG TABLET: Performed by: ORTHOPAEDIC SURGERY

## 2024-08-27 PROCEDURE — 85027 COMPLETE CBC AUTOMATED: CPT | Performed by: PHYSICIAN ASSISTANT

## 2024-08-27 PROCEDURE — 63710000001 SERTRALINE 50 MG TABLET: Performed by: PHYSICIAN ASSISTANT

## 2024-08-27 PROCEDURE — 63710000001 FERROUS SULFATE 325 (65 FE) MG TABLET: Performed by: ORTHOPAEDIC SURGERY

## 2024-08-27 PROCEDURE — 63710000001 SENNOSIDES-DOCUSATE 8.6-50 MG TABLET: Performed by: ORTHOPAEDIC SURGERY

## 2024-08-27 PROCEDURE — A9270 NON-COVERED ITEM OR SERVICE: HCPCS | Performed by: PHYSICIAN ASSISTANT

## 2024-08-27 PROCEDURE — 97165 OT EVAL LOW COMPLEX 30 MIN: CPT

## 2024-08-27 PROCEDURE — 80048 BASIC METABOLIC PNL TOTAL CA: CPT | Performed by: ORTHOPAEDIC SURGERY

## 2024-08-27 PROCEDURE — 25010000002 KETOROLAC TROMETHAMINE PER 15 MG: Performed by: ORTHOPAEDIC SURGERY

## 2024-08-27 RX ORDER — HYDROCODONE BITARTRATE AND ACETAMINOPHEN 7.5; 325 MG/1; MG/1
1-2 TABLET ORAL EVERY 4 HOURS PRN
Qty: 40 TABLET | Refills: 0 | Status: SHIPPED | OUTPATIENT
Start: 2024-08-27

## 2024-08-27 RX ADMIN — KETOROLAC TROMETHAMINE 15 MG: 15 INJECTION, SOLUTION INTRAMUSCULAR; INTRAVENOUS at 00:20

## 2024-08-27 RX ADMIN — ENOXAPARIN SODIUM 40 MG: 100 INJECTION SUBCUTANEOUS at 08:55

## 2024-08-27 RX ADMIN — SERTRALINE HYDROCHLORIDE 50 MG: 50 TABLET ORAL at 08:55

## 2024-08-27 RX ADMIN — CEFAZOLIN 2000 MG: 2 INJECTION, POWDER, FOR SOLUTION INTRAVENOUS at 00:21

## 2024-08-27 RX ADMIN — KETOROLAC TROMETHAMINE 15 MG: 15 INJECTION, SOLUTION INTRAMUSCULAR; INTRAVENOUS at 06:00

## 2024-08-27 RX ADMIN — HYDROCODONE BITARTRATE AND ACETAMINOPHEN 1 TABLET: 7.5; 325 TABLET ORAL at 08:55

## 2024-08-27 RX ADMIN — FERROUS SULFATE TAB 325 MG (65 MG ELEMENTAL FE) 325 MG: 325 (65 FE) TAB at 08:55

## 2024-08-27 RX ADMIN — SENNOSIDES AND DOCUSATE SODIUM 2 TABLET: 50; 8.6 TABLET ORAL at 08:55

## 2024-08-27 RX ADMIN — FAMOTIDINE 40 MG: 20 TABLET ORAL at 08:55

## 2024-08-27 NOTE — PLAN OF CARE
Goal Outcome Evaluation:   Patient has had no new changes throughout shift and continues to remain stable. Patient is POD1 after having right TKA performed by Dr. Platt. Patient has had complaints of pain during shift that has been controlled with prn medication. Patient has been medicated x1 during shift. Patient is x1 assist with walker and gait belt. Patient has ambulated approximately 100 ft during shift. Patient has voided without difficulty. Patient plans to DC home this afternoon with spouse as ride. Patient will use meds to bed and will need DME walker and 3 in 1. Patient will be doing outpatient therapy.

## 2024-08-27 NOTE — PROGRESS NOTES
Orthopedic Total Knee Progress Note    Assessment/Plan outpatient PT in [place], DVT prophylaxis, weight-bear as tolerated, follow-up 2 weeks    Status post-right total knee arthroplasty: Doing well postoperatively.    Pain Relief: some relief    Continues current post-op course    Activity: up with assistance    Weight Bearing: WBAT     LOS: 0 days     Subjective     Post-Operative Day: 1 post-right total knee arthroplasty  Systemic or Specific Complaints: No Complaints    Objective     Vital signs in last 24 hours:  Vitals:    08/26/24 1900 08/26/24 1933 08/26/24 2355 08/27/24 0335   BP:  121/54 104/46 112/49   BP Location:  Left arm Left arm Left arm   Patient Position:  Sitting Lying Lying   Pulse:  57 54 56   Resp:  16 18 18   Temp:  97.2 °F (36.2 °C) 97.3 °F (36.3 °C) 97.7 °F (36.5 °C)   TempSrc:  Oral Oral Oral   SpO2: 95% 97% 95% 95%   Weight:       Height:            General: alert, appears stated age, and cooperative   Neurovascular: Tibial nerve: Intact, Superficial peroneal nerve: Intact, and Deep peroneal nerve: Intact  Capillary refill: Normal   Wound: Wound clean and dry no evidence of infection.   Range of Motion: Limited flexsion and Limited extension   DVT Exam: No evidence of DVT seen on physical exam.      WBC   Date Value Ref Range Status   08/27/2024 8.09 3.40 - 10.80 10*3/mm3 Final     RBC   Date Value Ref Range Status   08/27/2024 4.04 3.77 - 5.28 10*6/mm3 Final     Hemoglobin   Date Value Ref Range Status   08/27/2024 11.9 (L) 12.0 - 15.9 g/dL Final     Hematocrit   Date Value Ref Range Status   08/27/2024 36.7 34.0 - 46.6 % Final     MCV   Date Value Ref Range Status   08/27/2024 90.8 79.0 - 97.0 fL Final     MCH   Date Value Ref Range Status   08/27/2024 29.5 26.6 - 33.0 pg Final     MCHC   Date Value Ref Range Status   08/27/2024 32.4 31.5 - 35.7 g/dL Final     RDW   Date Value Ref Range Status   08/27/2024 14.1 12.3 - 15.4 % Final     RDW-SD   Date Value Ref Range Status  "  08/27/2024 47.3 37.0 - 54.0 fl Final     MPV   Date Value Ref Range Status   08/27/2024 10.1 6.0 - 12.0 fL Final     Platelets   Date Value Ref Range Status   08/27/2024 156 140 - 450 10*3/mm3 Final        Basic Metabolic Panel    Sodium Sodium   Date Value Ref Range Status   08/27/2024 135 (L) 136 - 145 mmol/L Final      Potassium Potassium   Date Value Ref Range Status   08/27/2024 4.0 3.5 - 5.2 mmol/L Final      Chloride Chloride   Date Value Ref Range Status   08/27/2024 101 98 - 107 mmol/L Final      Bicarbonate No results found for: \"PLASMABICARB\"   BUN BUN   Date Value Ref Range Status   08/27/2024 14 8 - 23 mg/dL Final      Creatinine Creatinine   Date Value Ref Range Status   08/27/2024 0.72 0.57 - 1.00 mg/dL Final      Calcium Calcium   Date Value Ref Range Status   08/27/2024 8.3 (L) 8.6 - 10.5 mg/dL Final      Glucose      No components found for: \"GLUCOSE.*\"      XR Knee 1 or 2 View Right   Final Result   Impression:   Normal-appearing knee arthroplasty.         Electronically Signed: Briana Ellsworth MD     8/26/2024 9:18 AM EDT     Workstation ID: [id number]           "

## 2024-08-27 NOTE — PLAN OF CARE
Goal Outcome Evaluation:  Plan of Care Reviewed With: patient        Progress: improving (Through ADL/transfer retraining, patient is now able to perform basic ADLs CGA/min assist with set up and cues for safe positioning and adaptive techniques.)  Outcome Evaluation: Patient presents with limitations of balance and standing endurance/activity tolerance which impede her ability to perform ADL and transfers as prior.  The skills of a therapist will be required to safely and effectively implement treatment plan to restore maximal level of function.      Anticipated Discharge Disposition (OT): home with assist (Outpatient PT)

## 2024-08-27 NOTE — PROGRESS NOTES
"[facility]   Hospitalist Progress Note       Patient Name: Katie Rudd  : 1950  MRN: [id number]  Primary Care Physician: Jesu Tierney MD  Date of admission: 2024  Today's Date: 2024  Room / Bed:   236/1  Subjective   Chief Complaint: Medical management     HPI:  Katie \"Gela\"Blaire is a 74 y.o. female who is being seen by hospitalist for general medical management of chronic medical issues including HTN, dyslipidemia, anxiety, bladder dysfunction.  She also has a history of osteoarthritis R knee and had her right knee replaced 24 by Dr. Platt.  Recent lab work reviewed and includes potassium 5.0.  Creatinine 0.97.  A1c 5.6.  Hgb 15.1.  AST 22.  ALT 24.  No recent lipid panel.  Resting EKG 7/15/24 shows normal sinus rhythm rate 59 bpm, UT interval 131.  No ST segment -T wave abnormalities.  Current vital signs include 126/51, RR 16, P60 9 bpm, T97.9, sats 95% on 2 L.    Interval Followup: 2024    No nausea or vomiting today.  Awake and alert.   at bedside.  Patient rested well last night.  Right knee pain reasonably controlled  Vital signs stable: /50  Morning creatinine 0.7  Denies any chest pains or shortness of air.  Denies any palpitations lightheadedness.  Denies any trouble voiding bladder      REVIEW OF SYSTEMS:   Right knee pain  Objective   Temp:  [97.2 °F (36.2 °C)-98.6 °F (37 °C)] 98.6 °F (37 °C)  Heart Rate:  [54-79] 68  Resp:  [16-18] 18  BP: (104-139)/(46-64) 118/50  Flow (L/min):  [2] 2  PHYSICAL EXAM   CON: WN. WD. NAD.   NECK:  No thyromegaly. No stridor.   RESP:  CTA. No wheezes. No crackles.    CV:  Rhythm regular. Rate WNL. No murmur noted.  No edema.  GI:  Soft and nontender. Nondistended.    EXT: RLE intact neurovascularly  PSYCH:  Alert. Oriented. Normal affect and mood.  NEURO:  No dysarthria or aphasia.   SKIN: No chronic venous stasis changes or varicosities.  No cellulitis  Results from last 7 days   Lab Units 24  8299 "   WBC 10*3/mm3 8.09   HEMOGLOBIN g/dL 11.9*   HEMATOCRIT % 36.7   PLATELETS 10*3/mm3 156     Results from last 7 days   Lab Units 08/27/24  0419 08/22/24  1425   SODIUM mmol/L 135* 140   POTASSIUM mmol/L 4.0 5.0   CO2 mmol/L 25.2 27.2   CHLORIDE mmol/L 101 104   ANION GAP mmol/L 8.8 8.8   BUN mg/dL 14 18   CREATININE mg/dL 0.72 0.97   GLUCOSE mg/dL 103* 118*         COMPLEXITY OF DATA / DECISION MAKING     []  Moderate: One acute illness or mild exacerbation of chronic or 2 stable chronic or tx side effects   []  High:  Severe acute illness or severe exacerbation of chronic - potential for major debility / life threatening         I have personally reviewed the results from the time of this admission to 8/27/2024 11:22 EDT:  []  Laboratory:  []  Microbiology: []  Radiology:  []  Telemetry:   []  Cardiology/Vascular:  []  Pathology:  []  Prior external records:  []  Independent historian provided additional details:      []  Discussed case with specialists:    []  Independent interpretation of ECG/Imaging etc:             []  Moderate: Rx management, low risk surgery, suboptimal social situation   []  High:  Rx with close monitoring for toxicity, mod-high risk surgery, DNR decision, Comfort initiated, IV pain meds    Assessment / Plan   Assessment:     Medical management  HTN  Dyslipidemia  Anxiety  Overactive bladder  Colonic polyps (polypectomy/adenomas.  Dr. Garcia)  OA right knee  Right knee replacement 8/26/24 by Dr. Platt     Plan:     Resume home medical regimen, including amlodipine.  Recommend checking blood pressure periodically/daily and discussing readings with PCP at routine follow-up.  Recommend daily physical therapy  DVT prophylaxis per orthopedist  Pain med Rx per orthopedist  VTE Prophylaxis:  Pharmacologic & mechanical VTE prophylaxis orders are present.      CODE STATUS:      Code Status (Patient has no pulse and is not breathing): CPR (Attempt to Resuscitate)  Medical Interventions (Patient has  pulse or is breathing): Full Support       Electronically signed by CASANDRA Hood, 08/27/24, 11:22 AM EDT.

## 2024-08-27 NOTE — THERAPY EVALUATION
Patient Name: Katie Rudd  : 1950    MRN: [id number]                              Today's Date: 2024       Admit Date: 2024    Visit Dx:     ICD-10-CM ICD-9-CM   1. Primary osteoarthritis of right knee  M17.11 715.16   2. Difficulty in walking  R26.2 719.7   3. Decreased activities of daily living (ADL)  Z78.9 V49.89     Patient Active Problem List   Diagnosis    Primary osteoarthritis of right knee    S/P TKR (total knee replacement)     Past Medical History:   Diagnosis Date    Anxiety     Bladder dysfunction     Hyperlipidemia     Hypertension     PONV (postoperative nausea and vomiting)      Past Surgical History:   Procedure Laterality Date    BREAST BIOPSY Left     CATARACT EXTRACTION      COLONOSCOPY N/A 2024    Procedure: COLONOSCOPY WITH COLD/HOT SNARE AND BIOPSY;  Surgeon: Giorgio Garcia MD;  Location: [facility] ENDOSCOPY;  Service: General;  Laterality: N/A;  DIVERTICULOSIS, COLON POLYPS, ANAL MUCOSA ABNORMALITY    HYSTERECTOMY      NECK SURGERY      TONSILLECTOMY      TOTAL KNEE ARTHROPLASTY Right 2024    Procedure: RIGHT TOTAL KNEE ARTHROPLASTY.;  Surgeon: Glen Platt MD;  Location: [facility] MAIN OR;  Service: Orthopedics;  Laterality: Right;    TUBAL ABDOMINAL LIGATION        General Information       Row Name 24 1306 24 1256       OT Time and Intention    Document Type therapy note (daily note)  -AV evaluation  -AV    Mode of Treatment individual therapy;occupational therapy  -AV individual therapy;occupational therapy  -AV      Row Name 24 1306 24 1256       General Information    Patient Profile Reviewed -- yes  -AV    Prior Level of Function -- independent:;ADL's;home management;transfer;all household mobility  stands to shower (tub). stands at sink to groom. standard commode. ambulates without assistive device. no home O2.  -AV    Existing Precautions/Restrictions -- fall  WBAT  -AV    Barriers to Rehab --  none: pleasant and  motivated  -AV none identified  -AV      Row Name 08/27/24 1256          Occupational Profile    Reason for Services/Referral (Occupational Profile) Patient is a 74 year old female admitted to [facility] on 8/26/24 with right knee pain. She is currently on 2N/ room air. OT consulted due to recent decline in ADL/ transfer independence. No previous OT services for current condition.  -AV       Row Name 08/27/24 1256          Living Environment    People in Home spouse  -AV       Row Name 08/27/24 1256          Home Main Entrance    Number of Stairs, Main Entrance one  back door  -AV       Row Name 08/27/24 1256          Stairs Within Home, Primary    Number of Stairs, Within Home, Primary none  -AV       Row Name 08/27/24 1306 08/27/24 1256       Cognition    Orientation Status (Cognition) --  receptive to cues for safe transfers, safe positioning and adaptive techniques throughout functional ADL session  -AV --  alert, pleasant and cooperative. able to retain information and follow commands.  -AV      Row Name 08/27/24 1256          Safety Issues, Functional Mobility    Impairments Affecting Function (Mobility) balance;endurance/activity tolerance  -AV               User Key  (r) = Recorded By, (t) = Taken By, (c) = Cosigned By      Initials Name Provider Type    AV Bart Nelson, OT Occupational Therapist                     Mobility/ADL's       Row Name 08/27/24 1307 08/27/24 1300       Transfers    Transfers sit-stand transfer;bed-chair transfer;toilet transfer  -AV --    Comment, (Transfers) -- CGA/RW  -AV      Row Name 08/27/24 1307          Bed-Chair Transfer    Bed-Chair Mathias (Transfers) contact guard;verbal cues  -AV     Assistive Device (Bed-Chair Transfers) walker, front-wheeled  -AV       Row Name 08/27/24 1307          Sit-Stand Transfer    Sit-Stand Mathias (Transfers) contact guard;verbal cues  -AV     Assistive Device (Sit-Stand Transfers) walker, front-wheeled  -AV      Comment, (Sit-Stand Transfer) x5 during functional ADL session. cues for safe hand placement.  -AV       Row Name 08/27/24 1307          Toilet Transfer    [place] Level (Toilet Transfer) contact guard;verbal cues  -AV     Assistive Device (Toilet Transfer) raised toilet seat  -AV       Row Name 08/27/24 1307          Functional Mobility    Functional Mobility- Comment ambulated to/from bathroom for toileting CGA/RW and cues for safe walker placement  -AV       Row Name 08/27/24 1307 08/27/24 1300       Activities of Daily Living    BADL Assessment/Intervention --  Independent upper body bathing/ dressing w setup while seated. CGA don/doff pants w cues for adaptive tech. independent doffing slipper socks w cues. min assist don personal socks. independent donning slip-on shoes. CGA toilet hygiene (elevated commode).  -AV --  Independent feeding, grooming and upper body bathing/ dressing with setup while seated. Min assist lower body bathing/ dressing. CGA toilet hygiene using elevated commode.  -AV      [facility] Name 08/27/24 1300          Mobility    Extremity Weight-bearing Status right lower extremity  -AV     Right Lower Extremity (Weight-bearing Status) weight-bearing as tolerated (WBAT)  -AV               User Key  (r) = Recorded By, (t) = Taken By, (c) = Cosigned By      Initials Name Provider Type    AV Bart Nelson, LOUIE Occupational Therapist                   Obj/Interventions       [facility] Name 08/27/24 1301          Sensory Assessment (Somatosensory)    Sensory Assessment (Somatosensory) UE sensation intact  -AV       [facility] Name 08/27/24 1301          Vision Assessment/Intervention    Visual Impairment/Limitations WFL;corrective lenses for reading  -AV       [facility] Name 08/27/24 1301          Range of Motion Comprehensive    General Range of Motion bilateral upper extremity ROM WFL  -AV     Comment, General Range of Motion AROM  -AV       Row Name 08/27/24 1301          Strength Comprehensive (MMT)    Comment,  General Manual Muscle Testing (MMT) Assessment 4+/5 bilateral biceps, triceps and   -AV       Row Name 08/27/24 1301          Motor Skills    Motor Skills coordination;functional endurance  -AV     Coordination WFL  right dominant  -AV     Functional Endurance fair minus  -AV       Row Name 08/27/24 1310 08/27/24 1301       Balance    Balance Assessment sitting dynamic balance;sit to stand dynamic balance;standing dynamic balance  -AV --    Dynamic Sitting Balance independent  -AV --    Dynamic Standing Balance contact guard;verbal cues  -AV --    Position/Device Used, Standing Balance walker, rolling  -AV --    Balance Interventions standing;sit to stand;supported;minimal challenge;occupation based/functional task  -AV --    Comment, Balance -- CGA/RW  -AV              User Key  (r) = Recorded By, (t) = Taken By, (c) = Cosigned By      Initials Name Provider Type    AV Bart Nelson OT Occupational Therapist                   Goals/Plan       Row Name 08/27/24 1303          Transfer Goal 1 (OT)    Activity/Assistive Device (Transfer Goal 1, OT) transfers, all;walker, rolling  -AV     [place] Level/Cues Needed (Transfer Goal 1, OT) modified independence  -AV     Time Frame (Transfer Goal 1, OT) long term goal (LTG);10 days  -AV       Row Name 08/27/24 1303          Bathing Goal 1 (OT)    Activity/Device (Bathing Goal 1, OT) bathing skills, all;tub bench  -AV     [place] Level/Cues Needed (Bathing Goal 1, OT) modified independence  -AV     Time Frame (Bathing Goal 1, OT) long term goal (LTG);10 days  -AV       Row Name 08/27/24 1303          Dressing Goal 1 (OT)    Activity/Device (Dressing Goal 1, OT) dressing skills, all  -AV     [place]/Cues Needed (Dressing Goal 1, OT) modified independence  -AV     Time Frame (Dressing Goal 1, OT) long term goal (LTG);10 days  -AV       Row Name 08/27/24 1303          Toileting Goal 1 (OT)    Activity/Device (Toileting Goal 1, OT) toileting skills,  all;raised toilet seat  -AV     [place] Level/Cues Needed (Toileting Goal 1, OT) modified independence  -AV     Time Frame (Toileting Goal 1, OT) long term goal (LTG);10 days  -AV       Row Name 08/27/24 1303          Grooming Goal 1 (OT)    Activity/Device (Grooming Goal 1, OT) grooming skills, all  Standing at sink  -AV     [place] (Grooming Goal 1, OT) modified independence  -AV     Time Frame (Grooming Goal 1, OT) long term goal (LTG);10 days  -AV       Row Name 08/27/24 1303          Problem Specific Goal 1 (OT)    Problem Specific Goal 1 (OT) Patient will demonstrate good standing endurance/ activity tolerance needed to support ADLs.  -AV     Time Frame (Problem Specific Goal 1, OT) long term goal (LTG);10 days  -AV       Row Name 08/27/24 1303          Therapy Assessment/Plan (OT)    Planned Therapy Interventions (OT) activity tolerance training;BADL retraining;functional balance retraining;occupation/activity based interventions;patient/caregiver education/training;transfer/mobility retraining  -AV               User Key  (r) = Recorded By, (t) = Taken By, (c) = Cosigned By      Initials Name Provider Type    AV Bart Nelson OT Occupational Therapist                   Clinical Impression       Row Name 08/27/24 1302          Pain Assessment    Additional Documentation Pain Scale: FACES Pre/Post-Treatment (Group)  -AV       Row Name 08/27/24 1302          Pain Scale: FACES Pre/Post-Treatment    Pain: FACES Scale, Pretreatment 2-->hurts little bit  -AV     Posttreatment Pain Rating 2-->hurts little bit  -AV     Pain Location - Side/Orientation Right  -AV     Pain Location - knee  -AV       Row Name 08/27/24 1302          Plan of Care Review    Plan of Care Reviewed With patient  -AV     Progress improving  Through ADL/transfer retraining, patient is now able to perform basic ADLs CGA/min assist with set up and cues for safe positioning and adaptive techniques.  -AV     Outcome Evaluation Patient  presents with limitations of balance and standing endurance/activity tolerance which impede her ability to perform ADL and transfers as prior.  The skills of a therapist will be required to safely and effectively implement treatment plan to restore maximal level of function.  -AV       Row Name 08/27/24 1302          Therapy Assessment/Plan (OT)    Patient/Family Therapy Goal Statement (OT) Walk around-Do what I want  -AV     Rehab Potential (OT) good, to achieve stated therapy goals  -AV     Criteria for Skilled Therapeutic Interventions Met (OT) yes;meets criteria;skilled treatment is necessary  -AV     Therapy Frequency (OT) 5 times/wk  -AV       Row Name 08/27/24 1302          Therapy Plan Review/Discharge Plan (OT)    Equipment Needs Upon Discharge (OT) walker, rolling;commode chair;tub bench  -AV     Anticipated Discharge Disposition (OT) home with assist  Outpatient PT  -AV       Row Name 08/27/24 1302          Vital Signs    O2 Delivery Pre Treatment room air  -AV     O2 Delivery Intra Treatment room air  -AV     O2 Delivery Post Treatment room air  -AV       Row Name 08/27/24 1302          Positioning and Restraints    Pre-Treatment Position sitting in chair/recliner  -AV     Post Treatment Position chair  -AV     In Chair reclined;call light within reach;encouraged to call for assist;exit alarm on  -AV               User Key  (r) = Recorded By, (t) = Taken By, (c) = Cosigned By      Initials Name Provider Type    AV Bart Nelson, LOUIE Occupational Therapist                   Outcome Measures       Row Name 08/27/24 4271          How much help from another is currently needed...    Putting on and taking off regular lower body clothing? 3  -AV     Bathing (including washing, rinsing, and drying) 3  -AV     Toileting (which includes using toilet bed pan or urinal) 3  -AV     Putting on and taking off regular upper body clothing 4  -AV     Taking care of personal grooming (such as brushing teeth) 4  -AV      Eating meals 4  -AV     AM-PAC 6 Clicks Score (OT) 21  -AV       Row Name 08/27/24 0730          How much help from another person do you currently need...    Turning from your back to your side while in flat bed without using bedrails? 3  -MH     Moving from lying on back to sitting on the side of a flat bed without bedrails? 3  -MH     Moving to and from a bed to a chair (including a wheelchair)? 3  -MH     Standing up from a chair using your arms (e.g., wheelchair, bedside chair)? 3  -MH     Climbing 3-5 steps with a railing? 3  -MH     To walk in hospital room? 3  -MH     AM-PAC 6 Clicks Score (PT) 18  -MH     Highest Level of Mobility Goal 6 --> Walk 10 steps or more  -       Row Name 08/27/24 1304          Functional Assessment    Outcome Measure Options AM-PAC 6 Clicks Daily Activity (OT);Optimal Instrument  -AV       Row Name 08/27/24 1304          Optimal Instrument    Optimal Instrument Optimal - 3  -AV     Bending/Stooping 2  -AV     Standing 2  -AV     Reaching 1  -AV     From the list, choose the 3 activities you would most like to be able to do without any difficulty Bending/stooping;Standing;Reaching  -AV     Total Score Optimal - 3 5  -AV               User Key  (r) = Recorded By, (t) = Taken By, (c) = Cosigned By      Initials Name Provider Type    Dacia Roberson, RN Registered Nurse    Bart Phelps OT Occupational Therapist                    Occupational Therapy Education       Title: PT OT SLP Therapies (Resolved)       Topic: Occupational Therapy (Resolved)       Point: ADL training (Resolved)       Description:   Instruct learner(s) on proper safety adaptation and remediation techniques during self care or transfers.   Instruct in proper use of assistive devices.                  Learning Progress Summary             Patient Acceptance, E, VU by AV at 8/27/2024 1305    Comment: WBAT  Need for staff assistance for all standing ADL/transfers  Safe transfer techniques  Safe positioning  for ADLs  Adaptive techniques for lower body ADLs  ADL home safety/ adaptive equipment recommendations                         Point: Home exercise program (Resolved)       Description:   Instruct learner(s) on appropriate technique for monitoring, assisting and/or progressing therapeutic exercises/activities.                  Learning Progress Summary             Patient Acceptance, E, VU by AV at 8/27/2024 1305    Comment: WBAT  Need for staff assistance for all standing ADL/transfers  Safe transfer techniques  Safe positioning for ADLs  Adaptive techniques for lower body ADLs  ADL home safety/ adaptive equipment recommendations                         Point: Precautions (Resolved)       Description:   Instruct learner(s) on prescribed precautions during self-care and functional transfers.                  Learning Progress Summary             Patient Acceptance, E, VU by AV at 8/27/2024 1305    Comment: WBAT  Need for staff assistance for all standing ADL/transfers  Safe transfer techniques  Safe positioning for ADLs  Adaptive techniques for lower body ADLs  ADL home safety/ adaptive equipment recommendations                         Point: Body mechanics (Resolved)       Description:   Instruct learner(s) on proper positioning and spine alignment during self-care, functional mobility activities and/or exercises.                  Learning Progress Summary             Patient Acceptance, E, VU by AV at 8/27/2024 1305    Comment: WBAT  Need for staff assistance for all standing ADL/transfers  Safe transfer techniques  Safe positioning for ADLs  Adaptive techniques for lower body ADLs  ADL home safety/ adaptive equipment recommendations                                         User Key       Initials Effective Dates Name Provider Type Discipline    ANANTH 06/16/21 -  Bart Nelson OT Occupational Therapist OT                  OT Recommendation and Plan  Planned Therapy Interventions (OT): activity tolerance training,  BADL retraining, functional balance retraining, occupation/activity based interventions, patient/caregiver education/training, transfer/mobility retraining  Therapy Frequency (OT): 5 times/wk  Plan of Care Review  Plan of Care Reviewed With: patient  Progress: improving (Through ADL/transfer retraining, patient is now able to perform basic ADLs CGA/min assist with set up and cues for safe positioning and adaptive techniques.)  Outcome Evaluation: Patient presents with limitations of balance and standing endurance/activity tolerance which impede her ability to perform ADL and transfers as prior.  The skills of a therapist will be required to safely and effectively implement treatment plan to restore maximal level of function.     Time Calculation:   Evaluation Complexity (OT)  Review Occupational Profile/Medical/Therapy History Complexity: expanded/moderate complexity  Assessment, Occupational Performance/Identification of Deficit Complexity: 1-3 performance deficits  Clinical Decision Making Complexity (OT): problem focused assessment/low complexity  Overall Complexity of Evaluation (OT): low complexity     Time Calculation- OT       Row Name 08/27/24 1306             Time Calculation- OT    OT Received On 08/27/24  -AV      OT Goal Re-Cert Due Date 09/05/24  -AV         Timed Charges    08358 - OT Self Care/Mgmt Minutes 30  -AV         Untimed Charges    OT Eval/Re-eval Minutes 32  -AV         Total Minutes    Timed Charges Total Minutes 30  -AV      Untimed Charges Total Minutes 32  -AV       Total Minutes 62  -AV                User Key  (r) = Recorded By, (t) = Taken By, (c) = Cosigned By      Initials Name Provider Type    AV Bart Nelson OT Occupational Therapist                  Therapy Charges for Today       Code Description Service Date Service Provider Modifiers Qty    [id number]  OT SELF CARE/MGMT/TRAIN EA 15 MIN 8/27/2024 Bart Nelson OT GO 2    [id number]  OT EVAL LOW COMPLEXITY 3 8/27/2024  Bart Nelson, OT GO 1                 Bart Nelson, OT  8/27/2024

## 2024-08-27 NOTE — THERAPY TREATMENT NOTE
Acute Care - Physical Therapy Treatment Note  [facility]     Patient Name: Katie Rudd  : 1950  MRN: [id number]  Today's Date: 2024      Visit Dx:     ICD-10-CM ICD-9-CM   1. Primary osteoarthritis of right knee  M17.11 715.16   2. Difficulty in walking  R26.2 719.7   3. Decreased activities of daily living (ADL)  Z78.9 V49.89     Patient Active Problem List   Diagnosis    Primary osteoarthritis of right knee    S/P TKR (total knee replacement)     Past Medical History:   Diagnosis Date    Anxiety     Bladder dysfunction     Hyperlipidemia     Hypertension     PONV (postoperative nausea and vomiting)      Past Surgical History:   Procedure Laterality Date    BREAST BIOPSY Left     CATARACT EXTRACTION      COLONOSCOPY N/A 2024    Procedure: COLONOSCOPY WITH COLD/HOT SNARE AND BIOPSY;  Surgeon: Giorgio Garcia MD;  Location: [facility] ENDOSCOPY;  Service: General;  Laterality: N/A;  DIVERTICULOSIS, COLON POLYPS, ANAL MUCOSA ABNORMALITY    HYSTERECTOMY      NECK SURGERY      TONSILLECTOMY      TOTAL KNEE ARTHROPLASTY Right 2024    Procedure: RIGHT TOTAL KNEE ARTHROPLASTY.;  Surgeon: Glen Platt MD;  Location: [facility] MAIN OR;  Service: Orthopedics;  Laterality: Right;    TUBAL ABDOMINAL LIGATION       PT Assessment (Last 12 Hours)       PT Evaluation and Treatment       Row Name 24 1422          Physical Therapy Time and Intention    Subjective Information complains of;pain  -     Document Type therapy note (daily note)  -     Mode of Treatment individual therapy;group therapy;physical therapy  -     Patient Effort good  -     Comment Gait training performed individually; therapeutic exercises performed in a group setting with 5 participants  -       Row Name 24 1422          General Information    Patient Observations alert;cooperative;agree to therapy  -       Row Name 24 1422          Pain Scale: FACES Pre/Post-Treatment    Pain: FACES Scale,  Pretreatment 0-->no hurt  -RH     Posttreatment Pain Rating 2-->hurts little bit  -RH     Pain Location - Side/Orientation Right  -RH     Pain Location - knee  -       Row Name 08/27/24 1422          Range of Motion (ROM)    Range of Motion --  Pt R knee AAROM at 90 degrees flex and 9 degrees ext.  -       Row Name 08/27/24 1422          Strength (Manual Muscle Testing)    Strength (Manual Muscle Testing) --  Pt R knee ext strength at 3-/5.  -       Row Name 08/27/24 1422          Mobility    Extremity Weight-bearing Status right lower extremity  -RH     Right Lower Extremity (Weight-bearing Status) weight-bearing as tolerated (WBAT)  -       Row Name 08/27/24 1422          Transfers    Transfers sit-stand transfer;stand-sit transfer  -       Row Name 08/27/24 1422          Sit-Stand Transfer    Sit-Stand [place] (Transfers) contact guard  -     Assistive Device (Sit-Stand Transfers) walker, front-wheeled  -[facility] Name 08/27/24 1422          Stand-Sit Transfer    Stand-Sit [place] (Transfers) contact guard  -     Assistive Device (Stand-Sit Transfers) walker, front-wheeled  -[facility] Name 08/27/24 1422          Gait/Stairs (Locomotion)    Gait/Stairs Locomotion gait/ambulation assistive device  -RH     [place] Level (Gait) contact guard  -     Assistive Device (Gait) walker, front-wheeled  -     Patient was able to Ambulate yes  -RH     Distance in Feet (Gait) 125  -RH     Pattern (Gait) 3-point;step-through  -RH     Deviations/Abnormal Patterns (Gait) antalgic;gait speed decreased;stride length decreased  -RH     Right Sided Gait Deviations heel strike decreased  -RH     Gait Assessment/Intervention Pt amb with RW and CGA with 3 point step-through gait pattern with decreased gait speed, stride length, and RLE heel strike.  -       Row Name 08/27/24 1422          Safety Issues, Functional Mobility    Impairments Affecting Function (Mobility) balance;pain;range of motion  (ROM);strength  -       Row Name 08/27/24 1422          Balance    Balance Assessment standing dynamic balance  -     Dynamic Standing Balance contact guard  -     Position/Device Used, Standing Balance walker, front-wheeled  -       Row Name 08/27/24 1422          Motor Skills    Therapeutic Exercise knee;hip;ankle  -       Row Name 08/27/24 1422          Hip (Therapeutic Exercise)    Hip (Therapeutic Exercise) isometric exercises  -     Hip Isometrics (Therapeutic Exercise) right;gluteal sets;10 repetitions;2 sets  -       Row Name 08/27/24 1422          Knee (Therapeutic Exercise)    Knee (Therapeutic Exercise) isometric exercises;strengthening exercise  -     Knee Isometrics (Therapeutic Exercise) right;quad sets;10 repetitions;2 sets  -     Knee Strengthening (Therapeutic Exercise) right;heel slides;SLR (straight leg raise);SAQ (short arc quad);LAQ (long arc quad);10 repetitions;2 sets  -       Row Name 08/27/24 1422          Ankle (Therapeutic Exercise)    Ankle (Therapeutic Exercise) AROM (active range of motion)  -     Ankle AROM (Therapeutic Exercise) right;dorsiflexion;plantarflexion;10 repetitions;2 sets  -       Row Name             Wound 08/26/24 0740 Right anterior knee Incision    Wound - Properties Group Placement Date: 08/26/24  -BW Placement Time: 0740  -BW Present on Original Admission: N  -BW Side: Right  -BW Orientation: anterior  -BW Location: knee  -BW Primary Wound Type: Incision  -BW    Retired Wound - Properties Group Placement Date: 08/26/24  -BW Placement Time: 0740  -BW Present on Original Admission: N  -BW Side: Right  -BW Orientation: anterior  -BW Location: knee  -BW Primary Wound Type: Incision  -BW    Retired Wound - Properties Group Date first assessed: 08/26/24  -BW Time first assessed: 0740  -BW Present on Original Admission: N  -BW Side: Right  -BW Location: knee  -BW Primary Wound Type: Incision  -BW      Row Name 08/27/24 1422          Positioning and  Restraints    In Chair call light within reach;reclined;encouraged to call for assist;exit alarm on;with family/caregiver  -       Row Name 08/27/24 1422          Progress Summary (PT)    Progress Toward Functional Goals (PT) progress toward functional goals is good  -     Daily Progress Summary (PT) Pt is progressing well with their exercise program.  Will continue current plan of care.  -               User Key  (r) = Recorded By, (t) = Taken By, (c) = Cosigned By      Initials Name Provider Type     Vaughn Chacko, RN Registered Nurse     El Lema PTA Physical Therapist Assistant                    Physical Therapy Education       Title: PT OT SLP Therapies (Resolved)       Topic: Physical Therapy (Resolved)       Point: Mobility training (Resolved)       Learning Progress Summary             Patient Acceptance, E,TB, VU by  at 8/26/2024 1153                         Point: Precautions (Resolved)       Learning Progress Summary             Patient Acceptance, E,TB, VU by CHRIS at 8/26/2024 1153                                         User Key       Initials Effective Dates Name Provider Type Discipline     06/03/21 -  Michael Mckenzie, PT Physical Therapist PT                  PT Recommendation and Plan     Progress Summary (PT)  Progress Toward Functional Goals (PT): progress toward functional goals is good  Daily Progress Summary (PT): Pt is progressing well with their exercise program.  Will continue current plan of care.   Outcome Measures       Row Name 08/27/24 1400 08/26/24 1500 08/26/24 1100       How much help from another person do you currently need...    Turning from your back to your side while in flat bed without using bedrails? 3  - 3  -CHRIS 3  -CHRIS    Moving from lying on back to sitting on the side of a flat bed without bedrails? 3  -RH 3  -CHRIS 3  -CHRIS    Moving to and from a bed to a chair (including a wheelchair)? 3  -RH 3  -CHRIS 3  -CHRIS    Standing up from a chair using your arms  (e.g., wheelchair, bedside chair)? 3  -RH 3  -CHRIS 3  -CHRIS    Climbing 3-5 steps with a railing? 4  -RH 3  -CHRIS 3  -CHRIS    To walk in hospital room? 4  -RH 3  -CHRIS 3  -CHRIS    AM-PAC 6 Clicks Score (PT) 20  -RH 18  -CHRIS 18  -CHRIS    Highest Level of Mobility Goal 6 --> Walk 10 steps or more  -RH 6 --> Walk 10 steps or more  -CHRIS 6 --> Walk 10 steps or more  -CHRIS       Functional Assessment    Outcome Measure Options -- AM-PAC 6 Clicks Basic Mobility (PT)  -CHRIS AM-PAC 6 Clicks Basic Mobility (PT)  -CHRIS              User Key  (r) = Recorded By, (t) = Taken By, (c) = Cosigned By      Initials Name Provider Type     El Lema PTA Physical Therapist Assistant    Michael Zhou, PT Physical Therapist                     Time Calculation:    PT Charges       Row Name 08/27/24 1421             Time Calculation    PT Received On 08/27/24  -RH         Timed Charges    50899 - Gait Training Minutes  9  -RH      25227 - PT Therapeutic Activity Minutes 5  -RH         Untimed Charges    PT Group Therapy Minutes 35  -RH         Total Minutes    Timed Charges Total Minutes 14  -RH      Untimed Charges Total Minutes 35  -RH       Total Minutes 49  -RH                User Key  (r) = Recorded By, (t) = Taken By, (c) = Cosigned By      Initials Name Provider Type     El Lema PTA Physical Therapist Assistant                  Therapy Charges for Today       Code Description Service Date Service Provider Modifiers Qty    49605090466 HC GAIT TRAINING EA 15 MIN 8/27/2024 El Lema PTA GP 1    23902450920 HC PT THER PROC GROUP 8/27/2024 El Lema PTA GP 1            PT G-Codes  Outcome Measure Options: AM-PAC 6 Clicks Daily Activity (OT), Optimal Instrument  AM-PAC 6 Clicks Score (PT): 20  AM-PAC 6 Clicks Score (OT): 21    El Lema PTA  8/27/2024

## 2024-08-27 NOTE — PLAN OF CARE
Goal Outcome Evaluation:  Plan of Care Reviewed With: patient        Progress: improving  Outcome Evaluation: Pt A&Ox4, VSS. Pt ambulated 80 ft with gait belt, walker, and assist x 1. Pt's pain has been managed with scheduled and prn pain medications. Pt expects to d/c home with outpt therapy today.

## 2024-08-28 NOTE — THERAPY DISCHARGE NOTE
Acute Care - Occupational Therapy Discharge   [facility]    Patient Name: Katie Rudd  : 1950    MRN: [id number]                              Today's Date: 2024       Admit Date: 2024    Visit Dx:     ICD-10-CM ICD-9-CM   1. Primary osteoarthritis of right knee  M17.11 715.16   2. Difficulty in walking  R26.2 719.7   3. Decreased activities of daily living (ADL)  Z78.9 V49.89     Patient Active Problem List   Diagnosis    Primary osteoarthritis of right knee    S/P TKR (total knee replacement)     Past Medical History:   Diagnosis Date    Anxiety     Bladder dysfunction     Hyperlipidemia     Hypertension     PONV (postoperative nausea and vomiting)      Past Surgical History:   Procedure Laterality Date    BREAST BIOPSY Left     CATARACT EXTRACTION      COLONOSCOPY N/A 2024    Procedure: COLONOSCOPY WITH COLD/HOT SNARE AND BIOPSY;  Surgeon: Giorgio Garcia MD;  Location: [facility] ENDOSCOPY;  Service: General;  Laterality: N/A;  DIVERTICULOSIS, COLON POLYPS, ANAL MUCOSA ABNORMALITY    HYSTERECTOMY      NECK SURGERY      TONSILLECTOMY      TOTAL KNEE ARTHROPLASTY Right 2024    Procedure: RIGHT TOTAL KNEE ARTHROPLASTY.;  Surgeon: Glen Platt MD;  Location: [facility] MAIN OR;  Service: Orthopedics;  Laterality: Right;    TUBAL ABDOMINAL LIGATION        General Information    No documentation.                  Mobility/ADL's    No documentation.                  Obj/Interventions    No documentation.                  Goals/Plan       Row Name 24 0951          Transfer Goal 1 (OT)    Activity/Assistive Device (Transfer Goal 1, OT) transfers, all;walker, rolling  -AV     [place] Level/Cues Needed (Transfer Goal 1, OT) modified independence  -AV     Time Frame (Transfer Goal 1, OT) long term goal (LTG);10 days  -AV     Progress/Outcome (Transfer Goal 1, OT) good progress toward goal;discharged from facility  -AV       Row Name 24 0951          Bathing Goal 1 (OT)     Activity/Device (Bathing Goal 1, OT) bathing skills, all;tub bench  -AV     [place] Level/Cues Needed (Bathing Goal 1, OT) modified independence  -AV     Time Frame (Bathing Goal 1, OT) long term goal (LTG);10 days  -AV     Progress/Outcomes (Bathing Goal 1, OT) good progress toward goal;discharged from [facility]  -       Row Name 08/28/24 0951          Dressing Goal 1 (OT)    Activity/Device (Dressing Goal 1, OT) dressing skills, all  -AV     [place]/Cues Needed (Dressing Goal 1, OT) modified independence  -AV     Time Frame (Dressing Goal 1, OT) long term goal (LTG);10 days  -AV     Progress/Outcome (Dressing Goal 1, OT) good progress toward goal;discharged from [facility]       Row Name 08/28/24 0951          Toileting Goal 1 (OT)    Activity/Device (Toileting Goal 1, OT) toileting skills, all;raised toilet seat  -AV     [place] Level/Cues Needed (Toileting Goal 1, OT) modified independence  -AV     Time Frame (Toileting Goal 1, OT) long term goal (LTG);10 days  -AV     Progress/Outcome (Toileting Goal 1, OT) good progress toward goal;discharged from [facility]  -       Row Name 08/28/24 0951          Grooming Goal 1 (OT)    Activity/Device (Grooming Goal 1, OT) grooming skills, all  -AV     [place] (Grooming Goal 1, OT) modified independence  -AV     Time Frame (Grooming Goal 1, OT) long term goal (LTG);10 days  -AV     Progress/Outcome (Grooming Goal 1, OT) good progress toward goal;discharged from [facility]  -       Row Name 08/28/24 0951          Problem Specific Goal 1 (OT)    Problem Specific Goal 1 (OT) Patient will demonstrate good standing endurance/ activity tolerance needed to support ADLs.  -AV     Time Frame (Problem Specific Goal 1, OT) long term goal (LTG);10 days  -AV     Progress/Outcome (Problem Specific Goal 1, OT) good progress toward goal;discharged from [facility]  -               User Key  (r) = Recorded By, (t) = Taken By, (c) = Cosigned By      Initials Name  Provider Type    Bart Phelps OT Occupational Therapist                   Clinical Impression    No documentation.                  Outcome Measures    No documentation.                 Occupational Therapy Education       Title: PT OT SLP Therapies (Resolved)       Topic: Occupational Therapy (Resolved)       Point: ADL training (Resolved)       Description:   Instruct learner(s) on proper safety adaptation and remediation techniques during self care or transfers.   Instruct in proper use of assistive devices.                  Learning Progress Summary             Patient Acceptance, E, VU by AV at 8/27/2024 1305    Comment: WBAT  Need for staff assistance for all standing ADL/transfers  Safe transfer techniques  Safe positioning for ADLs  Adaptive techniques for lower body ADLs  ADL home safety/ adaptive equipment recommendations                         Point: Home exercise program (Resolved)       Description:   Instruct learner(s) on appropriate technique for monitoring, assisting and/or progressing therapeutic exercises/activities.                  Learning Progress Summary             Patient Acceptance, E, VU by AV at 8/27/2024 1305    Comment: WBAT  Need for staff assistance for all standing ADL/transfers  Safe transfer techniques  Safe positioning for ADLs  Adaptive techniques for lower body ADLs  ADL home safety/ adaptive equipment recommendations                         Point: Precautions (Resolved)       Description:   Instruct learner(s) on prescribed precautions during self-care and functional transfers.                  Learning Progress Summary             Patient Acceptance, E, VU by AV at 8/27/2024 1305    Comment: WBAT  Need for staff assistance for all standing ADL/transfers  Safe transfer techniques  Safe positioning for ADLs  Adaptive techniques for lower body ADLs  ADL home safety/ adaptive equipment recommendations                         Point: Body mechanics (Resolved)        Description:   Instruct learner(s) on proper positioning and spine alignment during self-care, functional mobility activities and/or exercises.                  Learning Progress Summary             Patient Acceptance, E, VU by ANANTH at 8/27/2024 7516    Comment: WBAT  Need for staff assistance for all standing ADL/transfers  Safe transfer techniques  Safe positioning for ADLs  Adaptive techniques for lower body ADLs  ADL home safety/ adaptive equipment recommendations                                         User Key       Initials Effective Dates Name Provider Type Discipline    ANANTH 06/16/21 -  Bart Nelson, OT Occupational Therapist OT                  OT Recommendation and Plan  Planned Therapy Interventions (OT): activity tolerance training, BADL retraining, functional balance retraining, occupation/activity based interventions, patient/caregiver education/training, transfer/mobility retraining  Therapy Frequency (OT): 5 times/wk  Plan of Care Review  Plan of Care Reviewed With: patient  Progress: improving (Through ADL/transfer retraining, patient is now able to perform basic ADLs CGA/min assist with set up and cues for safe positioning and adaptive techniques.)  Outcome Evaluation: Patient presents with limitations of balance and standing endurance/activity tolerance which impede her ability to perform ADL and transfers as prior.  The skills of a therapist will be required to safely and effectively implement treatment plan to restore maximal level of function.  Plan of Care Reviewed With: patient  Outcome Evaluation: Patient presents with limitations of balance and standing endurance/activity tolerance which impede her ability to perform ADL and transfers as prior.  The skills of a therapist will be required to safely and effectively implement treatment plan to restore maximal level of function.     Time Calculation:   Evaluation Complexity (OT)  Review Occupational Profile/Medical/Therapy History Complexity:  expanded/moderate complexity  Assessment, Occupational Performance/Identification of Deficit Complexity: 1-3 performance deficits  Clinical Decision Making Complexity (OT): problem focused assessment/low complexity  Overall Complexity of Evaluation (OT): low complexity      Therapy Charges for Today       Code Description Service Date Service Provider Modifiers Qty    56957983363  OT SELF CARE/MGMT/TRAIN EA 15 MIN 8/27/2024 Bart Nelson OT GO 2    00272866688 HC OT EVAL LOW COMPLEXITY 3 8/27/2024 Bart Nelson OT GO 1               OT Discharge Summary  Anticipated Discharge Disposition (OT): home with assist (Outpatient PT)  Outcomes Achieved: Discharge from facility occurred on same date as evluation    Bart Nelson OT  8/28/2024

## 2024-09-10 ENCOUNTER — OFFICE VISIT (OUTPATIENT)
Dept: ORTHOPEDIC SURGERY | Facility: CLINIC | Age: 74
End: 2024-09-10
Payer: MEDICARE

## 2024-09-10 VITALS
HEART RATE: 85 BPM | WEIGHT: 194 LBS | BODY MASS INDEX: 31.18 KG/M2 | DIASTOLIC BLOOD PRESSURE: 49 MMHG | OXYGEN SATURATION: 95 % | HEIGHT: 66 IN | SYSTOLIC BLOOD PRESSURE: 118 MMHG

## 2024-09-10 DIAGNOSIS — Z96.651 S/P TOTAL KNEE ARTHROPLASTY, RIGHT: ICD-10-CM

## 2024-09-10 DIAGNOSIS — Z47.1 AFTERCARE FOLLOWING RIGHT KNEE JOINT REPLACEMENT SURGERY: ICD-10-CM

## 2024-09-10 DIAGNOSIS — M25.561 RIGHT KNEE PAIN, UNSPECIFIED CHRONICITY: Primary | ICD-10-CM

## 2024-09-10 DIAGNOSIS — Z96.651 AFTERCARE FOLLOWING RIGHT KNEE JOINT REPLACEMENT SURGERY: ICD-10-CM

## 2024-09-10 DIAGNOSIS — M17.11 PRIMARY OSTEOARTHRITIS OF RIGHT KNEE: ICD-10-CM

## 2024-09-10 PROCEDURE — 1160F RVW MEDS BY RX/DR IN RCRD: CPT

## 2024-09-10 PROCEDURE — 99024 POSTOP FOLLOW-UP VISIT: CPT

## 2024-09-10 PROCEDURE — 1159F MED LIST DOCD IN RCRD: CPT

## 2024-09-10 RX ORDER — DICLOFENAC SODIUM 75 MG/1
TABLET, DELAYED RELEASE ORAL
COMMUNITY
Start: 2024-09-07

## 2024-09-10 RX ORDER — HYDROCODONE BITARTRATE AND ACETAMINOPHEN 7.5; 325 MG/1; MG/1
1 TABLET ORAL EVERY 4 HOURS PRN
Qty: 42 TABLET | Refills: 0 | Status: SHIPPED | OUTPATIENT
Start: 2024-09-10

## 2024-09-10 NOTE — PROGRESS NOTES
"Chief Complaint  Pain and Follow-up of the Right Knee and Suture / Staple Removal    Subjective      Katie Rudd presents to [facility] for follow up of her right knee.  Patient is 2 weeks status post right total knee arthroplasty performed by Dr. Platt on 8/26/2024.  Patient arrives today with use of a walker.  Overall she states she is doing very well.  She states she has been attending physical therapy outpatient in [place].  She states she is icing frequently.  She has not required pain medication frequently, but does take it at night.  Overall she is very pleased with her progress.    No Known Allergies    Objective     Vital Signs:   Vitals:    09/10/24 1317   BP: 118/49   Pulse: 85   SpO2: 95%   Weight: 88 kg (194 lb 0.1 oz)   Height: 167.6 cm (66\")     Body mass index is 31.31 kg/m².    I reviewed the patient's chief complaint, history of present illness, review of systems, past medical history, surgical history, family history, social history, medications, and allergy list.     REVIEW OF SYSTEMS    Constitutional: Denies fevers, chills, weight loss  Cardiovascular: Denies chest pain, shortness of breath  Skin: Denies rashes, acute skin changes  Neurologic: Denies headache, loss of consciousness  MSK: Right knee pain.     Ortho Exam  Knee   General: Alert, no acute distress.   Right knee: No pain with passive hip ROM.  Staples removed today in office without complication. Steri-strips applied, incision is clean, dry and intact, without swelling, redness or drainage.  Knee stable to varus/valgus stress.  Knee extensor mechanism intact.  -5 degrees knee extension. Flexion to 90 degrees. Calf soft, non-tender.  Sensation and neurovascularly intact.  Demonstrates active ankle dorsiflexion and plantarflexion.  Palpable pedal pulses.               Imaging Results (Most Recent)       Procedure Component Value Units Date/Time    XR Knee 3 View Right [[id number]] Resulted: " 09/10/24 1341     Updated: 09/10/24 1341    Narrative:      X-Ray Report:  Study: X-rays ordered, taken in the office, and reviewed today.   Site: Right knee Xray  Indication: Right total knee arthroplasty follow up.   View: AP/Lateral, Standing, and [place] view(s)  Findings: Intact right total knee arthroplasty. No evidence of hardware   malfunction, complication or loosening. No periprosthetic fractures   visualized. Patella is midline tracking on sunrise view.   Prior studies available for comparison: yes                    Assessment and Plan   Diagnoses and all orders for this visit:    1. Right knee pain, unspecified chronicity (Primary)  -     XR Knee 3 View Right    2. Aftercare following right knee joint replacement surgery    3. S/P total knee arthroplasty, right         Katie Rudd presents today 2 weeks post op right total knee arthroplasty performed by Dr. Platt on 8/26/2024. X-rays were reviewed with the patient today. Sutures/staples removed today without complications. Incisions are well healing without active drainage or concerns for infection. Incision site care was reviewed today. Patient instructed not to soak or submerge incisions. Do not apply any lotions, creams, or ointments to the incisions at this time.      Patient instructed to continue with formal physical therapy and the use of cane/walker until recommended by PT to discontinue. We discussed the importance of home exercises in addition to PT. We discussed swelling management with continued icing of the knee for approximately 15-20 minutes, three times daily, and as needed.     We discussed the importance of weaning from narcotic medications. Patient expressed understanding.  Patient will continue ASA for DVT prophylaxis until prescription completed.      Patient will follow-up in 4 weeks for reevaluation.  We do not need xrays at this visit.     Call or return if symptoms worsen or patient has any concerns.            Tobacco  Use: Low Risk  (9/10/2024)    Patient History     Smoking Tobacco Use: Never     Smokeless Tobacco Use: Never     Passive Exposure: Not on file     Patient reports that they are a nonsmoker; cessation education not applicable.            Follow Up   Return in about 1 month (around 10/10/2024).  There are no Patient Instructions on file for this visit.  Patient was given instructions and counseling regarding her condition or for health maintenance advice. Please see specific information pulled into the AVS if appropriate.       Dictated Utilizing Dragon Dictation. Please note that portions of this note were completed with a voice recognition program. Part of this note may be an electronic transcription/translation of spoken language to printed text using the Dragon Dictation System.

## 2024-09-25 ENCOUNTER — TRANSCRIBE ORDERS (OUTPATIENT)
Dept: ADMINISTRATIVE | Facility: HOSPITAL | Age: 74
End: 2024-09-25
Payer: MEDICARE

## 2024-09-25 DIAGNOSIS — Z78.0 POSTMENOPAUSAL: ICD-10-CM

## 2024-09-25 DIAGNOSIS — Z12.31 SCREENING MAMMOGRAM FOR BREAST CANCER: Primary | ICD-10-CM

## 2024-10-08 ENCOUNTER — OFFICE VISIT (OUTPATIENT)
Dept: ORTHOPEDIC SURGERY | Facility: CLINIC | Age: 74
End: 2024-10-08
Payer: MEDICARE

## 2024-10-08 VITALS
WEIGHT: 193 LBS | DIASTOLIC BLOOD PRESSURE: 79 MMHG | HEIGHT: 66 IN | HEART RATE: 78 BPM | SYSTOLIC BLOOD PRESSURE: 144 MMHG | OXYGEN SATURATION: 96 % | BODY MASS INDEX: 31.02 KG/M2

## 2024-10-08 DIAGNOSIS — Z47.1 AFTERCARE FOLLOWING RIGHT KNEE JOINT REPLACEMENT SURGERY: ICD-10-CM

## 2024-10-08 DIAGNOSIS — Z96.651 S/P TOTAL KNEE ARTHROPLASTY, RIGHT: Primary | ICD-10-CM

## 2024-10-08 DIAGNOSIS — Z96.651 AFTERCARE FOLLOWING RIGHT KNEE JOINT REPLACEMENT SURGERY: ICD-10-CM

## 2024-10-08 DIAGNOSIS — M25.561 RIGHT KNEE PAIN, UNSPECIFIED CHRONICITY: ICD-10-CM

## 2024-10-08 PROCEDURE — 1160F RVW MEDS BY RX/DR IN RCRD: CPT

## 2024-10-08 PROCEDURE — 1159F MED LIST DOCD IN RCRD: CPT

## 2024-10-08 PROCEDURE — 99024 POSTOP FOLLOW-UP VISIT: CPT

## 2024-10-08 RX ORDER — HYDROCODONE BITARTRATE AND ACETAMINOPHEN 5; 325 MG/1; MG/1
1 TABLET ORAL EVERY 6 HOURS PRN
Qty: 21 TABLET | Refills: 0 | Status: SHIPPED | OUTPATIENT
Start: 2024-10-08

## 2024-10-08 NOTE — PROGRESS NOTES
"Chief Complaint  Follow-up and Pain of the Right Knee    Subjective      Katie Rudd presents to [facility] for follow up of right knee.  Patient is 6 weeks status post right total knee arthroplasty performed Dr. Platt on 8/26/24.  Patient arrives today with use of walker.  Patient brought her walker today stating that the only reason she is using is due to severe low back pain that started on Sunday.  She denies any falls or injuries.  She states that is difficult to perform physical therapy due to the severe back pain.  She is attending physical therapy at [facility] in [place] and states that they are able to get her knee completely straight.  She states that she is still icing as well.    No Known Allergies    Objective     Vital Signs:   Vitals:    10/08/24 1249   BP: 144/79   Pulse: 78   SpO2: 96%   Weight: 87.5 kg (193 lb)   Height: 167.6 cm (66\")     Body mass index is 31.15 kg/m².    I reviewed the patient's chief complaint, history of present illness, review of systems, past medical history, surgical history, family history, social history, medications, and allergy list.     REVIEW OF SYSTEMS    Constitutional: Denies fevers, chills, weight loss  Cardiovascular: Denies chest pain, shortness of breath  Skin: Denies rashes, acute skin changes  Neurologic: Denies headache, loss of consciousness  MSK: Right knee pain.     Ortho Exam  Knee   General: Alert, no acute distress.   Right knee: No pain with passive hip ROM.  Well-healing incision.  Knee stable to varus/valgus stress.  Knee extensor mechanism intact.  -3 degrees knee extension. Flexion to 105 degrees. Calf soft, non-tender.  Sensation and neurovascularly intact.  Demonstrates active ankle dorsiflexion and plantarflexion.  Palpable pedal pulses.               Imaging Results (Most Recent)       None                Assessment and Plan   Diagnoses and all orders for this visit:    1. S/P total knee arthroplasty, " right (Primary)  -     Ambulatory Referral to Physical Therapy for Evaluation & Treatment    2. Right knee pain, unspecified chronicity  -     Ambulatory Referral to Physical Therapy for Evaluation & Treatment    3. Aftercare following right knee joint replacement surgery  -     Ambulatory Referral to Physical Therapy for Evaluation & Treatment         Katie Rudd presents today 6 weeks post op right total knee arthroplasty performed by Dr. Platt on 8/26/2024.  Incision is well healing without active drainage or redness noted. No concerning signs of infection. We discussed concerning signs and symptoms regarding the incision sites.  Patient understood and agreed. Patient instructed to continue PT as well as home exercises.     Patient was advised to contact her primary care provider in regards to her severe low back pain.  Also discussed possibility of urgent care for evaluation.  Strongly encouraged patient to continue to attend physical therapy for her knee.  Did discuss that she is not completely straight with her knee range of motion today in office and concern for her to go backwards in her progress if she would to stop physical therapy at this time.  Patient verbalized understanding.    Patient will follow-up in 6 weeks for reevaluation.  We will obtain new x-rays of the right knee at next visit.    Call or return if symptoms worsen or patient has any concerns.        Tobacco Use: Low Risk  (10/8/2024)    Patient History     Smoking Tobacco Use: Never     Smokeless Tobacco Use: Never     Passive Exposure: Not on file     Patient reports that they are a nonsmoker; cessation education not applicable.            Follow Up   Return in about 6 weeks (around 11/19/2024).  There are no Patient Instructions on file for this visit.  Patient was given instructions and counseling regarding her condition or for health maintenance advice. Please see specific information pulled into the AVS if appropriate.        Dictated Utilizing Dragon Dictation. Please note that portions of this note were completed with a voice recognition program. Part of this note may be an electronic transcription/translation of spoken language to printed text using the Dragon Dictation System.

## 2024-11-04 ENCOUNTER — HOSPITAL ENCOUNTER (OUTPATIENT)
Dept: MAMMOGRAPHY | Facility: HOSPITAL | Age: 74
Discharge: HOME | End: 2024-11-04
Payer: MEDICARE

## 2024-11-04 ENCOUNTER — HOSPITAL ENCOUNTER (OUTPATIENT)
Dept: BONE DENSITY | Facility: HOSPITAL | Age: 74
Discharge: HOME | End: 2024-11-04
Payer: MEDICARE

## 2024-11-04 DIAGNOSIS — Z78.0 POSTMENOPAUSAL: ICD-10-CM

## 2024-11-04 DIAGNOSIS — Z12.31 SCREENING MAMMOGRAM FOR BREAST CANCER: ICD-10-CM

## 2024-11-04 PROCEDURE — 77067 SCR MAMMO BI INCL CAD: CPT

## 2024-11-04 PROCEDURE — 77063 BREAST TOMOSYNTHESIS BI: CPT

## 2024-11-04 PROCEDURE — 77080 DXA BONE DENSITY AXIAL: CPT

## 2024-11-26 ENCOUNTER — PREP FOR SURGERY (OUTPATIENT)
Dept: OTHER | Facility: HOSPITAL | Age: 74
End: 2024-11-26
Payer: MEDICARE

## 2024-11-26 ENCOUNTER — OFFICE VISIT (OUTPATIENT)
Dept: ORTHOPEDIC SURGERY | Facility: CLINIC | Age: 74
End: 2024-11-26
Payer: MEDICARE

## 2024-11-26 VITALS
HEIGHT: 66 IN | BODY MASS INDEX: 30.9 KG/M2 | WEIGHT: 192.24 LBS | SYSTOLIC BLOOD PRESSURE: 117 MMHG | OXYGEN SATURATION: 97 % | HEART RATE: 75 BPM | DIASTOLIC BLOOD PRESSURE: 67 MMHG

## 2024-11-26 DIAGNOSIS — M17.12 PRIMARY OSTEOARTHRITIS OF LEFT KNEE: ICD-10-CM

## 2024-11-26 DIAGNOSIS — M25.561 RIGHT KNEE PAIN, UNSPECIFIED CHRONICITY: ICD-10-CM

## 2024-11-26 DIAGNOSIS — M17.12 PRIMARY OSTEOARTHRITIS OF LEFT KNEE: Primary | ICD-10-CM

## 2024-11-26 DIAGNOSIS — Z96.651 S/P TOTAL KNEE ARTHROPLASTY, RIGHT: Primary | ICD-10-CM

## 2024-11-26 DIAGNOSIS — M25.562 LEFT KNEE PAIN, UNSPECIFIED CHRONICITY: ICD-10-CM

## 2024-11-26 RX ORDER — TRANEXAMIC ACID 10 MG/ML
1000 INJECTION, SOLUTION INTRAVENOUS ONCE
OUTPATIENT
Start: 2024-11-26 | End: 2024-11-26

## 2024-11-26 RX ORDER — CYCLOBENZAPRINE HCL 10 MG
1 TABLET ORAL 3 TIMES DAILY
COMMUNITY
Start: 2024-10-09

## 2024-11-26 NOTE — PROGRESS NOTES
"Chief Complaint  Follow-up of the Right Knee    Subjective      Katie Rudd presents to [facility] for follow up of her right knee.  Patient is 12 weeks status post right total knee arthroplasty performed by Dr. Platt on 8/26/2024. The right knee is doing better, her ROM is improved. Her low back pain is also better after going to PT for some alignment and lifestyle modification. Her primary complaint today is of the left knee. She has severe L knee osteoarthritis. Prior steroid injection did not help at all. She is interested in scheduling surgical intervention today.     No Known Allergies    Objective     Vital Signs:   Vitals:    11/26/24 1302   BP: 117/67   Pulse: 75   SpO2: 97%   Weight: 87.2 kg (192 lb 3.9 oz)   Height: 167.6 cm (66\")     Body mass index is 31.03 kg/m².    I reviewed the patient's chief complaint, history of present illness, review of systems, past medical history, surgical history, family history, social history, medications, and allergy list.     Ortho Exam  Knee   General: Alert, no acute distress.   Right knee: Healed incision.  Knee stable to varus/valgus stress.  Knee extensor mechanism intact. Full knee extension. Flexion to 125 degrees. Calf soft, non-tender.  Sensation and neurovascularly intact.  Demonstrates active ankle dorsiflexion and plantarflexion.  Palpable pedal pulses.     Left knee: Knee stable to varus/valgus stress.  Knee extensor mechanism intact. -5 degrees knee extension. Flexion to 115 degrees. Tenderness to palpation over medial joint line.  Calf soft, non-tender.  Sensation and neurovascularly intact.  Demonstrates active ankle dorsiflexion and plantarflexion.  Palpable pedal pulses.           Imaging Results (Most Recent)       Procedure Component Value Units Date/Time    XR Knee 3 View Left [[id number]] Resulted: 11/26/24 1551     Updated: 11/26/24 1551    Narrative:      X-Ray Report:  Study: X-rays ordered, taken in the " office, and reviewed today  Site: Left knee xray  Indication: Left knee pain  View: AP, lateral left knee view(s)  Findings: Severe tricompartmental osteoarthritis with several osteophyte   formations are seen.  No acute fractures.  Prior studies available for comparison: yes     XR Knee 3 View Right [[id number]] Resulted: 11/26/24 1355     Updated: 11/26/24 1355    Narrative:      X-Ray Report:  Study: X-rays ordered, taken in the office, and reviewed today.   Site: Right knee Xray  Indication: Right total knee arthroplasty follow up.   View: AP/Lateral, Standing, and [place] view(s)  Findings: Intact right total knee arthroplasty. No evidence of hardware   malfunction, complication or loosening. No periprosthetic fractures   visualized. Patella is midline tracking on sunrise view.   Prior studies available for comparison: yes                    Assessment and Plan   Diagnoses and all orders for this visit:    1. S/P total knee arthroplasty, right (Primary)  -     XR Knee 3 View Right    2. Left knee pain, unspecified chronicity  -     XR Knee 3 View Left    3. Right knee pain, unspecified chronicity    4. Primary osteoarthritis of left knee         Katie Rudd presents today 12 weeks post op right total knee arthroplasty performed by Dr. Platt on 8/26/2024. X-rays were reviewed with the patient today. Hardware is stable and intact. Patient is doing well.     Incision is well healed. Encouraged to continue home exercises for ROM and strengthening. May continue icing as needed for no more than 20 minutes at a time for comfort and inflammation. Discussed avoiding kneeling directly on to the prosthetic and avoid deep squatting.     Discussed the need for antibiotic prophylaxis with any dental procedures following total joint replacement for life. Patient instructed to contact office if dental office is reluctant to prescribe antibiotics prior to procedure and we will prescribe them.      Patient will  follow-up in 9 months for the right knee. We will obtain new x-rays of the right knee at next visit.    Discussed left total knee arthroplasty surgery with the patient. Risks/benefits discussed with patient including, but not limited to: infection, bleeding, neurovascular damage, malunion, nonunion, aesthetic deformity, need for further surgery, and death. Discussed with patient the implant type being used during surgery. Patient understands and desires to proceed. Surgery pamphlet provided to patient. Patient is to meet with our surgery scheduler at check out and proceed with scheduling of surgery.     All questions and concerns were addressed and answered. Patient left the office without any additional questions.       Tobacco Use: Low Risk  (11/26/2024)    Patient History     Smoking Tobacco Use: Never     Smokeless Tobacco Use: Never     Passive Exposure: Not on file     Patient reports that they are a nonsmoker; cessation education not applicable.       Follow Up   No follow-ups on file.  There are no Patient Instructions on file for this visit.    Patient was given instructions and counseling regarding her condition or for health maintenance advice. Please see specific information pulled into the AVS if appropriate.       Dictated Utilizing Dragon Dictation. Please note that portions of this note were completed with a voice recognition program. Part of this note may be an electronic transcription/translation of spoken language to printed text using the Dragon Dictation System.

## 2025-01-13 DIAGNOSIS — Z96.652 AFTERCARE FOLLOWING LEFT KNEE JOINT REPLACEMENT SURGERY: Primary | ICD-10-CM

## 2025-01-13 DIAGNOSIS — Z47.1 AFTERCARE FOLLOWING LEFT KNEE JOINT REPLACEMENT SURGERY: Primary | ICD-10-CM

## 2025-02-05 ENCOUNTER — PRE-ADMISSION TESTING (OUTPATIENT)
Dept: PREADMISSION TESTING | Facility: HOSPITAL | Age: 75
End: 2025-02-05
Payer: MEDICARE

## 2025-02-05 VITALS
RESPIRATION RATE: 16 BRPM | BODY MASS INDEX: 29.58 KG/M2 | TEMPERATURE: 97.5 F | SYSTOLIC BLOOD PRESSURE: 138 MMHG | OXYGEN SATURATION: 100 % | WEIGHT: 184.08 LBS | DIASTOLIC BLOOD PRESSURE: 52 MMHG | HEIGHT: 66 IN | HEART RATE: 57 BPM

## 2025-02-05 DIAGNOSIS — M17.12 PRIMARY OSTEOARTHRITIS OF LEFT KNEE: ICD-10-CM

## 2025-02-05 DIAGNOSIS — M25.562 LEFT KNEE PAIN, UNSPECIFIED CHRONICITY: ICD-10-CM

## 2025-02-05 DIAGNOSIS — Z01.818 ENCOUNTER FOR PREADMISSION TESTING: Primary | ICD-10-CM

## 2025-02-05 LAB
ALBUMIN SERPL-MCNC: 4 G/DL (ref 3.5–5.2)
ALBUMIN/GLOB SERPL: 1.5 G/DL
ALP SERPL-CCNC: 111 U/L (ref 39–117)
ALT SERPL W P-5'-P-CCNC: 17 U/L (ref 1–33)
ANION GAP SERPL CALCULATED.3IONS-SCNC: 8.4 MMOL/L (ref 5–15)
AST SERPL-CCNC: 16 U/L (ref 1–32)
BASOPHILS # BLD AUTO: 0.02 10*3/MM3 (ref 0–0.2)
BASOPHILS NFR BLD AUTO: 0.3 % (ref 0–1.5)
BILIRUB SERPL-MCNC: 0.8 MG/DL (ref 0–1.2)
BUN SERPL-MCNC: 13 MG/DL (ref 8–23)
BUN/CREAT SERPL: 19.7 (ref 7–25)
CALCIUM SPEC-SCNC: 9 MG/DL (ref 8.6–10.5)
CHLORIDE SERPL-SCNC: 104 MMOL/L (ref 98–107)
CO2 SERPL-SCNC: 27.6 MMOL/L (ref 22–29)
CREAT SERPL-MCNC: 0.66 MG/DL (ref 0.57–1)
DEPRECATED RDW RBC AUTO: 47.8 FL (ref 37–54)
EGFRCR SERPLBLD CKD-EPI 2021: 91.6 ML/MIN/1.73
EOSINOPHIL # BLD AUTO: 0.07 10*3/MM3 (ref 0–0.4)
EOSINOPHIL NFR BLD AUTO: 1.1 % (ref 0.3–6.2)
ERYTHROCYTE [DISTWIDTH] IN BLOOD BY AUTOMATED COUNT: 14.5 % (ref 12.3–15.4)
GLOBULIN UR ELPH-MCNC: 2.6 GM/DL
GLUCOSE SERPL-MCNC: 96 MG/DL (ref 65–99)
HBA1C MFR BLD: 5.4 % (ref 4.8–5.6)
HCT VFR BLD AUTO: 43.6 % (ref 34–46.6)
HGB BLD-MCNC: 14 G/DL (ref 12–15.9)
IMM GRANULOCYTES # BLD AUTO: 0.02 10*3/MM3 (ref 0–0.05)
IMM GRANULOCYTES NFR BLD AUTO: 0.3 % (ref 0–0.5)
LYMPHOCYTES # BLD AUTO: 1.7 10*3/MM3 (ref 0.7–3.1)
LYMPHOCYTES NFR BLD AUTO: 25.8 % (ref 19.6–45.3)
MCH RBC QN AUTO: 28.9 PG (ref 26.6–33)
MCHC RBC AUTO-ENTMCNC: 32.1 G/DL (ref 31.5–35.7)
MCV RBC AUTO: 90.1 FL (ref 79–97)
MONOCYTES # BLD AUTO: 0.45 10*3/MM3 (ref 0.1–0.9)
MONOCYTES NFR BLD AUTO: 6.8 % (ref 5–12)
NEUTROPHILS NFR BLD AUTO: 4.32 10*3/MM3 (ref 1.7–7)
NEUTROPHILS NFR BLD AUTO: 65.7 % (ref 42.7–76)
NRBC BLD AUTO-RTO: 0 /100 WBC (ref 0–0.2)
PLATELET # BLD AUTO: 195 10*3/MM3 (ref 140–450)
PMV BLD AUTO: 9.7 FL (ref 6–12)
POTASSIUM SERPL-SCNC: 4.6 MMOL/L (ref 3.5–5.2)
PROT SERPL-MCNC: 6.6 G/DL (ref 6–8.5)
RBC # BLD AUTO: 4.84 10*6/MM3 (ref 3.77–5.28)
SODIUM SERPL-SCNC: 140 MMOL/L (ref 136–145)
WBC NRBC COR # BLD AUTO: 6.58 10*3/MM3 (ref 3.4–10.8)

## 2025-02-05 PROCEDURE — 83036 HEMOGLOBIN GLYCOSYLATED A1C: CPT

## 2025-02-05 PROCEDURE — 36415 COLL VENOUS BLD VENIPUNCTURE: CPT

## 2025-02-05 PROCEDURE — 80053 COMPREHEN METABOLIC PANEL: CPT

## 2025-02-05 PROCEDURE — 85025 COMPLETE CBC W/AUTO DIFF WBC: CPT

## 2025-02-05 RX ORDER — VIBEGRON 75 MG/1
75 TABLET, FILM COATED ORAL DAILY
Status: ON HOLD | COMMUNITY

## 2025-02-05 NOTE — SIGNIFICANT NOTE
Pt goal is to have less pain, Pt using [facility] in [place] for therapy.  Pt's spouse to be support with aftercare.

## 2025-02-05 NOTE — DISCHARGE INSTRUCTIONS
IMPORTANT INSTRUCTIONS - PRE-ADMISSION TESTING  DO NOT EAT OR CHEW anything after midnight the night before your procedure.    You may have CLEAR liquids up to ___3___ hours prior to ARRIVAL time.   Take the following medications the morning of your procedure with JUST A SIP OF WATER:  ZOLOFT, AMLODIPINE      DO NOT BRING your medications to the hospital with you, UNLESS something has changed since your PRE-Admission Testing appointment.  Hold all vitamins, supplements, and NSAIDS (Non- steroidal anti-inflammatory meds) for one week prior to surgery (you MAY take Tylenol or Acetaminophen).     Bring your CPAP or BIPAP to hospital, ONLY IF YOU WILL BE SPENDING THE NIGHT.   Make sure you have a ride home and have someone who will stay with you the day of your procedure after you go home.  If you have any questions, please call your Pre-Admission Testing Nurse, _MAX RHOADES RN at [phone].   Per anesthesia request, do not smoke for 24 hours before your procedure or as instructed by your surgeon.    Clear Liquid Diet        Find out when you need to start a clear liquid diet.   Think of “clear liquids” as anything you could read a newspaper through. This includes things like water, broth, sports drinks, or tea WITHOUT any kind of milk or cream.           Once you are told to start a clear liquid diet, only drink these things until 2 hours before arrival to the hospital or when the hospital says to stop. Total volume limitation: 8 oz.       Clear liquids you CAN drink:   Water   Clear broth: beef, chicken, vegetable, or bone broth with nothing in it   Gatorade   Lemonade or Waldo-aid   Soda   Tea, coffee (NO cream or honey)   Jell-O (without fruit)   Popsicles (without fruit or cream)   Italian ices   Juice without pulp: apple, white, grape   You may use salt, pepper, and sugar  No red liquids  DRINK A 20 OZ BOTTLE OF GATORADE MORNING OF SURGERY UNLESS YOU ARE AN EARLY ARRIVAL TIME THEN DRINK BEFORE YOU GO TO BED THE  NIGHT BEFORE.      Do NOT drink:   Milk or cream   Soy milk, almond milk, coconut milk, or other non-dairy drinks and   creamers   Milkshakes or smoothies   Tomato juice   Orange juice   Grapefruit juice   Cream soups or any other than broth         Clear Liquid Diet:  Do NOT eat any solid food.  Do NOT eat or suck on mints or candy.  Do NOT chew gum.  Do NOT drink thick liquids like milk or juice with pulp in it.  Do NOT add milk, cream, or anything like soy milk or almond milk to coffee or tea.     PREOPERATIVE (BEFORE SURGERY)              BATHING INSTRUCTIONS  Instructions:    You will need to shower  3 separate times utilizing the soap provided; at the times indicated   below:     2/8/25 PM   2/9/25 AM     2/9/25 PM        Wash your hair and face with normal shampoo and soap, rinse it well before using the surgical soap.      In the shower, wet the skin completely with water from your neck to your feet. Apply the cleanser to your   body ONLY FROM THE NECK TO YOUR FEET.     Do NOT USE THE CLEANSER ON YOUR FACE, HEAD, OR GENITAL (PRIVATE) AREAS.   Keep it out of your eyes, ears, and mouth because of the risk of injury to those areas.      Scrub with a clean washcloth for each bath utilizing the soap provided from the top of your body to the   bottom starting at the neck area.      Pay close attention to your armpits, groin area, and the site of surgery.      Wash your body gently for 5 minutes. Stand outside the stream or turn off the water while scrubbing your   body. Do NOT wash with your regular soap after the surgical cleanser is used.      RINSE THE CLEANSER OFF COMPLETELY with plenty of water. Rinse the area again thoroughly.      Dry off with a clean towel. The surgical soap can cause dryness; however do NOT APPLY LOTION,   CREAM, POWDER, and/or DEODORANT AFTER SHOWERING.     Be sure to where clean clothes after showering.      Ensure CLEAN BED LINENS AFTER FIRST wash with the surgical soap.      NO PETS  ALLOWED IN THE BED with you after utilizing the surgical soap.

## 2025-02-06 ENCOUNTER — ANESTHESIA EVENT (OUTPATIENT)
Dept: PERIOP | Facility: HOSPITAL | Age: 75
End: 2025-02-06
Payer: MEDICARE

## 2025-02-08 NOTE — H&P
Chief Complaint  No chief complaint on file.    Subjective      Katie Rudd presents to [facility] for follow up of her right knee.  Patient is 12 weeks status post right total knee arthroplasty performed by Dr. Platt on 8/26/2024. The right knee is doing better, her ROM is improved. Her low back pain is also better after going to PT for some alignment and lifestyle modification. Her primary complaint today is of the left knee. She has severe L knee osteoarthritis. Prior steroid injection did not help at all. She is interested in scheduling surgical intervention today.     No Known Allergies    Objective     Vital Signs:   There were no vitals filed for this visit.    There is no height or weight on file to calculate BMI.    I reviewed the patient's chief complaint, history of present illness, review of systems, past medical history, surgical history, family history, social history, medications, and allergy list.     Ortho Exam  Knee   General: Alert, no acute distress.   Right knee: Healed incision.  Knee stable to varus/valgus stress.  Knee extensor mechanism intact. Full knee extension. Flexion to 125 degrees. Calf soft, non-tender.  Sensation and neurovascularly intact.  Demonstrates active ankle dorsiflexion and plantarflexion.  Palpable pedal pulses.     Left knee: Knee stable to varus/valgus stress.  Knee extensor mechanism intact. -5 degrees knee extension. Flexion to 115 degrees. Tenderness to palpation over medial joint line.  Calf soft, non-tender.  Sensation and neurovascularly intact.  Demonstrates active ankle dorsiflexion and plantarflexion.  Palpable pedal pulses.           Imaging Results (Most Recent)       None                Assessment and Plan   There are no diagnoses linked to this encounter.       Katie Rudd presents today 12 weeks post op right total knee arthroplasty performed by Dr. Platt on 8/26/2024. X-rays were reviewed with the patient today.  Hardware is stable and intact. Patient is doing well.     Incision is well healed. Encouraged to continue home exercises for ROM and strengthening. May continue icing as needed for no more than 20 minutes at a time for comfort and inflammation. Discussed avoiding kneeling directly on to the prosthetic and avoid deep squatting.     Discussed the need for antibiotic prophylaxis with any dental procedures following total joint replacement for life. Patient instructed to contact office if dental office is reluctant to prescribe antibiotics prior to procedure and we will prescribe them.      Patient will follow-up in 9 months for the right knee. We will obtain new x-rays of the right knee at next visit.    Discussed left total knee arthroplasty surgery with the patient. Risks/benefits discussed with patient including, but not limited to: infection, bleeding, neurovascular damage, malunion, nonunion, aesthetic deformity, need for further surgery, and death. Discussed with patient the implant type being used during surgery. Patient understands and desires to proceed. Surgery pamphlet provided to patient. Patient is to meet with our surgery scheduler at check out and proceed with scheduling of surgery.     All questions and concerns were addressed and answered. Patient left the office without any additional questions.       Tobacco Use: Low Risk  (2/5/2025)    Patient History     Smoking Tobacco Use: Never     Smokeless Tobacco Use: Never     Passive Exposure: Not on file     Patient reports that they are a nonsmoker; cessation education not applicable.       Follow Up   Post op

## 2025-02-10 ENCOUNTER — APPOINTMENT (OUTPATIENT)
Dept: GENERAL RADIOLOGY | Facility: HOSPITAL | Age: 75
End: 2025-02-10
Payer: MEDICARE

## 2025-02-10 ENCOUNTER — ANESTHESIA (OUTPATIENT)
Dept: PERIOP | Facility: HOSPITAL | Age: 75
End: 2025-02-10
Payer: MEDICARE

## 2025-02-10 ENCOUNTER — HOSPITAL ENCOUNTER (OUTPATIENT)
Facility: HOSPITAL | Age: 75
Discharge: HOME | End: 2025-02-11
Attending: ORTHOPAEDIC SURGERY | Admitting: ORTHOPAEDIC SURGERY
Payer: MEDICARE

## 2025-02-10 ENCOUNTER — ANESTHESIA EVENT CONVERTED (OUTPATIENT)
Dept: ANESTHESIOLOGY | Facility: HOSPITAL | Age: 75
End: 2025-02-10
Payer: MEDICARE

## 2025-02-10 DIAGNOSIS — M25.562 LEFT KNEE PAIN, UNSPECIFIED CHRONICITY: ICD-10-CM

## 2025-02-10 DIAGNOSIS — Z78.9 DECREASED ACTIVITIES OF DAILY LIVING (ADL): ICD-10-CM

## 2025-02-10 DIAGNOSIS — M17.12 PRIMARY OSTEOARTHRITIS OF LEFT KNEE: ICD-10-CM

## 2025-02-10 DIAGNOSIS — R26.2 DIFFICULTY IN WALKING: Primary | ICD-10-CM

## 2025-02-10 PROCEDURE — 25810000003 LACTATED RINGERS PER 1000 ML: Performed by: ORTHOPAEDIC SURGERY

## 2025-02-10 PROCEDURE — 25010000002 ROPIVACAINE PER 1 MG: Performed by: ORTHOPAEDIC SURGERY

## 2025-02-10 PROCEDURE — 25010000002 CEFAZOLIN PER 500 MG: Performed by: ORTHOPAEDIC SURGERY

## 2025-02-10 PROCEDURE — 25010000002 MORPHINE SULFATE 10 MG/ML SOLUTION: Performed by: ORTHOPAEDIC SURGERY

## 2025-02-10 PROCEDURE — A9270 NON-COVERED ITEM OR SERVICE: HCPCS | Performed by: ORTHOPAEDIC SURGERY

## 2025-02-10 PROCEDURE — A9270 NON-COVERED ITEM OR SERVICE: HCPCS | Performed by: NURSE ANESTHETIST, CERTIFIED REGISTERED

## 2025-02-10 PROCEDURE — 25010000002 CEFAZOLIN PER 500 MG

## 2025-02-10 PROCEDURE — A9270 NON-COVERED ITEM OR SERVICE: HCPCS | Performed by: PHYSICIAN ASSISTANT

## 2025-02-10 PROCEDURE — 94761 N-INVAS EAR/PLS OXIMETRY MLT: CPT

## 2025-02-10 PROCEDURE — 25010000002 EPINEPHRINE 1 MG/ML SOLUTION: Performed by: ORTHOPAEDIC SURGERY

## 2025-02-10 PROCEDURE — 94799 UNLISTED PULMONARY SVC/PX: CPT

## 2025-02-10 PROCEDURE — 97116 GAIT TRAINING THERAPY: CPT

## 2025-02-10 PROCEDURE — 73560 X-RAY EXAM OF KNEE 1 OR 2: CPT

## 2025-02-10 PROCEDURE — 25010000002 KETOROLAC TROMETHAMINE PER 15 MG: Performed by: ORTHOPAEDIC SURGERY

## 2025-02-10 PROCEDURE — 63710000001 FERROUS SULFATE 325 (65 FE) MG TABLET: Performed by: ORTHOPAEDIC SURGERY

## 2025-02-10 PROCEDURE — 63710000001 WHITE PETROLATUM OINTMENT 5 G BOX: Performed by: NURSE ANESTHETIST, CERTIFIED REGISTERED

## 2025-02-10 PROCEDURE — 97150 GROUP THERAPEUTIC PROCEDURES: CPT

## 2025-02-10 PROCEDURE — 25010000002 LIDOCAINE PF 2% 2 % SOLUTION: Performed by: NURSE ANESTHETIST, CERTIFIED REGISTERED

## 2025-02-10 PROCEDURE — 25010000002 PROPOFOL 10 MG/ML EMULSION: Performed by: NURSE ANESTHETIST, CERTIFIED REGISTERED

## 2025-02-10 PROCEDURE — 63710000001 FAMOTIDINE 20 MG TABLET: Performed by: ORTHOPAEDIC SURGERY

## 2025-02-10 PROCEDURE — 63710000001 PROMETHAZINE 25 MG SUPPOSITORY: Performed by: NURSE ANESTHETIST, CERTIFIED REGISTERED

## 2025-02-10 PROCEDURE — 63710000001 ATORVASTATIN 20 MG TABLET: Performed by: PHYSICIAN ASSISTANT

## 2025-02-10 PROCEDURE — C1713 ANCHOR/SCREW BN/BN,TIS/BN: HCPCS | Performed by: ORTHOPAEDIC SURGERY

## 2025-02-10 PROCEDURE — 97161 PT EVAL LOW COMPLEX 20 MIN: CPT

## 2025-02-10 PROCEDURE — 63710000001 ACETAMINOPHEN EXTRA STRENGTH 500 MG TABLET: Performed by: ORTHOPAEDIC SURGERY

## 2025-02-10 PROCEDURE — 63710000001 SENNOSIDES-DOCUSATE 8.6-50 MG TABLET: Performed by: ORTHOPAEDIC SURGERY

## 2025-02-10 PROCEDURE — C1776 JOINT DEVICE (IMPLANTABLE): HCPCS | Performed by: ORTHOPAEDIC SURGERY

## 2025-02-10 PROCEDURE — 25810000003 LACTATED RINGERS PER 1000 ML: Performed by: ANESTHESIOLOGY

## 2025-02-10 PROCEDURE — 25010000002 DEXAMETHASONE PER 1 MG: Performed by: NURSE ANESTHETIST, CERTIFIED REGISTERED

## 2025-02-10 PROCEDURE — 25010000002 ONDANSETRON PER 1 MG: Performed by: NURSE ANESTHETIST, CERTIFIED REGISTERED

## 2025-02-10 PROCEDURE — 25010000002 SUGAMMADEX 200 MG/2ML SOLUTION: Performed by: NURSE ANESTHETIST, CERTIFIED REGISTERED

## 2025-02-10 PROCEDURE — 25010000002 FENTANYL CITRATE (PF) 50 MCG/ML SOLUTION: Performed by: NURSE ANESTHETIST, CERTIFIED REGISTERED

## 2025-02-10 DEVICE — BEAR TIB/KN VANGUARD AS 10X67MM NS: Type: IMPLANTABLE DEVICE | Site: KNEE | Status: FUNCTIONAL

## 2025-02-10 DEVICE — TRY TIB CRUC 67MM: Type: IMPLANTABLE DEVICE | Site: KNEE | Status: FUNCTIONAL

## 2025-02-10 DEVICE — CAP TOTL KN CMT PRIMARY: Type: IMPLANTABLE DEVICE | Site: KNEE | Status: FUNCTIONAL

## 2025-02-10 DEVICE — PAT 3PEG STD 8X28MM: Type: IMPLANTABLE DEVICE | Site: KNEE | Status: FUNCTIONAL

## 2025-02-10 DEVICE — CMT BONE PALACOS R HI/VISC 1X40: Type: IMPLANTABLE DEVICE | Site: KNEE | Status: FUNCTIONAL

## 2025-02-10 DEVICE — COMP FEM/KN VANGUARD INTLK CR 62.5MM NS LT: Type: IMPLANTABLE DEVICE | Site: KNEE | Status: FUNCTIONAL

## 2025-02-10 RX ORDER — PROMETHAZINE HYDROCHLORIDE 25 MG/1
25 TABLET ORAL ONCE AS NEEDED
Status: COMPLETED | OUTPATIENT
Start: 2025-02-10 | End: 2025-02-10

## 2025-02-10 RX ORDER — SODIUM CHLORIDE 0.9 % (FLUSH) 0.9 %
10 SYRINGE (ML) INJECTION AS NEEDED
Status: DISCONTINUED | OUTPATIENT
Start: 2025-02-10 | End: 2025-02-10 | Stop reason: HOSPADM

## 2025-02-10 RX ORDER — ONDANSETRON 2 MG/ML
4 INJECTION INTRAMUSCULAR; INTRAVENOUS EVERY 6 HOURS PRN
Status: DISCONTINUED | OUTPATIENT
Start: 2025-02-10 | End: 2025-02-11 | Stop reason: HOSPADM

## 2025-02-10 RX ORDER — ROCURONIUM BROMIDE 10 MG/ML
INJECTION, SOLUTION INTRAVENOUS AS NEEDED
Status: DISCONTINUED | OUTPATIENT
Start: 2025-02-10 | End: 2025-02-10 | Stop reason: SURG

## 2025-02-10 RX ORDER — PROMETHAZINE HYDROCHLORIDE 25 MG/1
25 SUPPOSITORY RECTAL ONCE AS NEEDED
Status: COMPLETED | OUTPATIENT
Start: 2025-02-10 | End: 2025-02-10

## 2025-02-10 RX ORDER — PETROLATUM,WHITE
OINTMENT IN PACKET (GRAM) TOPICAL AS NEEDED
Status: DISCONTINUED | OUTPATIENT
Start: 2025-02-10 | End: 2025-02-10 | Stop reason: SURG

## 2025-02-10 RX ORDER — ONDANSETRON 2 MG/ML
4 INJECTION INTRAMUSCULAR; INTRAVENOUS ONCE AS NEEDED
Status: DISCONTINUED | OUTPATIENT
Start: 2025-02-10 | End: 2025-02-10 | Stop reason: HOSPADM

## 2025-02-10 RX ORDER — HYDROCODONE BITARTRATE AND ACETAMINOPHEN 7.5; 325 MG/1; MG/1
1 TABLET ORAL EVERY 4 HOURS PRN
Status: DISCONTINUED | OUTPATIENT
Start: 2025-02-10 | End: 2025-02-11 | Stop reason: HOSPADM

## 2025-02-10 RX ORDER — MIDAZOLAM HYDROCHLORIDE 2 MG/2ML
2 INJECTION, SOLUTION INTRAMUSCULAR; INTRAVENOUS ONCE
Status: DISCONTINUED | OUTPATIENT
Start: 2025-02-10 | End: 2025-02-10 | Stop reason: HOSPADM

## 2025-02-10 RX ORDER — ATORVASTATIN CALCIUM 20 MG/1
20 TABLET, FILM COATED ORAL NIGHTLY
Status: DISCONTINUED | OUTPATIENT
Start: 2025-02-10 | End: 2025-02-11 | Stop reason: HOSPADM

## 2025-02-10 RX ORDER — PROPOFOL 10 MG/ML
VIAL (ML) INTRAVENOUS AS NEEDED
Status: DISCONTINUED | OUTPATIENT
Start: 2025-02-10 | End: 2025-02-10 | Stop reason: SURG

## 2025-02-10 RX ORDER — FERROUS SULFATE 325(65) MG
325 TABLET ORAL
Status: DISCONTINUED | OUTPATIENT
Start: 2025-02-10 | End: 2025-02-11 | Stop reason: HOSPADM

## 2025-02-10 RX ORDER — SODIUM CHLORIDE 9 MG/ML
40 INJECTION, SOLUTION INTRAVENOUS AS NEEDED
Status: DISCONTINUED | OUTPATIENT
Start: 2025-02-10 | End: 2025-02-10 | Stop reason: HOSPADM

## 2025-02-10 RX ORDER — OXYCODONE HYDROCHLORIDE 5 MG/1
5 TABLET ORAL
Status: DISCONTINUED | OUTPATIENT
Start: 2025-02-10 | End: 2025-02-10 | Stop reason: HOSPADM

## 2025-02-10 RX ORDER — DEXAMETHASONE SODIUM PHOSPHATE 4 MG/ML
INJECTION, SOLUTION INTRA-ARTICULAR; INTRALESIONAL; INTRAMUSCULAR; INTRAVENOUS; SOFT TISSUE AS NEEDED
Status: DISCONTINUED | OUTPATIENT
Start: 2025-02-10 | End: 2025-02-10 | Stop reason: SURG

## 2025-02-10 RX ORDER — TRANEXAMIC ACID 10 MG/ML
1000 INJECTION, SOLUTION INTRAVENOUS ONCE
Status: DISCONTINUED | OUTPATIENT
Start: 2025-02-10 | End: 2025-02-10 | Stop reason: HOSPADM

## 2025-02-10 RX ORDER — ONDANSETRON 4 MG/1
4 TABLET, ORALLY DISINTEGRATING ORAL EVERY 6 HOURS PRN
Status: DISCONTINUED | OUTPATIENT
Start: 2025-02-10 | End: 2025-02-11 | Stop reason: HOSPADM

## 2025-02-10 RX ORDER — HYDROCODONE BITARTRATE AND ACETAMINOPHEN 7.5; 325 MG/1; MG/1
2 TABLET ORAL EVERY 4 HOURS PRN
Status: DISCONTINUED | OUTPATIENT
Start: 2025-02-10 | End: 2025-02-11 | Stop reason: HOSPADM

## 2025-02-10 RX ORDER — ACETAMINOPHEN 500 MG
1000 TABLET ORAL ONCE
Status: COMPLETED | OUTPATIENT
Start: 2025-02-10 | End: 2025-02-10

## 2025-02-10 RX ORDER — FAMOTIDINE 20 MG/1
40 TABLET, FILM COATED ORAL DAILY
Status: DISCONTINUED | OUTPATIENT
Start: 2025-02-10 | End: 2025-02-11 | Stop reason: HOSPADM

## 2025-02-10 RX ORDER — SODIUM CHLORIDE, SODIUM LACTATE, POTASSIUM CHLORIDE, CALCIUM CHLORIDE 600; 310; 30; 20 MG/100ML; MG/100ML; MG/100ML; MG/100ML
9 INJECTION, SOLUTION INTRAVENOUS CONTINUOUS
Status: ACTIVE | OUTPATIENT
Start: 2025-02-10 | End: 2025-02-11

## 2025-02-10 RX ORDER — LIDOCAINE HYDROCHLORIDE 20 MG/ML
INJECTION, SOLUTION EPIDURAL; INFILTRATION; INTRACAUDAL; PERINEURAL AS NEEDED
Status: DISCONTINUED | OUTPATIENT
Start: 2025-02-10 | End: 2025-02-10 | Stop reason: SURG

## 2025-02-10 RX ORDER — TRANEXAMIC ACID 10 MG/ML
1000 INJECTION, SOLUTION INTRAVENOUS ONCE
Status: COMPLETED | OUTPATIENT
Start: 2025-02-10 | End: 2025-02-10

## 2025-02-10 RX ORDER — DEXMEDETOMIDINE HYDROCHLORIDE 100 UG/ML
INJECTION, SOLUTION INTRAVENOUS AS NEEDED
Status: DISCONTINUED | OUTPATIENT
Start: 2025-02-10 | End: 2025-02-10 | Stop reason: SURG

## 2025-02-10 RX ORDER — BUPIVACAINE HYDROCHLORIDE AND EPINEPHRINE 5; 5 MG/ML; UG/ML
INJECTION, SOLUTION EPIDURAL; INTRACAUDAL; PERINEURAL
Status: COMPLETED | OUTPATIENT
Start: 2025-02-10 | End: 2025-02-10

## 2025-02-10 RX ORDER — ONDANSETRON 2 MG/ML
INJECTION INTRAMUSCULAR; INTRAVENOUS AS NEEDED
Status: DISCONTINUED | OUTPATIENT
Start: 2025-02-10 | End: 2025-02-10 | Stop reason: SURG

## 2025-02-10 RX ORDER — NALOXONE HCL 0.4 MG/ML
0.4 VIAL (ML) INJECTION
Status: DISCONTINUED | OUTPATIENT
Start: 2025-02-10 | End: 2025-02-11 | Stop reason: HOSPADM

## 2025-02-10 RX ORDER — BUPIVACAINE HYDROCHLORIDE AND EPINEPHRINE 5; 5 MG/ML; UG/ML
INJECTION, SOLUTION EPIDURAL; INTRACAUDAL; PERINEURAL
Status: COMPLETED
Start: 2025-02-10 | End: 2025-02-10

## 2025-02-10 RX ORDER — CELECOXIB 100 MG/1
200 CAPSULE ORAL ONCE
Status: DISCONTINUED | OUTPATIENT
Start: 2025-02-10 | End: 2025-02-10 | Stop reason: HOSPADM

## 2025-02-10 RX ORDER — KETOROLAC TROMETHAMINE 15 MG/ML
15 INJECTION, SOLUTION INTRAMUSCULAR; INTRAVENOUS EVERY 6 HOURS SCHEDULED
Status: COMPLETED | OUTPATIENT
Start: 2025-02-10 | End: 2025-02-11

## 2025-02-10 RX ORDER — EPINEPHRINE 1 MG/ML
INJECTION, SOLUTION INTRAMUSCULAR; SUBCUTANEOUS
Status: DISPENSED
Start: 2025-02-10 | End: 2025-02-10

## 2025-02-10 RX ORDER — ACETAMINOPHEN 500 MG
1000 TABLET ORAL EVERY 8 HOURS
Status: DISCONTINUED | OUTPATIENT
Start: 2025-02-10 | End: 2025-02-11 | Stop reason: HOSPADM

## 2025-02-10 RX ORDER — AMOXICILLIN 250 MG
2 CAPSULE ORAL 2 TIMES DAILY
Status: DISCONTINUED | OUTPATIENT
Start: 2025-02-10 | End: 2025-02-11 | Stop reason: HOSPADM

## 2025-02-10 RX ORDER — SODIUM CHLORIDE, SODIUM LACTATE, POTASSIUM CHLORIDE, CALCIUM CHLORIDE 600; 310; 30; 20 MG/100ML; MG/100ML; MG/100ML; MG/100ML
100 INJECTION, SOLUTION INTRAVENOUS CONTINUOUS
Status: ACTIVE | OUTPATIENT
Start: 2025-02-10 | End: 2025-02-10

## 2025-02-10 RX ORDER — ENOXAPARIN SODIUM 100 MG/ML
40 INJECTION SUBCUTANEOUS DAILY
Status: DISCONTINUED | OUTPATIENT
Start: 2025-02-11 | End: 2025-02-11 | Stop reason: HOSPADM

## 2025-02-10 RX ORDER — FENTANYL CITRATE 50 UG/ML
INJECTION, SOLUTION INTRAMUSCULAR; INTRAVENOUS AS NEEDED
Status: DISCONTINUED | OUTPATIENT
Start: 2025-02-10 | End: 2025-02-10 | Stop reason: SURG

## 2025-02-10 RX ADMIN — SODIUM CHLORIDE, POTASSIUM CHLORIDE, SODIUM LACTATE AND CALCIUM CHLORIDE: 600; 310; 30; 20 INJECTION, SOLUTION INTRAVENOUS at 07:11

## 2025-02-10 RX ADMIN — ONDANSETRON 4 MG: 2 INJECTION INTRAMUSCULAR; INTRAVENOUS at 08:22

## 2025-02-10 RX ADMIN — SODIUM CHLORIDE 2 G: 9 INJECTION, SOLUTION INTRAVENOUS at 07:11

## 2025-02-10 RX ADMIN — KETOROLAC TROMETHAMINE 15 MG: 15 INJECTION, SOLUTION INTRAMUSCULAR; INTRAVENOUS at 12:30

## 2025-02-10 RX ADMIN — SODIUM CHLORIDE 2000 MG: 9 INJECTION, SOLUTION INTRAVENOUS at 16:46

## 2025-02-10 RX ADMIN — SODIUM CHLORIDE, POTASSIUM CHLORIDE, SODIUM LACTATE AND CALCIUM CHLORIDE: 600; 310; 30; 20 INJECTION, SOLUTION INTRAVENOUS at 08:15

## 2025-02-10 RX ADMIN — LIDOCAINE HYDROCHLORIDE 80 MG: 20 INJECTION, SOLUTION INTRAVENOUS at 07:15

## 2025-02-10 RX ADMIN — KETOROLAC TROMETHAMINE 15 MG: 15 INJECTION, SOLUTION INTRAMUSCULAR; INTRAVENOUS at 23:25

## 2025-02-10 RX ADMIN — PROPOFOL 75 MCG/KG/MIN: 10 INJECTION, EMULSION INTRAVENOUS at 07:17

## 2025-02-10 RX ADMIN — KETOROLAC TROMETHAMINE 15 MG: 15 INJECTION, SOLUTION INTRAMUSCULAR; INTRAVENOUS at 18:29

## 2025-02-10 RX ADMIN — FENTANYL CITRATE 50 MCG: 50 INJECTION, SOLUTION INTRAMUSCULAR; INTRAVENOUS at 07:32

## 2025-02-10 RX ADMIN — ACETAMINOPHEN 1000 MG: 500 TABLET ORAL at 16:46

## 2025-02-10 RX ADMIN — BUPIVACAINE HYDROCHLORIDE AND EPINEPHRINE BITARTRATE 30 ML: 5; .005 INJECTION, SOLUTION EPIDURAL; INTRACAUDAL; PERINEURAL at 07:05

## 2025-02-10 RX ADMIN — DEXMEDETOMIDINE 10 MCG: 100 INJECTION, SOLUTION, CONCENTRATE INTRAVENOUS at 07:41

## 2025-02-10 RX ADMIN — TRANEXAMIC ACID 1000 MG: 10 INJECTION, SOLUTION INTRAVENOUS at 07:06

## 2025-02-10 RX ADMIN — TRANEXAMIC ACID 1000 MG: 10 INJECTION, SOLUTION INTRAVENOUS at 08:04

## 2025-02-10 RX ADMIN — SODIUM CHLORIDE, POTASSIUM CHLORIDE, SODIUM LACTATE AND CALCIUM CHLORIDE 100 ML/HR: 600; 310; 30; 20 INJECTION, SOLUTION INTRAVENOUS at 12:31

## 2025-02-10 RX ADMIN — DEXAMETHASONE SODIUM PHOSPHATE 4 MG: 4 INJECTION, SOLUTION INTRAMUSCULAR; INTRAVENOUS at 07:29

## 2025-02-10 RX ADMIN — SUGAMMADEX 200 MG: 100 INJECTION, SOLUTION INTRAVENOUS at 08:29

## 2025-02-10 RX ADMIN — FAMOTIDINE 40 MG: 20 TABLET ORAL at 12:30

## 2025-02-10 RX ADMIN — DEXMEDETOMIDINE 10 MCG: 100 INJECTION, SOLUTION, CONCENTRATE INTRAVENOUS at 07:37

## 2025-02-10 RX ADMIN — DEXMEDETOMIDINE 10 MCG: 100 INJECTION, SOLUTION, CONCENTRATE INTRAVENOUS at 08:18

## 2025-02-10 RX ADMIN — FERROUS SULFATE TAB 325 MG (65 MG ELEMENTAL FE) 325 MG: 325 (65 FE) TAB at 12:30

## 2025-02-10 RX ADMIN — SENNOSIDES AND DOCUSATE SODIUM 2 TABLET: 50; 8.6 TABLET ORAL at 12:30

## 2025-02-10 RX ADMIN — ATORVASTATIN CALCIUM 20 MG: 20 TABLET, FILM COATED ORAL at 20:10

## 2025-02-10 RX ADMIN — SODIUM CHLORIDE 2000 MG: 9 INJECTION, SOLUTION INTRAVENOUS at 23:25

## 2025-02-10 RX ADMIN — ROCURONIUM BROMIDE 50 MG: 50 INJECTION INTRAVENOUS at 07:15

## 2025-02-10 RX ADMIN — FENTANYL CITRATE 50 MCG: 50 INJECTION, SOLUTION INTRAMUSCULAR; INTRAVENOUS at 07:15

## 2025-02-10 RX ADMIN — ACETAMINOPHEN 1000 MG: 500 TABLET ORAL at 07:10

## 2025-02-10 RX ADMIN — PROPOFOL 130 MG: 10 INJECTION, EMULSION INTRAVENOUS at 07:15

## 2025-02-10 RX ADMIN — DEXMEDETOMIDINE 10 MCG: 100 INJECTION, SOLUTION, CONCENTRATE INTRAVENOUS at 07:33

## 2025-02-10 RX ADMIN — ONDANSETRON 4 MG: 2 INJECTION INTRAMUSCULAR; INTRAVENOUS at 09:11

## 2025-02-10 RX ADMIN — PROMETHAZINE HYDROCHLORIDE 25 MG: 25 SUPPOSITORY RECTAL at 09:28

## 2025-02-10 RX ADMIN — SENNOSIDES AND DOCUSATE SODIUM 2 TABLET: 50; 8.6 TABLET ORAL at 20:10

## 2025-02-10 RX ADMIN — Medication 1 PACKAGE: at 07:17

## 2025-02-10 NOTE — OP NOTE
TOTAL KNEE ARTHROPLASTY  Procedure Report    Patient Name:  Katie Rudd  YOB: 1950    Date of Surgery:  2/10/2025     Indications:  Severe OA, failed conservative treatment    Pre-op Diagnosis:   Left knee pain, unspecified chronicity [M25.562]  Primary osteoarthritis of left knee [M17.12]       Post-Op Diagnosis Codes:     * Left knee pain, unspecified chronicity [M25.562]     * Primary osteoarthritis of left knee [M17.12]    Procedure/CPT® Codes:  CA ARTHRP KNE CONDYLE&PLATU MEDIAL&LAT COMPARTMENTS [97386]    Procedure(s):  LEFT TOTAL KNEE ARTHROPLASTY    Surgical Approach: Knee Medial Parapatellar        Staff:  Surgeon(s):  Glen Platt MD    Assistant: Capri Nguyen Emily M    Anesthesia: General with Block    Estimated Blood Loss: 50 mL    Implants:    Implant Name Type Inv. Item Serial No.  Lot No. LRB No. Used Action   CMT BONE PALACOS R HI/VISC 1X40 - [id number] Implant CMT BONE PALACOS R HI/VISC 1X40  [facility] [id number] Left 2 Implanted   PAT 3PEG STD 8X28MM - [id number] Implant PAT 3PEG STD 8X28MM  [employer] [id number] Left 1 Implanted   TRY TIB CRUC 67MM - [id number] Implant TRY TIB CRUC 67MM  [employer] [id number] Left 1 Implanted   COMP FEM/KN VANGUARD INTLK CR 62.5MM NS LT - [id number] Implant COMP FEM/KN VANGUARD INTLK CR 62.5MM NS LT  [employer] [id number] Left 1 Implanted   BEAR TIB/KN VANGUARD AS 35B28MP NS - [id number] Implant BEAR TIB/KN VANGUARD AS [id number] NS  [employer] [id number] Left 1 Implanted       Specimen:          None        Findings: advance OA    Complications: None    Description of Procedure: The patient was brought to the operating room and underwent general anesthesia, had a preoperative antibiotic, and was then prepped and draped in sterile fashion. An Esmarch was used to exsanguinate the limb. The tourniquet was then inflated. A standard medial parapatellar arthrotomy was performed. An intramedullary guide was  placed in the femur. A distal femoral cut was made and measured. The 4-in-1 block was placed. Excellent cuts were achieved. Bone was removed, followed by removal of the ACL and remaining menisci. The intramedullary guide was placed in the tibia. The tibia was then cut and measured. This was then broached. The patella was then osteotomized, removing approximately 8-10 mm of bone. It measured. There was excellent range of motion, tracking and stability of the trials. The trials were removed. Bony cut surfaces were thoroughly irrigated. The knee was then injected. The cement was vacuum mixed, placed on the undersurface of the components and then packed into place. Excess cement was removed. Once this had hardened, trial polys were tried and the appropriate sized anterior insert was then chosen. This was then locked, thoroughly irrigated. Bovie electrocautery was used for hemostasis. The tourniquet was deflated. This was again thoroughly irrigated and closed using #1 Vicryl, 2-0 Vicryl and staples. A sterile dressing was applied. The patient was then extubated and taken to the recovery room in stable condition. There were no complications.    Assistant: Capri Nguyen Emily M  was responsible for performing the following activities: Retraction, Suction, Irrigation, Closing, and Placing Dressing and their skilled assistance was necessary for the success of this case.    Glen Platt MD     Date: 2/10/2025  Time: 08:35 EST

## 2025-02-10 NOTE — ANESTHESIA PREPROCEDURE EVALUATION
Anesthesia Evaluation     Patient summary reviewed and Nursing notes reviewed   history of anesthetic complications:  PONV  NPO Solid Status: > 8 hours  NPO Liquid Status: > 2 hours           Airway   Mallampati: II  TM distance: <3 FB  Neck ROM: full  No difficulty expected  Dental      Pulmonary - negative pulmonary ROS and normal exam    breath sounds clear to auscultation  Cardiovascular - normal exam    ECG reviewed  Rhythm: regular  Rate: normal    (+) hypertension, hyperlipidemia      Neuro/Psych- negative ROS  GI/Hepatic/Renal/Endo    (+) obesity    Musculoskeletal     Abdominal    Substance History - negative use     OB/GYN negative ob/gyn ROS         Other   arthritis,     ROS/Med Hx Other: HX HTN,ANXIETY. METS<4 DUE TO PAIN. SEEN DR. LOPEZ IN PAST LAST VISIT 2016. CATH 2004 - STATED SP[facility]. ELM K+ 6.1 8/19/24.K+ RECHECK 8/22/24 5.0.  KT                   Anesthesia Plan    ASA 2     general with block and ERAS Protocol     (Patient understands anesthesia not responsible for dental damage.    02/05/25 12:12  Sodium: 140  Potassium: 4.6  Chloride: 104  CO2: 27.6  Anion Gap: 8.4  BUN: 13  Creatinine: 0.66  BUN/Creatinine Ratio: 19.7  eGFR: 91.6  Glucose: 96  Calcium: 9.0  Alkaline Phosphatase: 111  Total Protein: 6.6  Albumin: 4.0  Globulin: 2.6  A/G Ratio: 1.5  AST (SGOT): 16  ALT (SGPT): 17  Total Bilirubin: 0.8  Hemoglobin A1C: 5.40  WBC: 6.58  RBC: 4.84  Hemoglobin: 14.0  Hematocrit: 43.6  Platelets: 195)  intravenous induction     Anesthetic plan, risks, benefits, and alternatives have been provided, discussed and informed consent has been obtained with: patient.  Pre-procedure education provided    CODE STATUS:

## 2025-02-10 NOTE — PLAN OF CARE
Goal Outcome Evaluation:  Plan of Care Reviewed With: (P) patient, spouse           Outcome Evaluation: (P) Pt presents with balance deficits and difficulty with ambulation. Pt requires skilled therapy services    Anticipated Discharge Disposition (PT): (P) home with outpatient therapy services

## 2025-02-10 NOTE — THERAPY EVALUATION
Acute Care - Physical Therapy Initial Evaluation   [place]     Patient Name: Katie Rudd  : 1950  MRN: [id number]  Today's Date: 2/10/2025      Visit Dx:     ICD-10-CM ICD-9-CM   1. Difficulty in walking  R26.2 719.7   2. Left knee pain, unspecified chronicity  M25.562 719.46   3. Primary osteoarthritis of left knee  M17.12 715.16     Patient Active Problem List   Diagnosis    Primary osteoarthritis of right knee    S/P TKR (total knee replacement)    Left knee pain    Primary osteoarthritis of left knee     Past Medical History:   Diagnosis Date    Anxiety     Arthritis     Bladder dysfunction     Hyperlipidemia     Hypertension     PONV (postoperative nausea and vomiting)      Past Surgical History:   Procedure Laterality Date    BREAST BIOPSY Left     CATARACT EXTRACTION      COLONOSCOPY N/A 2024    Procedure: COLONOSCOPY WITH COLD/HOT SNARE AND BIOPSY;  Surgeon: Giorgio Garcia MD;  Location: [facility] ENDOSCOPY;  Service: General;  Laterality: N/A;  DIVERTICULOSIS, COLON POLYPS, ANAL MUCOSA ABNORMALITY    HYSTERECTOMY      NECK SURGERY      CERVICAL FUSION    TONSILLECTOMY      TOTAL KNEE ARTHROPLASTY Right 2024    Procedure: RIGHT TOTAL KNEE ARTHROPLASTY.;  Surgeon: Glen Platt MD;  Location: [facility] MAIN OR;  Service: Orthopedics;  Laterality: Right;    TUBAL ABDOMINAL LIGATION       PT Assessment (Last 12 Hours)       PT Evaluation and Treatment       Row Name 02/10/25 1030          Physical Therapy Time and Intention    Subjective Information no complaints (P)   -TM     Document Type evaluation (P)   -TM     Mode of Treatment individual therapy (P)   -TM     Patient Effort good (P)   -TM       Row Name 02/10/25 1030          General Information    Prior Level of Function independent: (P)   -TM     Equipment Currently Used at Home none (P)   -TM     Existing Precautions/Restrictions fall (P)   -TM       Row Name 02/10/25 1030          Living Environment    Current Living  Arrangements home (P)   -TM     Home Accessibility stairs to enter home (P)   -TM     People in Home spouse (P)   -TM     Primary Care Provided by self;spouse/significant other (P)   -       Row Name 02/10/25 1030          Home Main Entrance    Number of Stairs, Main Entrance three (P)   -       Row Name 02/10/25 1030          Range of Motion (ROM)    Range of Motion ROM is WNL;bilateral lower extremities (P)   0-95 degrees left knee  -       Row Name 02/10/25 1030          Strength (Manual Muscle Testing)    Strength (Manual Muscle Testing) strength is WNL;bilateral lower extremities (P)   except left knee extension 4/5  -       Row Name 02/10/25 1030          Mobility    Extremity Weight-bearing Status left lower extremity (P)   -TM     Left Lower Extremity (Weight-bearing Status) weight-bearing as tolerated (WBAT) (P)   -       Row Name 02/10/25 1030          Bed Mobility    Bed Mobility bed mobility (all) activities (P)   -     All Activities, [place] (Bed Mobility) independent (P)   -       Row Name 02/10/25 1030          Transfers    Transfers bed-chair transfer (P)   -       Row Name 02/10/25 1030          Bed-Chair Transfer    Bed-Chair [place] (Transfers) contact guard (P)   -TM     Assistive Device (Bed-Chair Transfers) walker, front-wheeled (P)   -       Row Name 02/10/25 1030          Gait/Stairs (Locomotion)    Gait/Stairs Locomotion gait/ambulation independence;gait/ambulation assistive device (P)   -TM     [place] Level (Gait) contact guard (P)   -TM     Assistive Device (Gait) walker, front-wheeled (P)   -     Patient was able to Ambulate yes (P)   -TM     Distance in Feet (Gait) 10 (P)   -       Row Name 02/10/25 1030          Balance    Balance Assessment standing dynamic balance (P)   -     Dynamic Standing Balance contact guard (P)   -TM     Position/Device Used, Standing Balance walker, front-wheeled (P)   -       Row Name             [REMOVED] Wound  08/26/24 0740 Right anterior knee    Wound - Properties Group Placement Date: 08/26/24  -BW Placement Time: 0740  -BW Side: Right  -BW Orientation: anterior  -BW Location: knee  -BW Primary Wound Type: Incision  -BW Present on Original Admission: N  -BW Removal Date: 02/10/25  -SC Removal Time: 0725  -SC    Retired Wound - Properties Group Placement Date: 08/26/24  -BW Placement Time: 0740  -BW Present on Original Admission: N  -BW Side: Right  -BW Orientation: anterior  -BW Location: knee  -BW Primary Wound Type: Incision  -BW Removal Date: 02/10/25  -SC Removal Time: 0725  -SC    Retired Wound - Properties Group Placement Date: 08/26/24  -BW Placement Time: 0740  -BW Present on Original Admission: N  -BW Side: Right  -BW Orientation: anterior  -BW Location: knee  -BW Primary Wound Type: Incision  -BW Removal Date: 02/10/25  -SC Removal Time: 0725  -SC    Retired Wound - Properties Group Date first assessed: 08/26/24  -BW Time first assessed: 0740  -BW Present on Original Admission: N  -BW Side: Right  -BW Location: knee  -BW Primary Wound Type: Incision  -BW Resolution Date: 02/10/25  -SC Resolution Time: 0725  -SC      Row Name             Wound 02/10/25 0725 Left anterior knee    Wound - Properties Group Placement Date: 02/10/25  -SC Placement Time: 0725  -SC Side: Left  -SC Orientation: anterior  -SC Location: knee  -SC Primary Wound Type: Incision  -SC    Retired Wound - Properties Group Placement Date: 02/10/25  -SC Placement Time: 0725  -SC Side: Left  -SC Orientation: anterior  -SC Location: knee  -SC Primary Wound Type: Incision  -SC    Retired Wound - Properties Group Placement Date: 02/10/25  -SC Placement Time: 0725  -SC Side: Left  -SC Orientation: anterior  -SC Location: knee  -SC Primary Wound Type: Incision  -SC    Retired Wound - Properties Group Date first assessed: 02/10/25  -SC Time first assessed: 0725  -SC Side: Left  -SC Location: knee  -SC Primary Wound Type: Incision  -SC      Row Name  02/10/25 1030          Plan of Care Review    Plan of Care Reviewed With patient;spouse (P)   -TM     Outcome Evaluation Pt presents with balance deficits and difficulty with ambulation. Pt requires skilled therapy services (P)   -       Row Name 02/10/25 1030          Therapy Assessment/Plan (PT)    Patient/Family Therapy Goals Statement (PT) return home (P)   -TM     Rehab Potential (PT) good (P)   -TM     Criteria for Skilled Interventions Met (PT) skilled treatment is necessary (P)   -TM     Therapy Frequency (PT) 2 times/day (P)   -TM     Predicted Duration of Therapy Intervention (PT) 10 days (P)   -TM     Problem List (PT) problems related to;balance;mobility;strength;range of motion (ROM) (P)   -TM     Activity Limitations Related to Problem List (PT) unable to ambulate safely (P)   -TM       Row Name 02/10/25 1030          PT Evaluation Complexity    History, PT Evaluation Complexity no personal factors and/or comorbidities (P)   -TM     Examination of Body Systems (PT Eval Complexity) total of 4 or more elements (P)   -TM     Clinical Presentation (PT Evaluation Complexity) stable (P)   -TM     Clinical Decision Making (PT Evaluation Complexity) low complexity (P)   -TM     Overall Complexity (PT Evaluation Complexity) low complexity (P)   -TM       Row Name 02/10/25 1030          Therapy Plan Review/Discharge Plan (PT)    Therapy Plan Review (PT) evaluation/treatment results reviewed;spouse/significant other;patient (P)   -TM       Row Name 02/10/25 1030          Physical Therapy Goals    Transfer Goal Selection (PT) transfer, PT goal 1 (P)   -TM     Gait Training Goal Selection (PT) gait training, PT goal 1 (P)   -TM     ROM Goal Selection (PT) ROM, PT goal 1 (P)   -TM     Strength Goal Selection (PT) strength, PT goal 1 (P)   -TM     Balance Goal Selection (PT) balance, PT goal 1 (P)   -       Row Name 02/10/25 1030          Transfer Goal 1 (PT)    Activity/Assistive Device (Transfer Goal 1, PT)  bed-to-chair/chair-to-bed (P)   -TM     [place] Level/Cues Needed (Transfer Goal 1, PT) independent (P)   -TM     Time Frame (Transfer Goal 1, PT) 10 days (P)   -TM       Row Name 02/10/25 1030          Gait Training Goal 1 (PT)    Activity/Assistive Device (Gait Training Goal 1, PT) gait (walking locomotion);assistive device use (P)   -TM     [place] Level (Gait Training Goal 1, PT) independent (P)   -TM     Distance (Gait Training Goal 1, PT) 300' (P)   -TM     Time Frame (Gait Training Goal 1, PT) 10 days (P)   -TM       Row Name 02/10/25 1030          ROM Goal 1 (PT)    ROM Goal 1 (PT) 0-105 degrees right knee (P)   -TM     Time Frame (ROM Goal 1, PT) 10 days (P)   -TM       Row Name 02/10/25 1030          Strength Goal 1 (PT)    Strength Goal 1 (PT) right knee extension 5/5 (P)   -TM     Time Frame (Strength Goal 1, PT) 10 days (P)   -TM       Row Name 02/10/25 1030          Balance Goal 1 (PT)    Activity/Assistive Device (Balance Goal) standing dynamic balance (P)   -TM     [place] Level/Cues Needed (Balance Goal 1, PT) independent (P)   -TM     Time Frame (Balance Goal 1, PT) 2 weeks (P)   -TM               User Key  (r) = Recorded By, (t) = Taken By, (c) = Cosigned By      Initials Name Provider Type    SC Jazmyne Sotelo, RN Registered Nurse    Vaughn Tiwari RN Registered Nurse    Dick Correia, PT Student PT Student                    Physical Therapy Education       Title: PT OT SLP Therapies (Done)       Topic: Physical Therapy (Done)       Point: Mobility training (Done)       Learning Progress Summary            Patient Acceptance, E,TB, VU by TM at 2/10/2025 1046                      Point: Home exercise program (Done)       Learning Progress Summary            Patient Acceptance, E,TB, VU by TM at 2/10/2025 1046                      Point: Body mechanics (Done)       Learning Progress Summary            Patient Acceptance, E,TB, VU by TM at 2/10/2025 1046                       Point: Precautions (Done)       Learning Progress Summary            Patient Acceptance, E,TB, VU by TM at 2/10/2025 1046                                      User Key       Initials Effective Dates Name Provider Type Discipline     02/04/25 -  Dick Scott, PT Student PT Student PT                  PT Recommendation and Plan  Anticipated Discharge Disposition (PT): (P) home with outpatient therapy services  Planned Therapy Interventions (PT): (P) balance training, gait training, home exercise program, ROM (range of motion), strengthening, transfer training  Therapy Frequency (PT): (P) 2 times/day  Plan of Care Reviewed With: (P) patient, spouse  Outcome Evaluation: (P) Pt presents with balance deficits and difficulty with ambulation. Pt requires skilled therapy services   Outcome Measures       Row Name 02/10/25 1039             How much help from another person do you currently need...    Turning from your back to your side while in flat bed without using bedrails? 4 (P)   -TM      Moving from lying on back to sitting on the side of a flat bed without bedrails? 4 (P)   -TM      Moving to and from a bed to a chair (including a wheelchair)? 3 (P)   -TM      Standing up from a chair using your arms (e.g., wheelchair, bedside chair)? 3 (P)   -TM      Climbing 3-5 steps with a railing? 3 (P)   -TM      To walk in hospital room? 3 (P)   -TM      AM-PAC 6 Clicks Score (PT) 20 (P)   -TM         Functional Assessment    Outcome Measure Options AM-PAC 6 Clicks Basic Mobility (PT) (P)   -TM                User Key  (r) = Recorded By, (t) = Taken By, (c) = Cosigned By      Initials Name Provider Type    TM Dick Scott, PT Student PT Student                     Time Calculation:    PT Charges       Row Name 02/10/25 1038             Time Calculation    PT Received On 02/10/25 (P)   -TM      PT Goal Re-Cert Due Date 02/19/25 (P)   -TM         Untimed Charges    PT Eval/Re-eval Minutes 35 (P)   -TM         Total Minutes     Untimed Charges Total Minutes 35 (P)   -TM       Total Minutes 35 (P)   -TM                User Key  (r) = Recorded By, (t) = Taken By, (c) = Cosigned By      Initials Name Provider Type    TM Dick Scott, PT Student PT Student                  Therapy Charges for Today       Code Description Service Date Service Provider Modifiers Qty    [id number] HC PT EVAL LOW COMPLEXITY 3 2/10/2025 Dick Scott PT Student GP 1            PT G-Codes  Outcome Measure Options: (P) AM-PAC 6 Clicks Basic Mobility (PT)  AM-PAC 6 Clicks Score (PT): (P) 20    Dick Scott PT Student  2/10/2025

## 2025-02-10 NOTE — ANESTHESIA POSTPROCEDURE EVALUATION
Patient: Katie Rudd    Procedure Summary       Date: 02/10/25 Room / Location: [facility] OR 03 / [facility] MAIN OR    Anesthesia Start: 0711 Anesthesia Stop: 0842    Procedure: LEFT TOTAL KNEE ARTHROPLASTY (Left: Knee) Diagnosis:       Left knee pain, unspecified chronicity      Primary osteoarthritis of left knee      (Left knee pain, unspecified chronicity [M25.562])      (Primary osteoarthritis of left knee [M17.12])    Surgeons: Glen Platt MD Provider: Matthew Tam MD    Anesthesia Type: general with block, ERAS Protocol ASA Status: 2            Anesthesia Type: general with block, ERAS Protocol    Vitals  Vitals Value Taken Time   /90 02/10/25 0855   Temp 36.1 °C (97 °F) 02/10/25 0840   Pulse 78 02/10/25 0856   Resp 14 02/10/25 0845   SpO2 97 % 02/10/25 0856   Vitals shown include unfiled device data.        Post Anesthesia Care and Evaluation    Patient location during evaluation: bedside  Patient participation: complete - patient participated  Level of consciousness: awake    Airway patency: patent  PONV Status: none  Cardiovascular status: acceptable  Respiratory status: acceptable  Hydration status: acceptable

## 2025-02-10 NOTE — CONSULTS
[facility]   Hospitalist Consult Note  Date: 2/10/2025   Patient Name: Katie Rudd  : 1950  MRN: [id number]  Primary Care Physician:  Jesu Tierney MD  Referring Physician: RICARDO Means  Date of admission: 2/10/2025    Subjective   Subjective     Reason for Consult/ Chief Complaint: Medical management    HPI:  Katie Rudd is a 75 y.o. female past medical history significant for hypertension, hyperlipidemia, depression, bladder dysfunction, osteoarthritis with associated left knee pain failed respond to conservative medical management underwent total knee arthroplasty hospitalist contacted postoperatively for management of her chronic medical conditions.        Personal History     Past Medical History:  Past Medical History:   Diagnosis Date    Anxiety     Arthritis     Bladder dysfunction     Hyperlipidemia     Hypertension     PONV (postoperative nausea and vomiting)         Past Surgical History:  Past Surgical History:   Procedure Laterality Date    BREAST BIOPSY Left     CATARACT EXTRACTION      COLONOSCOPY N/A 2024    Procedure: COLONOSCOPY WITH COLD/HOT SNARE AND BIOPSY;  Surgeon: Giorgio Garcia MD;  Location: [facility] ENDOSCOPY;  Service: General;  Laterality: N/A;  DIVERTICULOSIS, COLON POLYPS, ANAL MUCOSA ABNORMALITY    HYSTERECTOMY      NECK SURGERY      CERVICAL FUSION    TONSILLECTOMY      TOTAL KNEE ARTHROPLASTY Right 2024    Procedure: RIGHT TOTAL KNEE ARTHROPLASTY.;  Surgeon: Glen Platt MD;  Location: [facility] MAIN OR;  Service: Orthopedics;  Laterality: Right;    TUBAL ABDOMINAL LIGATION           Family History:   Family History   Problem Relation Age of Onset    Breast cancer Mother     Malig Hyperthermia Neg Hx         Social History:   Social History     Socioeconomic History    Marital status:    Tobacco Use    Smoking status: Never    Smokeless tobacco: Never   Vaping Use    Vaping status: Never Used   Substance and  Sexual Activity    Alcohol use: Not Currently    Drug use: Never    Sexual activity: Defer        Home Medications:  Vibegron, amLODIPine, atorvastatin, and sertraline    Allergies:  No Known Allergies    Review of Systems   All systems were reviewed and negative except for: Weakness fatigue    Objective    Objective     Vitals:   Temp:  [96.8 °F (36 °C)-98.3 °F (36.8 °C)] 97.5 °F (36.4 °C)  Heart Rate:  [66-92] 71  Resp:  [11-18] 16  BP: ()/() 116/45  Flow (L/min) (Oxygen Therapy):  [2-4] 2    Physical Exam:  Constitutional: Awake alert oriented no acute distress  Respiratory: Clear  Cardiovascular RRR  GI: Abdomen soft nontender  Extremities neurovascularly intact    Result Review    Result Review:  I have personally reviewed the results from the time of this admission to 2/10/2025 15:04 EST and agree with these findings:  []  Laboratory  []  Microbiology  []  Radiology  []  EKG/Telemetry   []  Cardiology/Vascular   []  Pathology  []  Old records  []  Other:    Assessment & Plan   Assessment / Plan     Assessment:    Hypertension  Hyperlipidemia  Depression  Anxiety  Osteoarthritis    Plan:    Patient with history of hypertension however blood pressures on the low side hold Norvasc give IV fluids overnight  DC IV fluids in a.m.  \Resume Zoloft in the morning for depression anxiety  Resume Lipitor for hyperlipidemia  IV and oral analgesics  PT OT consultation  CBC chemistries in a.m. to assess for blood loss anemia and to evaluate electrolytes and renal function  Further treatment contingent upon her hospital course    VTE Prophylaxis:  Pharmacologic & mechanical VTE prophylaxis orders are present.        CODE STATUS:    Code Status (Patient has no pulse and is not breathing): CPR (Attempt to Resuscitate)  Medical Interventions (Patient has pulse or is breathing): Full Support      Electronically signed by CASANDRA Joe, 02/10/25, 3:04 PM EST.

## 2025-02-10 NOTE — THERAPY TREATMENT NOTE
Acute Care - Physical Therapy Initial Evaluation and Treatment Note   [place]     Patient Name: Katie Rudd  : 1950  MRN: [id number]  Today's Date: 2/10/2025      Visit Dx:     ICD-10-CM ICD-9-CM   1. Difficulty in walking  R26.2 719.7   2. Left knee pain, unspecified chronicity  M25.562 719.46   3. Primary osteoarthritis of left knee  M17.12 715.16     Patient Active Problem List   Diagnosis    Primary osteoarthritis of right knee    S/P TKR (total knee replacement)    Left knee pain    Primary osteoarthritis of left knee     Past Medical History:   Diagnosis Date    Anxiety     Arthritis     Bladder dysfunction     Hyperlipidemia     Hypertension     PONV (postoperative nausea and vomiting)      Past Surgical History:   Procedure Laterality Date    BREAST BIOPSY Left     CATARACT EXTRACTION      COLONOSCOPY N/A 2024    Procedure: COLONOSCOPY WITH COLD/HOT SNARE AND BIOPSY;  Surgeon: Giorgio Garcia MD;  Location: [facility] ENDOSCOPY;  Service: General;  Laterality: N/A;  DIVERTICULOSIS, COLON POLYPS, ANAL MUCOSA ABNORMALITY    HYSTERECTOMY      NECK SURGERY      CERVICAL FUSION    TONSILLECTOMY      TOTAL KNEE ARTHROPLASTY Right 2024    Procedure: RIGHT TOTAL KNEE ARTHROPLASTY.;  Surgeon: Glen Platt MD;  Location: [facility] MAIN OR;  Service: Orthopedics;  Laterality: Right;    TUBAL ABDOMINAL LIGATION       PT Assessment (Last 12 Hours)       PT Evaluation and Treatment       Row Name 02/10/25 1612 02/10/25 1030       Physical Therapy Time and Intention    Subjective Information complains of;pain  -SM no complaints  -CHRIS (r) TM (t) CHRIS (c)    Document Type therapy note (daily note)  -SM evaluation  -CHRIS (r) TM (t) CHRIS (c)    Mode of Treatment individual therapy;group therapy;physical therapy  -SM individual therapy  -CHRIS (r) TM (t) CHRIS (c)    Patient Effort good  -SM good  -CHRIS (r) TM (t) CHRIS (c)    Symptoms Noted During/After Treatment fatigue  -SM --    Comment Gait performed  individually; therapuetic exercises performed in a group session with 3 participants  - --      Row Name 02/10/25 161 02/10/25 1030       General Information    Patient Observations alert;cooperative;agree to therapy  - --    Prior Level of Function -- independent:  -CHRIS (r) TM (t) CHRIS (c)    Equipment Currently Used at Home -- none  -CHRIS (r) TM (t) CHRIS (c)    Existing Precautions/Restrictions -- fall  -CHRIS (r) TM (t) CHRIS (c)      Row Name 02/10/25 1030          Living Environment    Current Living Arrangements home  -CHRIS (r) TM (t) CHRIS (c)     Home Accessibility stairs to enter home  -CHRIS (r) TM (t) CHRIS (c)     People in Home spouse  -CHRIS (r) TM (t) CHRIS (c)     Primary Care Provided by self;spouse/significant other  -CHRIS (r) TM (t) CHRIS (c)       Row Name 02/10/25 1030          Home Main Entrance    Number of Stairs, Main Entrance three  -CHRIS (r) TM (t) CHRIS (c)       Row Name 02/10/25 1612 02/10/25 1030       Range of Motion (ROM)    Range of Motion --  0-95 deg L Knee  -SM ROM is WNL;bilateral lower extremities  0-95 degrees left knee  -CHRIS (r) TM (t) CHRIS (c)      [facility] Name 02/10/25 1030          Strength (Manual Muscle Testing)    Strength (Manual Muscle Testing) strength is WNL;bilateral lower extremities  except left knee extension 4/5  -CHRIS (r) TM (t) CHRIS (c)       Row Name 02/10/25 1612 02/10/25 1030       Mobility    Extremity Weight-bearing Status left lower extremity  -SM left lower extremity  -CHRIS (r) TM (t) CHRIS (c)    Left Lower Extremity (Weight-bearing Status) weight-bearing as tolerated (WBAT)  -SM weight-bearing as tolerated (WBAT)  -CHRIS (r) TM (t) CHRIS (c)      [facility] Name 02/10/25 1030          Bed Mobility    Bed Mobility bed mobility (all) activities  -CHRIS (r) TM (t) CHRIS (c)     All Activities, [place] (Bed Mobility) independent  -CHRIS (r) TM (t) CHRIS (c)       [place]anna Name 02/10/25 1612 02/10/25 1030       Transfers    Transfers sit-stand transfer;stand-sit transfer  - bed-chair transfer  -CHRIS scruggs) AIDEN dawson (t))      Tanna  Name 02/10/25 1030          Bed-Chair Transfer    Bed-Chair [place] (Transfers) contact guard  -CHRIS (r) TM (t) CHRIS (c)     Assistive Device (Bed-Chair Transfers) walker, front-wheeled  -CHRIS (r) TM (t) CHRIS (c)       Row Name 02/10/25 1612          Sit-Stand Transfer    Sit-Stand [place] (Transfers) contact guard  -     Assistive Device (Sit-Stand Transfers) walker, front-wheeled  -       Row Name 02/10/25 1612          Stand-Sit Transfer    Stand-Sit [place] (Transfers) contact guard  -     Assistive Device (Stand-Sit Transfers) walker, front-wheeled  -       Row Name 02/10/25 1612 02/10/25 1030       Gait/Stairs (Locomotion)    Gait/Stairs Locomotion gait/ambulation assistive device  - gait/ambulation independence;gait/ambulation assistive device  -CHRIS (r) TM (t) CHRIS (c)    [place] Level (Gait) contact guard  - contact guard  -CHRIS (r) TM (t) CHRIS (c)    Assistive Device (Gait) walker, front-wheeled  - walker, front-wheeled  -CHRIS (r) TM (t) CHRIS (c)    Patient was able to Ambulate yes  - yes  -CHRIS (r) TM (t) CHRIS (c)    Distance in Feet (Gait) 165  x2  - 10  -CHRIS (r) TM (t) CHRIS (c)    Pattern (Gait) 3-point;step-through  - --    Deviations/Abnormal Patterns (Gait) antalgic;gait speed decreased  - --      Row Name 02/10/25 1612          Safety Issues/Impairments Affecting Functional Mobility    Impairments Affecting Function (Mobility) balance;pain;range of motion (ROM);strength  -       Row Name 02/10/25 1612 02/10/25 1030       Balance    Balance Assessment standing dynamic balance  - standing dynamic balance  -CHRIS (r) TM (t) CHRIS (c)    Dynamic Standing Balance contact guard  - contact guard  -CHRIS (r) TM (t) CHRIS (c)    Position/Device Used, Standing Balance walker, front-wheeled  - walker, front-wheeled  -CHRIS (r) TM (t) CHRIS (c)      Row Name 02/10/25 1612          Motor Skills    Therapeutic Exercise hip;knee;ankle  -       Row Name 02/10/25 1612          Hip (Therapeutic Exercise)     Hip (Therapeutic Exercise) isometric exercises  -     Hip Isometrics (Therapeutic Exercise) left;gluteal sets;10 repetitions;2 sets  -       Row Name 02/10/25 1612          Knee (Therapeutic Exercise)    Knee (Therapeutic Exercise) strengthening exercise;isometric exercises  -     Knee Isometrics (Therapeutic Exercise) left;quad sets;10 repetitions;2 sets  -     Knee Strengthening (Therapeutic Exercise) left;heel slides;SLR (straight leg raise);SAQ (short arc quad);LAQ (long arc quad);10 repetitions;2 sets  -       Row Name 02/10/25 1612          Ankle (Therapeutic Exercise)    Ankle (Therapeutic Exercise) AROM (active range of motion)  -     Ankle AROM (Therapeutic Exercise) left;dorsiflexion;plantarflexion;10 repetitions;2 sets  -       Row Name             [REMOVED] Wound 08/26/24 0740 Right anterior knee    Wound - Properties Group Placement Date: 08/26/24  -BW Placement Time: 0740  -BW Side: Right  -BW Orientation: anterior  -BW Location: knee  -BW Primary Wound Type: Incision  -BW Present on Original Admission: N  -BW Removal Date: 02/10/25  -SC Removal Time: 0725 -SC    Retired Wound - Properties Group Placement Date: 08/26/24  -BW Placement Time: 0740  -BW Present on Original Admission: N  -BW Side: Right  -BW Orientation: anterior  -BW Location: knee  -BW Primary Wound Type: Incision  -BW Removal Date: 02/10/25  -SC Removal Time: 0725 -SC    Retired Wound - Properties Group Placement Date: 08/26/24  -BW Placement Time: 0740  -BW Present on Original Admission: N  -BW Side: Right  -BW Orientation: anterior  -BW Location: knee  -BW Primary Wound Type: Incision  -BW Removal Date: 02/10/25  -SC Removal Time: 0725 -SC    Retired Wound - Properties Group Date first assessed: 08/26/24  -BW Time first assessed: 0740  -BW Present on Original Admission: N  -BW Side: Right  -BW Location: knee  -BW Primary Wound Type: Incision  -BW Resolution Date: 02/10/25  -SC Resolution Time: 0725  -SC      Row Name              Wound 02/10/25 0725 Left anterior knee    Wound - Properties Group Placement Date: 02/10/25  -SC Placement Time: 0725 -SC Side: Left  -SC Orientation: anterior  -SC Location: knee  -SC Primary Wound Type: Incision  -SC    Retired Wound - Properties Group Placement Date: 02/10/25  -SC Placement Time: 0725 -SC Side: Left  -SC Orientation: anterior  -SC Location: knee  -SC Primary Wound Type: Incision  -SC    Retired Wound - Properties Group Placement Date: 02/10/25  -SC Placement Time: 0725 -SC Side: Left  -SC Orientation: anterior  -SC Location: knee  -SC Primary Wound Type: Incision  -SC    Retired Wound - Properties Group Date first assessed: 02/10/25  -SC Time first assessed: 0725 -SC Side: Left  -SC Location: knee  -SC Primary Wound Type: Incision  -SC      Row Name 02/10/25 1030          Plan of Care Review    Plan of Care Reviewed With patient;spouse  -CHRIS (r) AIDEN (t) CHRIS (c)     Outcome Evaluation Pt presents with balance deficits and difficulty with ambulation. Pt requires skilled therapy services  -CHRIS (r) AIDEN (t) CHRIS (c)       Row Name 02/10/25 1612          Positioning and Restraints    Pre-Treatment Position sitting in chair/recliner  -     Post Treatment Position bathroom  -     Bathroom sitting;call light within reach;encouraged to call for assist;with ns  -       Row Name 02/10/25 1030          Therapy Assessment/Plan (PT)    Patient/Family Therapy Goals Statement (PT) return home  -CHRIS (r) TM (t) CHRIS (c)     Rehab Potential (PT) good  -CHRIS (r) TM (t) CHRIS (c)     Criteria for Skilled Interventions Met (PT) skilled treatment is necessary  -CHRIS (r) AIDEN (t) CHRIS (c)     Therapy Frequency (PT) 2 times/day  -CHRIS (r) TM (t) CHRIS (c)     Predicted Duration of Therapy Intervention (PT) 10 days  -CHRIS (r) TM (t) CHRIS (c)     Problem List (PT) problems related to;balance;mobility;strength;range of motion (ROM)  -CHRIS (r) TM (t) CHRIS (c)     Activity Limitations Related to Problem List (PT) unable to ambulate  safely  -CHRIS (r) TM (t) CHRIS (c)       Row Name 02/10/25 1030          PT Evaluation Complexity    History, PT Evaluation Complexity no personal factors and/or comorbidities  -CHRIS (r) TM (t) CHRIS (c)     Examination of Body Systems (PT Eval Complexity) total of 4 or more elements  -CHRIS (r) TM (t) CHRIS (c)     Clinical Presentation (PT Evaluation Complexity) stable  -CHRIS (r) TM (t) CHRIS (c)     Clinical Decision Making (PT Evaluation Complexity) low complexity  -CHRIS (r) TM (t) CHRIS (c)     Overall Complexity (PT Evaluation Complexity) low complexity  -CHRIS (r) TM (t)       Row Name 02/10/25 1612          Progress Summary (PT)    Progress Toward Functional Goals (PT) progress toward functional goals is good  -     Daily Progress Summary (PT) Pt is progressing well with their exercise program.  Will continue current plan of care.  -       Row Name 02/10/25 1030          Therapy Plan Review/Discharge Plan (PT)    Therapy Plan Review (PT) evaluation/treatment results reviewed;spouse/significant other;patient  -CHRIS (r) TM (t) CHRIS (c)       Row Name 02/10/25 1030          Physical Therapy Goals    Transfer Goal Selection (PT) transfer, PT goal 1  -CHRIS (r) TM (t) CHRIS (c)     Gait Training Goal Selection (PT) gait training, PT goal 1  -CHRIS (r) TM (t) CHRIS (c)     ROM Goal Selection (PT) ROM, PT goal 1  -CHRIS (r) TM (t) CHRIS (c)     Strength Goal Selection (PT) strength, PT goal 1  -CHRIS (r) TM (t) CHRIS (c)     Balance Goal Selection (PT) balance, PT goal 1  -CHRIS (r) TM (t) CHRIS (c)       Row Name 02/10/25 1030          Transfer Goal 1 (PT)    Activity/Assistive Device (Transfer Goal 1, PT) bed-to-chair/chair-to-bed  -CHRIS (r) TM (t) CHRIS (c)     [place] Level/Cues Needed (Transfer Goal 1, PT) independent  -CHRIS (r) TM (t) CHRIS (c)     Time Frame (Transfer Goal 1, PT) 10 days  -CHRIS (r) TM (t) CHRIS (c)       Row Name 02/10/25 5030          Gait Training Goal 1 (PT)    Activity/Assistive Device (Gait Training Goal 1, PT) gait (walking locomotion);assistive device  use  -CHRIS (r) TM (t) CHRIS (c)     [place] Level (Gait Training Goal 1, PT) independent  -CHRIS (r) TM (t) CHRIS (c)     Distance (Gait Training Goal 1, PT) 300'  -CHRIS (r) TM (t) CHRIS (c)     Time Frame (Gait Training Goal 1, PT) 10 days  -CHRIS (r) TM (t) CHRIS (c)       Row Name 02/10/25 1030          ROM Goal 1 (PT)    ROM Goal 1 (PT) 0-105 degrees right knee  -CHRIS (r) TM (t) CHRIS (c)     Time Frame (ROM Goal 1, PT) 10 days  -CHRIS (r) TM (t) CHRIS (c)       Row Name 02/10/25 1030          Strength Goal 1 (PT)    Strength Goal 1 (PT) right knee extension 5/5  -CHRIS (r) TM (t) CHRIS (c)     Time Frame (Strength Goal 1, PT) 10 days  -CHRIS (r) TM (t) CHRIS (c)       Row Name 02/10/25 1030          Balance Goal 1 (PT)    Activity/Assistive Device (Balance Goal) standing dynamic balance  -CHRIS (r) TM (t) CHRIS (c)     [place] Level/Cues Needed (Balance Goal 1, PT) independent  -CHRIS (r) TM (t) CHRIS (c)     Time Frame (Balance Goal 1, PT) 2 weeks  -CHRIS (r) TM (t) CHRIS (c)               User Key  (r) = Recorded By, (t) = Taken By, (c) = Cosigned By      Initials Name Provider Type    Jazmyne Jefferson, RN Registered Nurse    Vaughn Tiwari RN Registered Nurse    Michael Zhou, PT Physical Therapist    Virginia Paul, NICOLE Physical Therapist Assistant    TM Dick Scott, DOMI Student PT Student                    Physical Therapy Education       Title: PT OT SLP Therapies (Done)       Topic: Physical Therapy (Done)       Point: Mobility training (Done)       Learning Progress Summary            Patient Acceptance, E,TB, VU by TM at 2/10/2025 1046                      Point: Home exercise program (Done)       Learning Progress Summary            Patient Acceptance, E,TB, VU by TM at 2/10/2025 1046                      Point: Body mechanics (Done)       Learning Progress Summary            Patient Acceptance, E,TB, VU by TM at 2/10/2025 1046                      Point: Precautions (Done)       Learning Progress Summary            Patient  Acceptance, E,TB, VU by  at 2/10/2025 1046                                      User Key       Initials Effective Dates Name Provider Type Discipline     02/04/25 -  Dick Scott, PT Student PT Student PT                  PT Recommendation and Plan     Progress Summary (PT)  Progress Toward Functional Goals (PT): progress toward functional goals is good  Daily Progress Summary (PT): Pt is progressing well with their exercise program.  Will continue current plan of care.   Outcome Measures       Row Name 02/10/25 1614 02/10/25 1039          How much help from another person do you currently need...    Turning from your back to your side while in flat bed without using bedrails? 4  - 4  -CHRIS (r) TM (t) CHRIS (c)     Moving from lying on back to sitting on the side of a flat bed without bedrails? 4  - 4  -CHRIS (r) TM (t) CHRIS (c)     Moving to and from a bed to a chair (including a wheelchair)? 4  - 3  -CHRIS (r) TM (t) CHRIS (c)     Standing up from a chair using your arms (e.g., wheelchair, bedside chair)? 4  - 3  -CHRIS (r) TM (t) CHRIS (c)     Climbing 3-5 steps with a railing? 3  - 3  -CHRIS (r) TM (t) CHRIS (c)     To walk in hospital room? 3  - 3  -CHRIS (r) TM (t) CHRIS (c)     AM-PAC 6 Clicks Score (PT) 22  - 20  -CHRIS (r) TM (t)        Functional Assessment    Outcome Measure Options -- AM-PAC 6 Clicks Basic Mobility (PT)  -CHRIS (r) TM (t) CHRIS (c)               User Key  (r) = Recorded By, (t) = Taken By, (c) = Cosigned By      Initials Name Provider Type    CHRIS Michael Mckenzie, PT Physical Therapist     Virginia Hays, PTA Physical Therapist Assistant     Dick Scott, PT Student PT Student                     Time Calculation:    PT Charges       Row Name 02/10/25 1612 02/10/25 1038          Time Calculation    PT Received On 02/10/25  - 02/10/25  -CHRIS (r) TM (t) CHRIS (c)     PT Goal Re-Cert Due Date -- 02/19/25  -CHRIS (r) AIDEN (t) CHRIS (c)        Timed Charges    17560 - Gait Training Minutes  15  -SM --     48299 - PT  Therapeutic Activity Minutes 8  -SM --        Untimed Charges    PT Eval/Re-eval Minutes -- 35  -CHRIS (r) TM (t) CHRIS (c)     PT Group Therapy Minutes 30  -SM --        Total Minutes    Timed Charges Total Minutes 23  -SM --     Untimed Charges Total Minutes 30  -SM 35  -CHRIS (r) TM (t)      Total Minutes 53  -SM 35  -CHRIS (r) TM (t)               User Key  (r) = Recorded By, (t) = Taken By, (c) = Cosigned By      Initials Name Provider Type    Michael Zhou, PT Physical Therapist    Virginia Paul, PTA Physical Therapist Assistant    Dick Correia, PT Student PT Student                      PT G-Codes  Outcome Measure Options: AM-PAC 6 Clicks Basic Mobility (PT)  AM-PAC 6 Clicks Score (PT): 22    Virginia Hays PTA  2/10/2025

## 2025-02-10 NOTE — PLAN OF CARE
Goal Outcome Evaluation:  Plan of Care Reviewed With: patient        Progress: no change  Outcome Evaluation: Pt is A & O x4. Pt had a Left total knee done today by doctor Platt. Pt was able to walk from the stretcher to the chair. Pt is a x1 assist with a gait belt and a walker. Pt was able to work with PT today. Pt was able to walk 330 feet. Pt has had no issues with voiding. Pt's pain has been controlled with PRN pain medication throughout my shift. Pt should be able to discharge home tomorrow.

## 2025-02-10 NOTE — ANESTHESIA PROCEDURE NOTES
Peripheral Block      Patient reassessed immediately prior to procedure    Patient location during procedure: pre-op  Start time: 2/10/2025 7:01 AM  Stop time: 2/10/2025 7:05 AM  Reason for block: at surgeon's request and post-op pain management  Performed by  Anesthesiologist: Matthew Tam MD  Preanesthetic Checklist  Completed: patient identified, IV checked, site marked, risks and benefits discussed, surgical consent, monitors and equipment checked, pre-op evaluation and timeout performed  Prep:  Pt Position: supine  Sterile barriers:alcohol skin prep, partial drape, cap, washed/disinfected hands, mask and gloves  Prep: ChloraPrep  Patient monitoring: blood pressure monitoring, continuous pulse oximetry and EKG  Procedure    Sedation: yes  Performed under: local infiltration  Guidance:ultrasound guided and nerve stimulator    ULTRASOUND INTERPRETATION.  Using ultrasound guidance a 20 G gauge needle was placed in close proximity to the nerve, at which point, under ultrasound guidance anesthetic was injected in the area of the nerve and spread of the anesthesia was seen on ultrasound in close proximity thereto.  There were no abnormalities seen on ultrasound; a digital image was taken; and the patient tolerated the procedure with no complications. Images:still images obtained, printed/placed on chart    Laterality:left  Block Type:adductor canal block  Injection Technique:single-shot  Needle Type:echogenic  Needle Gauge:20 G (4in)  Resistance on Injection: none    Medications Used: bupivacaine-EPINEPHrine PF (MARCAINE w/EPI) 0.5% -1:[id number] injection - Injection   30 mL - 2/10/2025 7:05:00 AM      Post Assessment  Injection Assessment: negative aspiration for heme, no paresthesia on injection and incremental injection  Patient Tolerance:comfortable throughout block  Complications:no  Additional Notes  The block or continuous infusion is requested by the referring physician for management of postoperative pain,  or pain related to a procedure. Ultrasound guidance (deemed medically necessary). Painless injection, pt was awake and conversant during the procedure without complications. Needle and surrounding structures visualized throughout procedure. No adverse reactions or complications seen during this period. Post-procedure image showed no signs of complication, and anatomy was consistent with an uncomplicated nerve blockade.  Performed by: Matthew Tam MD

## 2025-02-11 VITALS
SYSTOLIC BLOOD PRESSURE: 116 MMHG | WEIGHT: 188.93 LBS | BODY MASS INDEX: 30.36 KG/M2 | DIASTOLIC BLOOD PRESSURE: 50 MMHG | TEMPERATURE: 98.6 F | OXYGEN SATURATION: 95 % | HEIGHT: 66 IN | HEART RATE: 71 BPM | RESPIRATION RATE: 16 BRPM

## 2025-02-11 LAB
ALBUMIN SERPL-MCNC: 3.1 G/DL (ref 3.5–5.2)
ANION GAP SERPL CALCULATED.3IONS-SCNC: 6.9 MMOL/L (ref 5–15)
BUN SERPL-MCNC: 17 MG/DL (ref 8–23)
BUN/CREAT SERPL: 19.3 (ref 7–25)
CALCIUM SPEC-SCNC: 8.6 MG/DL (ref 8.6–10.5)
CHLORIDE SERPL-SCNC: 104 MMOL/L (ref 98–107)
CO2 SERPL-SCNC: 26.1 MMOL/L (ref 22–29)
CREAT SERPL-MCNC: 0.88 MG/DL (ref 0.57–1)
DEPRECATED RDW RBC AUTO: 49.2 FL (ref 37–54)
EGFRCR SERPLBLD CKD-EPI 2021: 68.6 ML/MIN/1.73
ERYTHROCYTE [DISTWIDTH] IN BLOOD BY AUTOMATED COUNT: 14.3 % (ref 12.3–15.4)
GLUCOSE SERPL-MCNC: 107 MG/DL (ref 65–99)
HCT VFR BLD AUTO: 35.1 % (ref 34–46.6)
HGB BLD-MCNC: 11 G/DL (ref 12–15.9)
MCH RBC QN AUTO: 29.2 PG (ref 26.6–33)
MCHC RBC AUTO-ENTMCNC: 31.3 G/DL (ref 31.5–35.7)
MCV RBC AUTO: 93.1 FL (ref 79–97)
PHOSPHATE SERPL-MCNC: 3.7 MG/DL (ref 2.5–4.5)
PLATELET # BLD AUTO: 171 10*3/MM3 (ref 140–450)
PMV BLD AUTO: 9.9 FL (ref 6–12)
POTASSIUM SERPL-SCNC: 4.3 MMOL/L (ref 3.5–5.2)
RBC # BLD AUTO: 3.77 10*6/MM3 (ref 3.77–5.28)
SODIUM SERPL-SCNC: 137 MMOL/L (ref 136–145)
WBC NRBC COR # BLD AUTO: 8.67 10*3/MM3 (ref 3.4–10.8)

## 2025-02-11 PROCEDURE — 25010000002 KETOROLAC TROMETHAMINE PER 15 MG: Performed by: ORTHOPAEDIC SURGERY

## 2025-02-11 PROCEDURE — A9270 NON-COVERED ITEM OR SERVICE: HCPCS | Performed by: ORTHOPAEDIC SURGERY

## 2025-02-11 PROCEDURE — 97535 SELF CARE MNGMENT TRAINING: CPT

## 2025-02-11 PROCEDURE — A9270 NON-COVERED ITEM OR SERVICE: HCPCS | Performed by: PHYSICIAN ASSISTANT

## 2025-02-11 PROCEDURE — 63710000001 SENNOSIDES-DOCUSATE 8.6-50 MG TABLET: Performed by: ORTHOPAEDIC SURGERY

## 2025-02-11 PROCEDURE — 63710000001 SERTRALINE 50 MG TABLET: Performed by: PHYSICIAN ASSISTANT

## 2025-02-11 PROCEDURE — 63710000001 HYDROCODONE-ACETAMINOPHEN 7.5-325 MG TABLET: Performed by: ORTHOPAEDIC SURGERY

## 2025-02-11 PROCEDURE — 63710000001 FERROUS SULFATE 325 (65 FE) MG TABLET: Performed by: ORTHOPAEDIC SURGERY

## 2025-02-11 PROCEDURE — 97165 OT EVAL LOW COMPLEX 30 MIN: CPT

## 2025-02-11 PROCEDURE — 25010000002 ENOXAPARIN PER 10 MG: Performed by: ORTHOPAEDIC SURGERY

## 2025-02-11 PROCEDURE — 80069 RENAL FUNCTION PANEL: CPT | Performed by: PHYSICIAN ASSISTANT

## 2025-02-11 PROCEDURE — 97150 GROUP THERAPEUTIC PROCEDURES: CPT

## 2025-02-11 PROCEDURE — 85027 COMPLETE CBC AUTOMATED: CPT | Performed by: ORTHOPAEDIC SURGERY

## 2025-02-11 PROCEDURE — 97116 GAIT TRAINING THERAPY: CPT

## 2025-02-11 RX ORDER — HYDROCODONE BITARTRATE AND ACETAMINOPHEN 7.5; 325 MG/1; MG/1
1-2 TABLET ORAL EVERY 4 HOURS PRN
Qty: 40 TABLET | Refills: 0 | Status: SHIPPED | OUTPATIENT
Start: 2025-02-11

## 2025-02-11 RX ADMIN — FERROUS SULFATE TAB 325 MG (65 MG ELEMENTAL FE) 325 MG: 325 (65 FE) TAB at 08:06

## 2025-02-11 RX ADMIN — ENOXAPARIN SODIUM 40 MG: 100 INJECTION SUBCUTANEOUS at 08:06

## 2025-02-11 RX ADMIN — SENNOSIDES AND DOCUSATE SODIUM 2 TABLET: 50; 8.6 TABLET ORAL at 08:06

## 2025-02-11 RX ADMIN — KETOROLAC TROMETHAMINE 15 MG: 15 INJECTION, SOLUTION INTRAMUSCULAR; INTRAVENOUS at 05:08

## 2025-02-11 RX ADMIN — SERTRALINE HYDROCHLORIDE 50 MG: 50 TABLET ORAL at 08:06

## 2025-02-11 RX ADMIN — HYDROCODONE BITARTRATE AND ACETAMINOPHEN 2 TABLET: 7.5; 325 TABLET ORAL at 08:06

## 2025-02-11 NOTE — PROGRESS NOTES
Orthopedic Total Knee Progress Note    Assessment/Plan: Outpatient PT in [place], f/u 2 weeks, dvt prophylaxis      Status post-left total knee arthroplasty: Doing well postoperatively.    Pain Relief: some relief    Continues current post-op course    Activity: up with assistance    Weight Bearing: WBAT     LOS: 0 days     Subjective     Post-Operative Day: 1 post-left total knee arthroplasty  Systemic or Specific Complaints: No Complaints    Objective     Vital signs in last 24 hours:  Vitals:    02/10/25 1320 02/10/25 1900 02/10/25 2246 02/11/25 0326   BP: 116/45 118/54 117/49 112/59   BP Location: Left arm Left arm Left arm Left arm   Patient Position: Sitting Sitting Sitting Sitting   Pulse: 71 73 59 60   Resp: 16 16 16 16   Temp: 97.5 °F (36.4 °C) 98.1 °F (36.7 °C) 97.7 °F (36.5 °C) 97.7 °F (36.5 °C)   TempSrc: Oral Oral Oral Oral   SpO2: 97% 93% 94% 97%   Weight:       Height:            General: alert, appears stated age, and cooperative   Neurovascular: Tibial nerve: Intact, Superficial peroneal nerve: Intact, and Deep peroneal nerve: Intact  Capillary refill: Normal   Wound: Wound clean and dry no evidence of infection.   Range of Motion: Limited flexsion and Limited extension   DVT Exam: No evidence of DVT seen on physical exam.      WBC   Date Value Ref Range Status   02/11/2025 8.67 3.40 - 10.80 10*3/mm3 Final     RBC   Date Value Ref Range Status   02/11/2025 3.77 3.77 - 5.28 10*6/mm3 Final     Hemoglobin   Date Value Ref Range Status   02/11/2025 11.0 (L) 12.0 - 15.9 g/dL Final     Hematocrit   Date Value Ref Range Status   02/11/2025 35.1 34.0 - 46.6 % Final     MCV   Date Value Ref Range Status   02/11/2025 93.1 79.0 - 97.0 fL Final     MCH   Date Value Ref Range Status   02/11/2025 29.2 26.6 - 33.0 pg Final     MCHC   Date Value Ref Range Status   02/11/2025 31.3 (L) 31.5 - 35.7 g/dL Final     RDW   Date Value Ref Range Status   02/11/2025 14.3 12.3 - 15.4 % Final     RDW-SD   Date Value Ref  "Range Status   02/11/2025 49.2 37.0 - 54.0 fl Final     MPV   Date Value Ref Range Status   02/11/2025 9.9 6.0 - 12.0 fL Final     Platelets   Date Value Ref Range Status   02/11/2025 171 140 - 450 10*3/mm3 Final        Basic Metabolic Panel    Sodium Sodium   Date Value Ref Range Status   02/11/2025 137 136 - 145 mmol/L Final      Potassium Potassium   Date Value Ref Range Status   02/11/2025 4.3 3.5 - 5.2 mmol/L Final      Chloride Chloride   Date Value Ref Range Status   02/11/2025 104 98 - 107 mmol/L Final      Bicarbonate No results found for: \"PLASMABICARB\"   BUN BUN   Date Value Ref Range Status   02/11/2025 17 8 - 23 mg/dL Final      Creatinine Creatinine   Date Value Ref Range Status   02/11/2025 0.88 0.57 - 1.00 mg/dL Final      Calcium Calcium   Date Value Ref Range Status   02/11/2025 8.6 8.6 - 10.5 mg/dL Final      Glucose      No components found for: \"GLUCOSE.*\"      XR Knee 1 or 2 View Left   Final Result   Impression:   1.Status post left knee arthroplasty.   2.No immediate postoperative complication is identified.               Electronically Signed: Ritesh Bullock     2/10/2025 8:57 AM EST     Workstation ID: [id number]          "

## 2025-02-11 NOTE — THERAPY EVALUATION
Patient Name: Katie Rudd  : 1950    MRN: [id number]                              Today's Date: 2025       Admit Date: 2/10/2025    Visit Dx:     ICD-10-CM ICD-9-CM   1. Difficulty in walking  R26.2 719.7   2. Left knee pain, unspecified chronicity  M25.562 719.46   3. Primary osteoarthritis of left knee  M17.12 715.16   4. Decreased activities of daily living (ADL)  Z78.9 V49.89     Patient Active Problem List   Diagnosis    Primary osteoarthritis of right knee    S/P TKR (total knee replacement)    Left knee pain    Primary osteoarthritis of left knee     Past Medical History:   Diagnosis Date    Anxiety     Arthritis     Bladder dysfunction     Hyperlipidemia     Hypertension     PONV (postoperative nausea and vomiting)      Past Surgical History:   Procedure Laterality Date    BREAST BIOPSY Left     CATARACT EXTRACTION      COLONOSCOPY N/A 2024    Procedure: COLONOSCOPY WITH COLD/HOT SNARE AND BIOPSY;  Surgeon: Giorgio Garcia MD;  Location: [facility] ENDOSCOPY;  Service: General;  Laterality: N/A;  DIVERTICULOSIS, COLON POLYPS, ANAL MUCOSA ABNORMALITY    HYSTERECTOMY      NECK SURGERY      CERVICAL FUSION    TONSILLECTOMY      TOTAL KNEE ARTHROPLASTY Right 2024    Procedure: RIGHT TOTAL KNEE ARTHROPLASTY.;  Surgeon: Glen Platt MD;  Location: [facility] MAIN OR;  Service: Orthopedics;  Laterality: Right;    TOTAL KNEE ARTHROPLASTY Left 2/10/2025    Procedure: LEFT TOTAL KNEE ARTHROPLASTY;  Surgeon: Glen Platt MD;  Location: [facility] MAIN OR;  Service: Orthopedics;  Laterality: Left;    TUBAL ABDOMINAL LIGATION        General Information       Row Name 25 1034 25 1021       OT Time and Intention    Document Type therapy note (daily note)  -AV evaluation  -AV    Mode of Treatment individual therapy;occupational therapy  -AV individual therapy;occupational therapy  -AV    Patient Effort -- good  -AV      Row Name 25 1034 25 1021       General  Information    Patient Profile Reviewed -- yes  -AV    Prior Level of Function -- independent:;ADL's;transfer;all household mobility  stands to shower (tub). stands at sink to groom. elevated commode. ambulates without assistive device. no home O2.  -AV    Existing Precautions/Restrictions -- fall  WBAT  -AV    Barriers to Rehab --  none: motivated  -AV none identified  -AV      Row Name 02/11/25 1021          Occupational Profile    Reason for Services/Referral (Occupational Profile) Patient is a 75 year old female admitted to [facility] on 2/10/25 with left knee pain. She is currently post-op day #1: total knee replacement, on 2N/ room air. OT consulted due to recent decline in ADL/ transfer independence. No previous OT services for current condition.  -AV       Row Name 02/11/25 1021          Living Environment    People in Home spouse  -AV       Row Name 02/11/25 1021          Home Main Entrance    Number of Stairs, Main Entrance one  back door  -AV       Row Name 02/11/25 1021          Stairs Within Home, Primary    Number of Stairs, Within Home, Primary none  -AV       Row Name 02/11/25 1034 02/11/25 1021       Cognition    Orientation Status (Cognition) --  receptive to cues for safe positioning/ adaptive techniques throughout functional ADL session.  -AV --  alert, pleasant and cooperative. able to retain information and follow commands.  -AV      Row Name 02/11/25 1021          Safety Issues/Impairments Affecting Functional Mobility    Impairments Affecting Function (Mobility) balance;endurance/activity tolerance  -AV               User Key  (r) = Recorded By, (t) = Taken By, (c) = Cosigned By      Initials Name Provider Type    AV Bart Nelson, OT Occupational Therapist                     Mobility/ADL's       Row Name 02/11/25 1034 02/11/25 1023       Transfers    Transfers sit-stand transfer;bed-chair transfer;toilet transfer  -AV --    Comment, (Transfers) -- SBA/RW  -AV      Row Name  02/11/25 1034          Bed-Chair Transfer    Bed-Chair [place] (Transfers) standby assist;verbal cues  -AV     Assistive Device (Bed-Chair Transfers) walker, front-wheeled  -AV       Row Name 02/11/25 1034          Sit-Stand Transfer    Sit-Stand [place] (Transfers) standby assist;verbal cues;nonverbal cues (demo/gesture)  -AV     Assistive Device (Sit-Stand Transfers) walker, front-wheeled  -AV     Comment, (Sit-Stand Transfer) x7 during functional ADL session  -AV       Row Name 02/11/25 1034          Toilet Transfer    [place] Level (Toilet Transfer) standby assist;verbal cues;nonverbal cues (demo/gesture)  -AV     Assistive Device (Toilet Transfer) raised toilet seat;grab bars/safety frame;walker, front-wheeled  -AV       Row Name 02/11/25 1034          Functional Mobility    Functional Mobility- Comment ambulated to/from bathroom SBA/RW with minimal cues for safe technique  -AV       Row Name 02/11/25 1034 02/11/25 1023       Activities of Daily Living    BADL Assessment/Intervention lower body dressing;bathing  (I) UB bathing/dressing w setup while seated. Min assist wash left foot only. SBA don/doff pants. (I) don/doff socks and don slipon shoes. SBA toilet hygiene (elevated commode). cues for safe positioning/ adaptive tech throughout functional ADL session.  -AV --  Independent feeding, grooming and upper body bathing/ dressing with setup while seated. Min assist lower body bathing. SBA lower body dressing with increased time requirement. SBA toilet hygiene (elevated commode).  -AV      Row Name 02/11/25 1023          Mobility    Extremity Weight-bearing Status left lower extremity  -AV     Left Lower Extremity (Weight-bearing Status) weight-bearing as tolerated (WBAT)  -AV       Row Name 02/11/25 1034          Lower Body Dressing Assessment/Training    Comment, (Lower Body Dressing) slow task performance with increased time requirement/ moderate difficulty to complete lower body dressing   -AV               User Key  (r) = Recorded By, (t) = Taken By, (c) = Cosigned By      Initials Name Provider Type    Bart Phelps OT Occupational Therapist                   Obj/Interventions       [facility] Name 02/11/25 1024          Sensory Assessment (Somatosensory)    Sensory Assessment (Somatosensory) UE sensation intact  -AV       [facility] Name 02/11/25 1024          Vision Assessment/Intervention    Visual Impairment/Limitations WFL;corrective lenses for reading  -AV       [facility] Name 02/11/25 1024          Range of Motion Comprehensive    General Range of Motion bilateral upper extremity ROM WFL  -AV     Comment, General Range of Motion AROM  -AV       Row Name 02/11/25 1024          Strength Comprehensive (MMT)    Comment, General Manual Muscle Testing (MMT) Assessment 4+/5 bilateral biceps, triceps and   -AV       Row Name 02/11/25 1024          Motor Skills    Motor Skills coordination;functional endurance  -AV     Coordination WFL  right dominant  -AV     Functional Endurance fair  -AV       [facility] Name 02/11/25 1037 02/11/25 1024       Balance    Balance Assessment sitting dynamic balance;sit to stand dynamic balance;standing dynamic balance  -AV --    Dynamic Sitting Balance independent  -AV --    Sit to Stand Dynamic Balance standby assist;verbal cues;non-verbal cues (demo/gesture)  -AV --    Dynamic Standing Balance standby assist;verbal cues;non-verbal cues (demo/gesture)  -AV --    Position/Device Used, Standing Balance walker, rolling  -AV --    Balance Interventions standing;sit to stand;supported;minimal challenge;occupation based/functional task  -AV --    Comment, Balance -- SBA/ RW  -AV              User Key  (r) = Recorded By, (t) = Taken By, (c) = Cosigned By      Initials Name Provider Type    Bart Phelps OT Occupational Therapist                   Goals/Plan       [facility] Name 02/11/25 1028          Transfer Goal 1 (OT)    Activity/Assistive Device (Transfer Goal 1, OT) transfers,  all;walker, rolling  -AV     [place] Level/Cues Needed (Transfer Goal 1, OT) modified independence  -AV     Time Frame (Transfer Goal 1, OT) long term goal (LTG);10 days  -AV       Row Name 02/11/25 1028          Bathing Goal 1 (OT)    Activity/Device (Bathing Goal 1, OT) bathing skills, all  -AV     [place] Level/Cues Needed (Bathing Goal 1, OT) modified independence  -AV     Time Frame (Bathing Goal 1, OT) long term goal (LTG);10 days  -AV       Row Name 02/11/25 1028          Dressing Goal 1 (OT)    Activity/Device (Dressing Goal 1, OT) dressing skills, all  -AV     [place]/Cues Needed (Dressing Goal 1, OT) modified independence  -AV     Time Frame (Dressing Goal 1, OT) long term goal (LTG);10 days  -AV       Row Name 02/11/25 1028          Toileting Goal 1 (OT)    Activity/Device (Toileting Goal 1, OT) toileting skills, all;raised toilet seat  -AV     [place] Level/Cues Needed (Toileting Goal 1, OT) modified independence  -AV     Time Frame (Toileting Goal 1, OT) long term goal (LTG);10 days  -AV       Row Name 02/11/25 1028          Grooming Goal 1 (OT)    Activity/Device (Grooming Goal 1, OT) grooming skills, all  standing at sink  -AV     [place] (Grooming Goal 1, OT) modified independence  -AV     Time Frame (Grooming Goal 1, OT) long term goal (LTG);10 days  -AV       Row Name 02/11/25 1028          Problem Specific Goal 1 (OT)    Problem Specific Goal 1 (OT) Patient will demonstrate fair plus standing endurance/activity tolerance needed to support ADLs.  -AV     Time Frame (Problem Specific Goal 1, OT) long term goal (LTG);10 days  -AV       Row Name 02/11/25 1028          Therapy Assessment/Plan (OT)    Planned Therapy Interventions (OT) activity tolerance training;BADL retraining;functional balance retraining;occupation/activity based interventions;patient/caregiver education/training;ROM/therapeutic exercise;transfer/mobility retraining  -AV               User Key  (r) =  Recorded By, (t) = Taken By, (c) = Cosigned By      Initials Name Provider Type    AV Bart Nelson, OT Occupational Therapist                   Clinical Impression       Row Name 02/11/25 1025          Pain Assessment    Pain Location knee  -AV     Pain Side/Orientation left  -AV     Additional Documentation Pain Scale: FACES Pre/Post-Treatment (Group)  -AV       Row Name 02/11/25 1025          Pain Scale: FACES Pre/Post-Treatment    Pain: FACES Scale, Pretreatment 2-->hurts little bit  -AV     Posttreatment Pain Rating 2-->hurts little bit  -AV       Row Name 02/11/25 1025          Plan of Care Review    Plan of Care Reviewed With patient  -AV     Progress improving  Through ADL/transfer retraining, patient is now able to perform basic ADLs SBA/RW with setup & cues for safe positioning/ adaptive techn w/ exception of washing left foot.  -AV     Outcome Evaluation Patient presents with limitations of balance and standing endurance/ activity tolerance which are impacting ADL/ transfer independence. Skilled OT services are indicated to remediate/ compensate for deficits to maximize independence and safety with functional ADL tasks.  -AV       Row Name 02/11/25 1025          Therapy Assessment/Plan (OT)    Patient/Family Therapy Goal Statement (OT) Walk better  -AV     Rehab Potential (OT) good  -AV     Criteria for Skilled Therapeutic Interventions Met (OT) yes;meets criteria;skilled treatment is necessary  -AV     Therapy Frequency (OT) 5 times/wk  -AV       Row Name 02/11/25 1025          Therapy Plan Review/Discharge Plan (OT)    Equipment Needs Upon Discharge (OT) --  pt reports having all needed DME  -AV     Anticipated Discharge Disposition (OT) home with assist  outpatient PT  -AV       Row Name 02/11/25 1025          Vital Signs    O2 Delivery Pre Treatment room air  -AV     O2 Delivery Intra Treatment room air  -AV     O2 Delivery Post Treatment room air  -AV       Row Name 02/11/25 1025           Positioning and Restraints    Pre-Treatment Position sitting in chair/recliner  -AV     Post Treatment Position chair  -AV     In Chair reclined;call light within reach;encouraged to call for assist;exit alarm on  -AV               User Key  (r) = Recorded By, (t) = Taken By, (c) = Cosigned By      Initials Name Provider Type    AV Bart Nelson, LOUIE Occupational Therapist                   Outcome Measures       Row Name 02/11/25 1030          How much help from another is currently needed...    Putting on and taking off regular lower body clothing? 3  -AV     Bathing (including washing, rinsing, and drying) 3  -AV     Toileting (which includes using toilet bed pan or urinal) 3  -AV     Putting on and taking off regular upper body clothing 4  -AV     Taking care of personal grooming (such as brushing teeth) 4  -AV     Eating meals 4  -AV     AM-PAC 6 Clicks Score (OT) 21  -AV       Row Name 02/11/25 0806          How much help from another person do you currently need...    Turning from your back to your side while in flat bed without using bedrails? 4  -HALEY     Moving from lying on back to sitting on the side of a flat bed without bedrails? 4  -HALEY     Moving to and from a bed to a chair (including a wheelchair)? 4  -HALEY     Standing up from a chair using your arms (e.g., wheelchair, bedside chair)? 4  -HALEY     Climbing 3-5 steps with a railing? 3  -HALEY     To walk in hospital room? 3  -HALEY     AM-PAC 6 Clicks Score (PT) 22  -HALEY     Highest Level of Mobility Goal 7 --> Walk 25 feet or more  -HALEY       Row Name 02/11/25 1030          Functional Assessment    Outcome Measure Options AM-PAC 6 Clicks Daily Activity (OT);Optimal Instrument  -AV       Row Name 02/11/25 1030          Optimal Instrument    Optimal Instrument Optimal - 3  -AV     Bending/Stooping 2  -AV     Standing 2  -AV     Reaching 1  -AV     From the list, choose the 3 activities you would most like to be able to do without any difficulty  Bending/stooping;Standing;Reaching  -AV     Total Score Optimal - 3 5  -AV               User Key  (r) = Recorded By, (t) = Taken By, (c) = Cosigned By      Initials Name Provider Type    Bart Phelps, OT Occupational Therapist    Merari Burk, RN Registered Nurse                    Occupational Therapy Education       Title: PT OT SLP Therapies (Done)       Topic: Occupational Therapy (Done)       Point: ADL training (Done)       Description:   Instruct learner(s) on proper safety adaptation and remediation techniques during self care or transfers.   Instruct in proper use of assistive devices.                  Learning Progress Summary            Patient Acceptance, E, VU by AV at 2/11/2025 1032    Comment: WBAT  Need for staff assistance for all standing ADL/transfers  Safe transfer techniques  Safe positioning for ADLs  Adaptive techniques for lower body ADLs  ADL home safety/ adaptive equipment recommendations                      Point: Home exercise program (Done)       Description:   Instruct learner(s) on appropriate technique for monitoring, assisting and/or progressing therapeutic exercises/activities.                  Learning Progress Summary            Patient Acceptance, E, VU by AV at 2/11/2025 1032    Comment: WBAT  Need for staff assistance for all standing ADL/transfers  Safe transfer techniques  Safe positioning for ADLs  Adaptive techniques for lower body ADLs  ADL home safety/ adaptive equipment recommendations                      Point: Precautions (Done)       Description:   Instruct learner(s) on prescribed precautions during self-care and functional transfers.                  Learning Progress Summary            Patient Acceptance, E, VU by AV at 2/11/2025 1032    Comment: WBAT  Need for staff assistance for all standing ADL/transfers  Safe transfer techniques  Safe positioning for ADLs  Adaptive techniques for lower body ADLs  ADL home safety/ adaptive equipment  recommendations                      Point: Body mechanics (Done)       Description:   Instruct learner(s) on proper positioning and spine alignment during self-care, functional mobility activities and/or exercises.                  Learning Progress Summary            Patient Acceptance, E, VU by ANANTH at 2/11/2025 1032    Comment: WBAT  Need for staff assistance for all standing ADL/transfers  Safe transfer techniques  Safe positioning for ADLs  Adaptive techniques for lower body ADLs  ADL home safety/ adaptive equipment recommendations                                      User Key       Initials Effective Dates Name Provider Type Discipline    ANANTH 06/16/21 -  Bart Nelson, OT Occupational Therapist OT                  OT Recommendation and Plan  Planned Therapy Interventions (OT): activity tolerance training, BADL retraining, functional balance retraining, occupation/activity based interventions, patient/caregiver education/training, ROM/therapeutic exercise, transfer/mobility retraining  Therapy Frequency (OT): 5 times/wk  Plan of Care Review  Plan of Care Reviewed With: patient  Progress: improving (Through ADL/transfer retraining, patient is now able to perform basic ADLs SBA/RW with setup & cues for safe positioning/ adaptive techn w/ exception of washing left foot.)  Outcome Evaluation: Patient presents with limitations of balance and standing endurance/ activity tolerance which are impacting ADL/ transfer independence. Skilled OT services are indicated to remediate/ compensate for deficits to maximize independence and safety with functional ADL tasks.     Time Calculation:   Evaluation Complexity (OT)  Review Occupational Profile/Medical/Therapy History Complexity: expanded/moderate complexity  Assessment, Occupational Performance/Identification of Deficit Complexity: 1-3 performance deficits  Clinical Decision Making Complexity (OT): problem focused assessment/low complexity  Overall Complexity of Evaluation  (OT): low complexity     Time Calculation- OT       Row Name 02/11/25 1033             Time Calculation- OT    OT Received On 02/11/25  -AV      OT Goal Re-Cert Due Date 02/20/25  -AV         Timed Charges    69251 - OT Self Care/Mgmt Minutes 40  -AV         Untimed Charges    OT Eval/Re-eval Minutes 32  -AV         Total Minutes    Timed Charges Total Minutes 40  -AV      Untimed Charges Total Minutes 32  -AV       Total Minutes 72  -AV                User Key  (r) = Recorded By, (t) = Taken By, (c) = Cosigned By      Initials Name Provider Type    AV Bart Nelson OT Occupational Therapist                  Therapy Charges for Today       Code Description Service Date Service Provider Modifiers Qty    64251918988 HC OT SELF CARE/MGMT/TRAIN EA 15 MIN 2/11/2025 Bart Nelson OT GO 3    85792362722 HC OT EVAL LOW COMPLEXITY 3 2/11/2025 Bart Nelson OT GO 1                 Bart Nelson OT  2/11/2025

## 2025-02-11 NOTE — THERAPY TREATMENT NOTE
Acute Care - Physical Therapy Treatment Note   Ed[place]     Patient Name: Katie Rudd  : 1950  MRN: [id number]  Today's Date: 2025      Visit Dx:     ICD-10-CM ICD-9-CM   1. Difficulty in walking  R26.2 719.7   2. Left knee pain, unspecified chronicity  M25.562 719.46   3. Primary osteoarthritis of left knee  M17.12 715.16   4. Decreased activities of daily living (ADL)  Z78.9 V49.89     Patient Active Problem List   Diagnosis    Primary osteoarthritis of right knee    S/P TKR (total knee replacement)    Left knee pain    Primary osteoarthritis of left knee     Past Medical History:   Diagnosis Date    Anxiety     Arthritis     Bladder dysfunction     Hyperlipidemia     Hypertension     PONV (postoperative nausea and vomiting)      Past Surgical History:   Procedure Laterality Date    BREAST BIOPSY Left     CATARACT EXTRACTION      COLONOSCOPY N/A 2024    Procedure: COLONOSCOPY WITH COLD/HOT SNARE AND BIOPSY;  Surgeon: Giorgio Garcia MD;  Location: [facility] ENDOSCOPY;  Service: General;  Laterality: N/A;  DIVERTICULOSIS, COLON POLYPS, ANAL MUCOSA ABNORMALITY    HYSTERECTOMY      NECK SURGERY      CERVICAL FUSION    TONSILLECTOMY      TOTAL KNEE ARTHROPLASTY Right 2024    Procedure: RIGHT TOTAL KNEE ARTHROPLASTY.;  Surgeon: Glen Platt MD;  Location: [facility] MAIN OR;  Service: Orthopedics;  Laterality: Right;    TOTAL KNEE ARTHROPLASTY Left 2/10/2025    Procedure: LEFT TOTAL KNEE ARTHROPLASTY;  Surgeon: Glen Platt MD;  Location: [facility] MAIN OR;  Service: Orthopedics;  Laterality: Left;    TUBAL ABDOMINAL LIGATION       PT Assessment (Last 12 Hours)       PT Evaluation and Treatment       Row Name 25 1136          Physical Therapy Time and Intention    Subjective Information complains of;pain  -RH     Document Type therapy note (daily note)  -RH     Mode of Treatment individual therapy;group therapy;physical therapy  -RH     Patient Effort good  -RH     Comment  Gait performed individually; therapuetic exercises performed in a group session with 5 participants  -[facility] Name 02/11/25 1136          General Information    Patient Observations alert;cooperative;agree to therapy  -[facility] Name 02/11/25 1136          Pain    Pain Location knee  -     Pain Side/Orientation left  -[facility] Name 02/11/25 1136          Pain Scale: FACES Pre/Post-Treatment    Pain: FACES Scale, Pretreatment 0-->no hurt  -     Posttreatment Pain Rating 0-->no hurt  -[facility] Name 02/11/25 1136          Range of Motion (ROM)    Range of Motion --  Pt  L knee AAROM at 95 degrees flex and 6 degrees ext.  -[facility] Name 02/11/25 1136          Strength (Manual Muscle Testing)    Strength (Manual Muscle Testing) --  Pt L knee ext strenth at 3-/5.  -[facility] 02/11/25 1136          Mobility    Extremity Weight-bearing Status left lower extremity  -     Left Lower Extremity (Weight-bearing Status) weight-bearing as tolerated (WBAT)  -RH       Row Name 02/11/25 1136          Transfers    Transfers sit-stand transfer;stand-sit transfer  -RH       Row Name 02/11/25 1136          Sit-Stand Transfer    Sit-Stand Routt (Transfers) contact guard  -     Assistive Device (Sit-Stand Transfers) walker, front-wheeled  -[facility] Name 02/11/25 1136          Stand-Sit Transfer    Stand-Sit Routt (Transfers) contact guard  -     Assistive Device (Stand-Sit Transfers) walker, front-wheeled  -[facility] Name 02/11/25 1136          Gait/Stairs (Locomotion)    Gait/Stairs Locomotion gait/ambulation assistive device  -     Routt Level (Gait) contact guard  -     Assistive Device (Gait) walker, front-wheeled  -     Patient was able to Ambulate yes  -RH     Distance in Feet (Gait) 165  -     Pattern (Gait) --  Pt able to achieve and maintain reciprocal gait.  -     Deviations/Abnormal Patterns (Gait) stride length decreased;gait speed decreased  -      Left Sided Gait Deviations heel strike decreased  -       Row Name 02/11/25 1136          Safety Issues/Impairments Affecting Functional Mobility    Impairments Affecting Function (Mobility) balance;pain;range of motion (ROM);strength  -       Row Name 02/11/25 1136          Balance    Balance Assessment standing dynamic balance  -     Dynamic Standing Balance contact guard  -     Position/Device Used, Standing Balance walker, front-wheeled  -       Row Name 02/11/25 1136          Motor Skills    Therapeutic Exercise hip;knee;ankle  -       Row Name 02/11/25 1136          Hip (Therapeutic Exercise)    Hip (Therapeutic Exercise) isometric exercises  -     Hip Isometrics (Therapeutic Exercise) left;gluteal sets;10 repetitions;2 sets  -       Row Name 02/11/25 1136          Knee (Therapeutic Exercise)    Knee (Therapeutic Exercise) strengthening exercise;isometric exercises  -     Knee Isometrics (Therapeutic Exercise) left;quad sets;10 repetitions;2 sets  -     Knee Strengthening (Therapeutic Exercise) left;heel slides;SLR (straight leg raise);SAQ (short arc quad);LAQ (long arc quad);10 repetitions;2 sets  -       Row Name 02/11/25 1136          Ankle (Therapeutic Exercise)    Ankle (Therapeutic Exercise) AROM (active range of motion)  -     Ankle AROM (Therapeutic Exercise) left;dorsiflexion;plantarflexion;10 repetitions;2 sets  -       Row Name             Wound 02/10/25 0725 Left anterior knee    Wound - Properties Group Placement Date: 02/10/25  -SC Placement Time: 0725 -SC Side: Left  -SC Orientation: anterior  -SC Location: knee  -SC Primary Wound Type: Incision  -SC    Retired Wound - Properties Group Placement Date: 02/10/25  -SC Placement Time: 0725 -SC Side: Left  -SC Orientation: anterior  -SC Location: knee  -SC Primary Wound Type: Incision  -SC    Retired Wound - Properties Group Placement Date: 02/10/25  -SC Placement Time: 0725 -SC Side: Left  -SC Orientation: anterior  -SC  Location: knee  -SC Primary Wound Type: Incision  -SC    Retired Wound - Properties Group Date first assessed: 02/10/25  -SC Time first assessed: 0725 -SC Side: Left  -SC Location: knee  -SC Primary Wound Type: Incision  -SC      Row Name 02/11/25 1136          Positioning and Restraints    In Chair reclined;call light within reach;encouraged to call for assist;exit alarm on  -RH       Row Name 02/11/25 1136          Progress Summary (PT)    Progress Toward Functional Goals (PT) progress toward functional goals is good  -     Daily Progress Summary (PT) Pt is progressing well with their exercise program.  Will continue current plan of care.  -               User Key  (r) = Recorded By, (t) = Taken By, (c) = Cosigned By      Initials Name Provider Type    Jazmyne Jefferson, RN Registered Nurse    RH El Lema PTA Physical Therapist Assistant                    Physical Therapy Education       Title: PT OT SLP Therapies (Resolved)       Topic: Physical Therapy (Resolved)       Point: Mobility training (Resolved)       Learning Progress Summary            Patient Acceptance, E, VU by AV at 2/11/2025 1032    Comment: WBAT  Need for staff assistance for all standing ADL/transfers  Safe transfer techniques  Safe positioning for ADLs  Adaptive techniques for lower body ADLs  ADL home safety/ adaptive equipment recommendations    Acceptance, E,TB, VU by TM at 2/10/2025 1046                      Point: Home exercise program (Resolved)       Learning Progress Summary            Patient Acceptance, E, VU by AV at 2/11/2025 1032    Comment: WBAT  Need for staff assistance for all standing ADL/transfers  Safe transfer techniques  Safe positioning for ADLs  Adaptive techniques for lower body ADLs  ADL home safety/ adaptive equipment recommendations    Acceptance, E,TB, VU by TM at 2/10/2025 1046                      Point: Body mechanics (Resolved)       Learning Progress Summary            Patient Acceptance, E, VU  by AV at 2/11/2025 1032    Comment: WBAT  Need for staff assistance for all standing ADL/transfers  Safe transfer techniques  Safe positioning for ADLs  Adaptive techniques for lower body ADLs  ADL home safety/ adaptive equipment recommendations    Acceptance, E,TB, VU by TM at 2/10/2025 1046                      Point: Precautions (Resolved)       Learning Progress Summary            Patient Acceptance, E, VU by AV at 2/11/2025 1032    Comment: WBAT  Need for staff assistance for all standing ADL/transfers  Safe transfer techniques  Safe positioning for ADLs  Adaptive techniques for lower body ADLs  ADL home safety/ adaptive equipment recommendations    Acceptance, E,TB, VU by TM at 2/10/2025 1046                                      User Key       Initials Effective Dates Name Provider Type Discipline    AV 06/16/21 -  Bart Nelson OT Occupational Therapist OT    TM 02/04/25 -  Dick Scott, DOMI Student PT Student PT                  PT Recommendation and Plan     Progress Summary (PT)  Progress Toward Functional Goals (PT): progress toward functional goals is good  Daily Progress Summary (PT): Pt is progressing well with their exercise program.  Will continue current plan of care.   Outcome Measures       Row Name 02/11/25 1100 02/10/25 1614 02/10/25 1039       How much help from another person do you currently need...    Turning from your back to your side while in flat bed without using bedrails? 4  - 4  -SM 4  -CHRIS (r) TM (t) CHRIS (c)    Moving from lying on back to sitting on the side of a flat bed without bedrails? 4  - 4  -SM 4  -CHRIS (r) TM (t) CHRIS (c)    Moving to and from a bed to a chair (including a wheelchair)? 4  - 4  -SM 3  -CHRIS (r) TM (t) CHRIS (c)    Standing up from a chair using your arms (e.g., wheelchair, bedside chair)? 4  - 4  -SM 3  -CHRIS (r) TM (t) CHRIS (c)    Climbing 3-5 steps with a railing? 4  - 3  -SM 3  -CHRIS (r) TM (t) CHRIS (c)    To walk in hospital room? 4  - 3  -SM 3  -CHRIS (r) TM (t)  CHRIS (c)    AM-PAC 6 Clicks Score (PT) 24  -RH 22  -SM 20  -CHRIS (r) TM (t)       Functional Assessment    Outcome Measure Options -- -- AM-PAC 6 Clicks Basic Mobility (PT)  -CHRIS (r) TM (t) CHRIS (c)              User Key  (r) = Recorded By, (t) = Taken By, (c) = Cosigned By      Initials Name Provider Type     El Lema, NICOLE Physical Therapist Assistant    Michael Zhou, PT Physical Therapist     Virginia Hays, PTA Physical Therapist Assistant    Dick Correia, PT Student PT Student                     Time Calculation:    PT Charges       Row Name 02/11/25 1135             Time Calculation    PT Received On 02/11/25  -RH         Timed Charges    92503 - Gait Training Minutes  9  -RH      47596 - PT Therapeutic Activity Minutes 4  -RH         Untimed Charges    PT Group Therapy Minutes 30  -RH         Total Minutes    Timed Charges Total Minutes 13  -RH      Untimed Charges Total Minutes 30  -RH       Total Minutes 43  -RH                User Key  (r) = Recorded By, (t) = Taken By, (c) = Cosigned By      Initials Name Provider Type     El Lema PTA Physical Therapist Assistant                  Therapy Charges for Today       Code Description Service Date Service Provider Modifiers Qty    17036268167 HC GAIT TRAINING EA 15 MIN 2/11/2025 El Lema PTA GP 1    28523286916 HC PT THER PROC GROUP 2/11/2025 El Lema PTA GP 1            PT G-Codes  Outcome Measure Options: AM-PAC 6 Clicks Daily Activity (OT), Optimal Instrument  AM-PAC 6 Clicks Score (PT): 24  AM-PAC 6 Clicks Score (OT): 21    El Lema PTA  2/11/2025

## 2025-02-11 NOTE — PLAN OF CARE
Goal Outcome Evaluation:  Plan of Care Reviewed With: patient        Progress: improving (Through ADL/transfer retraining, patient is now able to perform basic ADLs SBA/RW with setup & cues for safe positioning/ adaptive techn w/ exception of washing left foot.)  Outcome Evaluation: Patient presents with limitations of balance and standing endurance/ activity tolerance which are impacting ADL/ transfer independence. Skilled OT services are indicated to remediate/ compensate for deficits to maximize independence and safety with functional ADL tasks.    Anticipated Discharge Disposition (OT): home with assist (outpatient PT)

## 2025-02-11 NOTE — SIGNIFICANT NOTE
02/11/25 0721   Plan   Final Discharge Disposition Code 01 - home or self-care   Final Note RUBEN Whitley. Appt: 02/12/25 at 1PM.

## 2025-02-11 NOTE — PLAN OF CARE
Goal Outcome Evaluation:  Plan of Care Reviewed With: patient        Progress: no change  Outcome Evaluation: pt. is aao and calls for assistance.  pt. is a one peraon assist with walker and has ambulated 210 feet this shift.  voiding without difficulty.  pt. has been medicated with scheduled pain medication with relief noted.  upon discharge pt. is going home with outpt tharapy.

## 2025-02-11 NOTE — PROGRESS NOTES
[facility]   Hospitalist Progress Note  Date: 2025  Patient Name: Katie Rudd  : 1950  MRN: [id number]  Date of admission: 2/10/2025  Room/Bed: 223/1      Subjective   Subjective     Chief Complaint: Osteoarthritis    Summary:Katie Rudd is a 75 y.o. female past medical history significant for hypertension, hyperlipidemia, depression, bladder dysfunction, osteoarthritis with associated left knee pain failed respond to conservative medical management underwent total knee arthroplasty hospitalist contacted postoperatively for management of her chronic medical conditions.      Interval Followup: No acute overnight events.  No acute distress.  Patient resting comfortably bedside chair.  Reports she had a good night.  Pain is controlled.  She is waiting on PT to come work with her.      Objective   Objective     Vitals:   Temp:  [97.5 °F (36.4 °C)-98.6 °F (37 °C)] 98.6 °F (37 °C)  Heart Rate:  [59-73] 71  Resp:  [16] 16  BP: (112-128)/(45-59) 116/50    Physical Exam   Gen: NAD, Alert and Oriented  Pulm: Room air, no respiratory distress  Abd: soft, nondistended  Extremities: no pitting edema    Result Review    Result Review:  I have personally reviewed these results:  [x]  Laboratory      Lab 25  1212   WBC 8.67 6.58   HEMOGLOBIN 11.0* 14.0   HEMATOCRIT 35.1 43.6   PLATELETS 171 195   NEUTROS ABS  --  4.32   IMMATURE GRANS (ABS)  --  0.02   LYMPHS ABS  --  1.70   MONOS ABS  --  0.45   EOS ABS  --  0.07   MCV 93.1 90.1         Lab 25  04025  1212   SODIUM 137 140   POTASSIUM 4.3 4.6   CHLORIDE 104 104   CO2 26.1 27.6   ANION GAP 6.9 8.4   BUN 17 13   CREATININE 0.88 0.66   EGFR 68.6 91.6   GLUCOSE 107* 96   CALCIUM 8.6 9.0   PHOSPHORUS 3.7  --    HEMOGLOBIN A1C  --  5.40         Lab 25  0408 25  1212   TOTAL PROTEIN  --  6.6   ALBUMIN 3.1* 4.0   GLOBULIN  --  2.6   ALT (SGPT)  --  17   AST (SGOT)  --  16   BILIRUBIN  --  0.8   ALK PHOS   --  111                     Brief Urine Lab Results       None          [x]  Microbiology   Microbiology Results (last 10 days)       ** No results found for the last 240 hours. **          [x]  Radiology  XR Knee 1 or 2 View Left    Result Date: 2/10/2025  Impression: 1.Status post left knee arthroplasty. 2.No immediate postoperative complication is identified. Electronically Signed: Ritesh Bullock  2/10/2025 8:57 AM EST  Workstation ID: [id number]   []  EKG/Telemetry   []  Cardiology/Vascular   []  Pathology  []  Old records  []  Other:    Assessment & Plan   Assessment / Plan     Assessment:  S/p left total knee replacement  Hypertension  Hyperlipidemia  Depression  Anxiety  Osteoarthritis    Plan:  Outpatient bed  Ortho following  PT/OT  Multimodal pain control  Nausea medication  Hemoglobin 11 postsurgically  Okay to DC home from IM standpoint       Discussed with RN.    VTE Prophylaxis:  Pharmacologic & mechanical VTE prophylaxis orders are present.        CODE STATUS:   Code Status (Patient has no pulse and is not breathing): CPR (Attempt to Resuscitate)  Medical Interventions (Patient has pulse or is breathing): Full Support      Electronically signed by Kelly Alberts DO, 2/11/2025, 12:36 EST.

## 2025-02-11 NOTE — PLAN OF CARE
Goal Outcome Evaluation:  Plan of Care Reviewed With: patient        Progress: no change  Outcome Evaluation: Outcome Evaluation: Pt is A & O x4. Pt had a left total knee done 2/10/25 by doctor Platt. Pt is a x1 assist with a walker and gait belt. Pt was able to work with PT and OT today before discharging. Pt walked 330 ft with PT today before going home. No problems with voiding. PT appointment is scheduled on 2/12/25 at 1300. Pt is going home with her medications. No DME  is needed for the pt. Pt is going home with the help of her .

## 2025-02-12 NOTE — THERAPY DISCHARGE NOTE
Acute Care - Occupational Therapy Discharge   [place]    Patient Name: Katie Rudd  : 1950    MRN: [id number]                              Today's Date: 2025       Admit Date: 2/10/2025    Visit Dx:     ICD-10-CM ICD-9-CM   1. Difficulty in walking  R26.2 719.7   2. Left knee pain, unspecified chronicity  M25.562 719.46   3. Primary osteoarthritis of left knee  M17.12 715.16   4. Decreased activities of daily living (ADL)  Z78.9 V49.89     Patient Active Problem List   Diagnosis    Primary osteoarthritis of right knee    S/P TKR (total knee replacement)    Left knee pain    Primary osteoarthritis of left knee     Past Medical History:   Diagnosis Date    Anxiety     Arthritis     Bladder dysfunction     Hyperlipidemia     Hypertension     PONV (postoperative nausea and vomiting)      Past Surgical History:   Procedure Laterality Date    BREAST BIOPSY Left     CATARACT EXTRACTION      COLONOSCOPY N/A 2024    Procedure: COLONOSCOPY WITH COLD/HOT SNARE AND BIOPSY;  Surgeon: Giorgio Garcia MD;  Location: [facility] ENDOSCOPY;  Service: General;  Laterality: N/A;  DIVERTICULOSIS, COLON POLYPS, ANAL MUCOSA ABNORMALITY    HYSTERECTOMY      NECK SURGERY      CERVICAL FUSION    TONSILLECTOMY      TOTAL KNEE ARTHROPLASTY Right 2024    Procedure: RIGHT TOTAL KNEE ARTHROPLASTY.;  Surgeon: Glen Platt MD;  Location: [facility] MAIN OR;  Service: Orthopedics;  Laterality: Right;    TOTAL KNEE ARTHROPLASTY Left 2/10/2025    Procedure: LEFT TOTAL KNEE ARTHROPLASTY;  Surgeon: Glen Platt MD;  Location: [facility] MAIN OR;  Service: Orthopedics;  Laterality: Left;    TUBAL ABDOMINAL LIGATION        General Information    No documentation.                  Mobility/ADL's    No documentation.                  Obj/Interventions    No documentation.                  Goals/Plan       Row Name 25 0822          Transfer Goal 1 (OT)    Activity/Assistive Device (Transfer Goal 1, OT) transfers,  all;walker, rolling  -AV     [place] Level/Cues Needed (Transfer Goal 1, OT) modified independence  -AV     Time Frame (Transfer Goal 1, OT) long term goal (LTG);10 days  -AV     Progress/Outcome (Transfer Goal 1, OT) good progress toward goal;discharged from [facility]  -       Row Name 02/12/25 0822          Bathing Goal 1 (OT)    Activity/Device (Bathing Goal 1, OT) bathing skills, all  -AV     [place] Level/Cues Needed (Bathing Goal 1, OT) modified independence  -AV     Time Frame (Bathing Goal 1, OT) long term goal (LTG);10 days  -AV     Progress/Outcomes (Bathing Goal 1, OT) good progress toward goal;discharged from [facility]  -       Row Name 02/12/25 0822          Dressing Goal 1 (OT)    Activity/Device (Dressing Goal 1, OT) dressing skills, all  -AV     [place]/Cues Needed (Dressing Goal 1, OT) modified independence  -AV     Time Frame (Dressing Goal 1, OT) long term goal (LTG);10 days  -AV     Progress/Outcome (Dressing Goal 1, OT) good progress toward goal;discharged from [facility]  -       Row Name 02/12/25 0822          Toileting Goal 1 (OT)    Activity/Device (Toileting Goal 1, OT) toileting skills, all;raised toilet seat  -AV     [place] Level/Cues Needed (Toileting Goal 1, OT) modified independence  -AV     Time Frame (Toileting Goal 1, OT) long term goal (LTG);10 days  -AV     Progress/Outcome (Toileting Goal 1, OT) good progress toward goal;discharged from [facility]  -       Row Name 02/12/25 0822          Grooming Goal 1 (OT)    Activity/Device (Grooming Goal 1, OT) grooming skills, all  -AV     [place] (Grooming Goal 1, OT) modified independence  -AV     Time Frame (Grooming Goal 1, OT) long term goal (LTG);10 days  -AV     Progress/Outcome (Grooming Goal 1, OT) good progress toward goal;discharged from [facility]       Row Name 02/12/25 0822          Problem Specific Goal 1 (OT)    Problem Specific Goal 1 (OT) Patient will demonstrate fair plus standing  endurance/activity tolerance needed to support ADLs.  -AV     Time Frame (Problem Specific Goal 1, OT) long term goal (LTG);10 days  -AV     Progress/Outcome (Problem Specific Goal 1, OT) good progress toward goal;discharged from facility  -AV               User Key  (r) = Recorded By, (t) = Taken By, (c) = Cosigned By      Initials Name Provider Type    AV Bart Nelson OT Occupational Therapist                   Clinical Impression    No documentation.                  Outcome Measures    No documentation.                 Occupational Therapy Education       Title: PT OT SLP Therapies (Resolved)       Topic: Occupational Therapy (Resolved)       Point: ADL training (Resolved)       Description:   Instruct learner(s) on proper safety adaptation and remediation techniques during self care or transfers.   Instruct in proper use of assistive devices.                  Learning Progress Summary            Patient Acceptance, E, VU by AV at 2/11/2025 1032    Comment: WBAT  Need for staff assistance for all standing ADL/transfers  Safe transfer techniques  Safe positioning for ADLs  Adaptive techniques for lower body ADLs  ADL home safety/ adaptive equipment recommendations                      Point: Home exercise program (Resolved)       Description:   Instruct learner(s) on appropriate technique for monitoring, assisting and/or progressing therapeutic exercises/activities.                  Learning Progress Summary            Patient Acceptance, E, VU by AV at 2/11/2025 1032    Comment: WBAT  Need for staff assistance for all standing ADL/transfers  Safe transfer techniques  Safe positioning for ADLs  Adaptive techniques for lower body ADLs  ADL home safety/ adaptive equipment recommendations                      Point: Precautions (Resolved)       Description:   Instruct learner(s) on prescribed precautions during self-care and functional transfers.                  Learning Progress Summary            Patient  Acceptance, E, VU by AV at 2/11/2025 1032    Comment: WBAT  Need for staff assistance for all standing ADL/transfers  Safe transfer techniques  Safe positioning for ADLs  Adaptive techniques for lower body ADLs  ADL home safety/ adaptive equipment recommendations                      Point: Body mechanics (Resolved)       Description:   Instruct learner(s) on proper positioning and spine alignment during self-care, functional mobility activities and/or exercises.                  Learning Progress Summary            Patient Acceptance, E, VU by AV at 2/11/2025 1032    Comment: WBAT  Need for staff assistance for all standing ADL/transfers  Safe transfer techniques  Safe positioning for ADLs  Adaptive techniques for lower body ADLs  ADL home safety/ adaptive equipment recommendations                                      User Key       Initials Effective Dates Name Provider Type Discipline    AV 06/16/21 -  Bart Nelson OT Occupational Therapist OT                  OT Recommendation and Plan  Planned Therapy Interventions (OT): activity tolerance training, BADL retraining, functional balance retraining, occupation/activity based interventions, patient/caregiver education/training, ROM/therapeutic exercise, transfer/mobility retraining  Therapy Frequency (OT): 5 times/wk  Plan of Care Review  Plan of Care Reviewed With: patient  Progress: improving (Through ADL/transfer retraining, patient is now able to perform basic ADLs SBA/RW with setup & cues for safe positioning/ adaptive techn w/ exception of washing left foot.)  Outcome Evaluation: Patient presents with limitations of balance and standing endurance/ activity tolerance which are impacting ADL/ transfer independence. Skilled OT services are indicated to remediate/ compensate for deficits to maximize independence and safety with functional ADL tasks.  Plan of Care Reviewed With: patient  Outcome Evaluation: Patient presents with limitations of balance and  standing endurance/ activity tolerance which are impacting ADL/ transfer independence. Skilled OT services are indicated to remediate/ compensate for deficits to maximize independence and safety with functional ADL tasks.     Time Calculation:   Evaluation Complexity (OT)  Review Occupational Profile/Medical/Therapy History Complexity: expanded/moderate complexity  Assessment, Occupational Performance/Identification of Deficit Complexity: 1-3 performance deficits  Clinical Decision Making Complexity (OT): problem focused assessment/low complexity  Overall Complexity of Evaluation (OT): low complexity      Therapy Charges for Today       Code Description Service Date Service Provider Modifiers Qty    [id number]  OT SELF CARE/MGMT/TRAIN EA 15 MIN 2/11/2025 Bart Nelson OT GO 3    [id number]  OT EVAL LOW COMPLEXITY 3 2/11/2025 Bart Nelson OT GO 1               OT Discharge Summary  Anticipated Discharge Disposition (OT): home with assist (outpatient PT)  Outcomes Achieved: Discharge from facility occurred on same date as evluation    Bart Nelson OT  2/12/2025

## 2025-02-18 ENCOUNTER — TELEPHONE (OUTPATIENT)
Dept: ORTHOPEDIC SURGERY | Facility: CLINIC | Age: 75
End: 2025-02-18
Payer: MEDICARE

## 2025-02-18 DIAGNOSIS — R26.2 DIFFICULTY IN WALKING: ICD-10-CM

## 2025-02-18 RX ORDER — HYDROCODONE BITARTRATE AND ACETAMINOPHEN 7.5; 325 MG/1; MG/1
1 TABLET ORAL EVERY 4 HOURS PRN
Qty: 42 TABLET | Refills: 0 | Status: SHIPPED | OUTPATIENT
Start: 2025-02-18

## 2025-02-25 ENCOUNTER — OFFICE VISIT (OUTPATIENT)
Dept: ORTHOPEDIC SURGERY | Facility: CLINIC | Age: 75
End: 2025-02-25
Payer: MEDICARE

## 2025-02-25 VITALS
HEART RATE: 68 BPM | HEIGHT: 65 IN | OXYGEN SATURATION: 97 % | DIASTOLIC BLOOD PRESSURE: 81 MMHG | BODY MASS INDEX: 31.32 KG/M2 | WEIGHT: 188 LBS | SYSTOLIC BLOOD PRESSURE: 127 MMHG

## 2025-02-25 DIAGNOSIS — Z47.1 AFTERCARE FOLLOWING LEFT KNEE JOINT REPLACEMENT SURGERY: ICD-10-CM

## 2025-02-25 DIAGNOSIS — Z96.652 S/P TOTAL KNEE ARTHROPLASTY, LEFT: Primary | ICD-10-CM

## 2025-02-25 DIAGNOSIS — M25.562 LEFT KNEE PAIN, UNSPECIFIED CHRONICITY: ICD-10-CM

## 2025-02-25 DIAGNOSIS — Z96.652 AFTERCARE FOLLOWING LEFT KNEE JOINT REPLACEMENT SURGERY: ICD-10-CM

## 2025-02-25 PROCEDURE — 99024 POSTOP FOLLOW-UP VISIT: CPT

## 2025-02-25 NOTE — PROGRESS NOTES
"Chief Complaint  Follow-up of the Left Knee and Suture / Staple Removal    Subjective      Katie Rudd presents to [facility] for follow up of their  left knee. She is 2 weeks status post left total knee arthroplasty by Dr Platt on 2/10/25.  She arrives today ambulating with a cane.  She has not had to take any pain medication.  She states she is taken Tylenol as needed.  She is having some swelling but she is icing frequently.  She is doing physical therapy at [facility].    No Known Allergies    Objective     Vital Signs:   Vitals:    02/25/25 0912   BP: 127/81   Pulse: 68   SpO2: 97%   Weight: 85.3 kg (188 lb)   Height: 165.1 cm (65\")     Body mass index is 31.28 kg/m².    I reviewed the patient's chief complaint, history of present illness, review of systems, past medical history, surgical history, family history, social history, medications, and allergy list.     Ortho Exam  Knee   General: Alert, no acute distress.   Left Knee: No pain with passive hip ROM.  Staples removed today in office without complication. Steri-strips applied, incision is clean, dry and intact, without swelling, redness or drainage.  Knee stable to varus/valgus stress.  Knee extensor mechanism intact.  -5 degrees knee extension. Flexion to 80 degrees. Calf soft, non-tender.  Sensation and neurovascularly intact.  Demonstrates active ankle dorsiflexion and plantarflexion.  Palpable pedal pulses.               Imaging Results (Most Recent)       Procedure Component Value Units Date/Time    XR Knee 3 View Left [[id number]] Resulted: 02/25/25 0929     Updated: 02/25/25 0929    Narrative:      X-Ray Report:  Study: X-rays ordered, taken in the office, and reviewed today.   Site: Left knee Xray  Indication: Left total knee arthroplasty follow up.   View: AP/Lateral, Standing, and [place] view(s)  Findings: Intact left total knee arthroplasty. No evidence of hardware   malfunction, complication or loosening. No " periprosthetic fractures   visualized. Patella is midline tracking on sunrise view. Retained staple   was removed prior to patient leaving the office   Prior studies available for comparison: yes                    Assessment and Plan   Diagnoses and all orders for this visit:    1. S/P total knee arthroplasty, left (Primary)    2. Aftercare following left knee joint replacement surgery  -     XR Knee 3 View Left    3. Left knee pain, unspecified chronicity         Katie Rudd presents today 2 weeks post op left total knee arthroplasty performed by Dr. Platt on 2/10/25. X-rays were reviewed with the patient today. Sutures/staples removed today without complications. Incisions are well healing without active drainage or concerns for infection. Incision site care was reviewed today. Patient instructed not to soak or submerge incisions. Do not apply any lotions, creams, or ointments to the incisions at this time.      Patient instructed to continue with formal physical therapy and the use of cane/walker until recommended by PT to discontinue. We discussed the importance of home exercises in addition to PT. We discussed swelling management with continued icing of the knee for approximately 15-20 minutes, three times daily, and as needed.     We discussed the importance of weaning from narcotic medications. Patient expressed understanding.  Patient will continue ASA for DVT prophylaxis until prescription completed.      Patient will follow-up in 4 weeks for reevaluation.  We do not need xrays at this visit.     Call or return if symptoms worsen or patient has any concerns.              Tobacco Use: Low Risk  (2/25/2025)    Patient History     Smoking Tobacco Use: Never     Smokeless Tobacco Use: Never     Passive Exposure: Not on file     Patient reports that they are a nonsmoker; cessation education not applicable.            Follow Up   No follow-ups on file.  There are no Patient Instructions on file for  this visit.    Patient was given instructions and counseling regarding her condition or for health maintenance advice. Please see specific information pulled into the AVS if appropriate.     Dictated Utilizing Dragon Dictation. Please note that portions of this note were completed with a voice recognition program. Part of this note may be an electronic transcription/translation of spoken language to printed text using the Dragon Dictation System.

## 2025-03-25 ENCOUNTER — OFFICE VISIT (OUTPATIENT)
Dept: ORTHOPEDIC SURGERY | Facility: CLINIC | Age: 75
End: 2025-03-25
Payer: MEDICARE

## 2025-03-25 VITALS
BODY MASS INDEX: 31.32 KG/M2 | WEIGHT: 188 LBS | DIASTOLIC BLOOD PRESSURE: 75 MMHG | HEART RATE: 74 BPM | SYSTOLIC BLOOD PRESSURE: 116 MMHG | HEIGHT: 65 IN | OXYGEN SATURATION: 95 %

## 2025-03-25 DIAGNOSIS — Z96.652 S/P TOTAL KNEE ARTHROPLASTY, LEFT: Primary | ICD-10-CM

## 2025-03-25 DIAGNOSIS — M25.562 LEFT KNEE PAIN, UNSPECIFIED CHRONICITY: ICD-10-CM

## 2025-03-25 DIAGNOSIS — Z47.1 AFTERCARE FOLLOWING LEFT KNEE JOINT REPLACEMENT SURGERY: ICD-10-CM

## 2025-03-25 DIAGNOSIS — Z96.652 AFTERCARE FOLLOWING LEFT KNEE JOINT REPLACEMENT SURGERY: ICD-10-CM

## 2025-03-25 PROCEDURE — 99024 POSTOP FOLLOW-UP VISIT: CPT

## 2025-03-25 PROCEDURE — 1159F MED LIST DOCD IN RCRD: CPT

## 2025-03-25 PROCEDURE — 1160F RVW MEDS BY RX/DR IN RCRD: CPT
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
## 2025-05-02 ENCOUNTER — OFFICE VISIT (OUTPATIENT)
Dept: ORTHOPEDIC SURGERY | Facility: CLINIC | Age: 75
End: 2025-05-02
Payer: MEDICARE

## 2025-05-02 VITALS
DIASTOLIC BLOOD PRESSURE: 72 MMHG | OXYGEN SATURATION: 96 % | BODY MASS INDEX: 31.33 KG/M2 | WEIGHT: 188.05 LBS | HEIGHT: 65 IN | SYSTOLIC BLOOD PRESSURE: 112 MMHG | HEART RATE: 71 BPM

## 2025-05-02 DIAGNOSIS — Z96.652 S/P TOTAL KNEE ARTHROPLASTY, LEFT: Primary | ICD-10-CM

## 2025-05-02 DIAGNOSIS — Z47.1 AFTERCARE FOLLOWING LEFT KNEE JOINT REPLACEMENT SURGERY: ICD-10-CM

## 2025-05-02 DIAGNOSIS — Z96.652 AFTERCARE FOLLOWING LEFT KNEE JOINT REPLACEMENT SURGERY: ICD-10-CM

## 2025-05-02 DIAGNOSIS — M25.562 LEFT KNEE PAIN, UNSPECIFIED CHRONICITY: ICD-10-CM

## 2025-05-02 PROCEDURE — 99024 POSTOP FOLLOW-UP VISIT: CPT

## 2025-05-02 PROCEDURE — 1160F RVW MEDS BY RX/DR IN RCRD: CPT

## 2025-05-02 PROCEDURE — 1159F MED LIST DOCD IN RCRD: CPT

## 2025-05-02 RX ORDER — HYDROCHLOROTHIAZIDE 25 MG/1
25 TABLET ORAL DAILY
COMMUNITY
Start: 2025-05-01 | End: 2025-07-30

## 2025-05-02 RX ORDER — MIRABEGRON 50 MG/1
1 TABLET, FILM COATED, EXTENDED RELEASE ORAL DAILY
COMMUNITY
Start: 2025-05-01 | End: 2025-07-30

## 2025-05-02 NOTE — PROGRESS NOTES
"Chief Complaint  Follow-up of the Left Knee    Subjective      Katie Rudd presents to [facility]     History of Present Illness  She is here today following up on her left knee.  She is almost 12 weeks status post left total knee arthroplasty performed Dr. Platt on 2/10/2025.  She had been attending physical therapy at [facility].    Intermittent nerve-like pain in the knee is reported, particularly noticeable when rolling over during sleep. Despite this, no significant discomfort is experienced. Utilization of a dynasplint has not provided substantial benefit. Physical therapy sessions are attended twice weekly at [facility], with noticeable improvement in knee flexibility and overall condition. Bending the knee is now possible, and therapy sessions are found beneficial in maintaining knee flexibility. Uncertainty about the duration of therapy sessions is expressed, with plans to inquire during the next visit. No pain medication is currently taken, except for Tylenol. The effectiveness of Tylenol varies, with some days being more effective than others. When the medication is effective, the leg feels better.        No Known Allergies    Objective     Vital Signs:   Vitals:    05/02/25 1009   BP: 112/72   Pulse: 71   SpO2: 96%   Weight: 85.3 kg (188 lb 0.8 oz)   Height: 165.1 cm (65\")     Body mass index is 31.29 kg/m².    I reviewed the patient's chief complaint, history of present illness, review of systems, past medical history, surgical history, family history, social history, medications, and allergy list.     Ortho Exam  Knee   General: Alert, no acute distress.   Left knee: No pain with passive hip ROM.  Incision well-healing.Knee stable to varus/valgus stress.  Knee extensor mechanism intact. 0 degrees knee extension. Flexion to 125 degrees. Calf soft, non-tender.  Sensation and neurovascularly intact.  Demonstrates active ankle dorsiflexion and plantarflexion.  Palpable pedal " pulses.         Physical Exam  General: Patient appears well and in no acute distress.    Musculoskeletal:  Right Knee: Patient has good range of motion in the right knee.            Imaging Results (Most Recent)       Procedure Component Value Units Date/Time    XR Knee 3 View Left [[id number]] Resulted: 05/02/25 1035     Updated: 05/02/25 1035    Narrative:      X-Ray Report:  Study: X-rays ordered, taken in the office, and reviewed today.   Site: Left knee Xray  Indication: Left total knee arthroplasty follow up.   View: AP/Lateral, Standing, and [place] view(s)  Findings: Intact left total knee arthroplasty. No evidence of hardware   malfunction, complication or loosening. No periprosthetic fractures   visualized. Patella is midline tracking on sunrise view.   Prior studies available for comparison: yes                        Assessment and Plan   Diagnoses and all orders for this visit:    1. S/P total knee arthroplasty, left (Primary)  -     XR Knee 3 View Left    2. Aftercare following left knee joint replacement surgery  -     XR Knee 3 View Left    3. Left knee pain, unspecified chronicity  -     XR Knee 3 View Left         Katie Rudd presents today 12 weeks post op left total knee arthroplasty performed by Dr. Platt on 2/10/2025. X-rays were reviewed with the patient today. Hardware is stable and intact. Patient is doing well.     Incision is well healed. Encouraged to continue home exercises for ROM and strengthening. May continue icing as needed for no more than 20 minutes at a time for comfort and inflammation. Discussed avoiding kneeling directly on to the prosthetic and avoid deep squatting.     Discussed the need for antibiotic prophylaxis with any dental procedures following total joint replacement for life. Patient instructed to contact office if dental office is reluctant to prescribe antibiotics prior to procedure and we will prescribe them.      Patient will follow-up in 9 months. We  will obtain new x-rays of the left knee at next visit.    Call or return if symptoms worsen or patient has any concerns.          Tobacco Use: Low Risk  (5/4/2025)    Patient History     Smoking Tobacco Use: Never     Smokeless Tobacco Use: Never     Passive Exposure: Not on file     Patient reports that they are a nonsmoker; cessation education not applicable.            Follow Up   Return in about 9 months (around 2/2/2026).  There are no Patient Instructions on file for this visit.    Patient was given instructions and counseling regarding her condition or for health maintenance advice. Please see specific information pulled into the AVS if appropriate.     Patient or patient representative verbalized consent for the use of Ambient Listening during the visit with  RICARDO Means for chart documentation. 5/4/2025  20:06 EDT    Dictated Utilizing Dragon Dictation. Please note that portions of this note were completed with a voice recognition program. Part of this note may be an electronic transcription/translation of spoken language to printed text using the Dragon Dictation System.

## 2025-05-30 ENCOUNTER — OFFICE VISIT (OUTPATIENT)
Dept: ORTHOPEDIC SURGERY | Facility: CLINIC | Age: 75
End: 2025-05-30
Payer: MEDICARE

## 2025-05-30 VITALS
WEIGHT: 188.05 LBS | SYSTOLIC BLOOD PRESSURE: 132 MMHG | HEIGHT: 65 IN | DIASTOLIC BLOOD PRESSURE: 74 MMHG | BODY MASS INDEX: 31.33 KG/M2 | HEART RATE: 77 BPM | OXYGEN SATURATION: 96 %

## 2025-05-30 DIAGNOSIS — Z47.1 AFTERCARE FOLLOWING LEFT KNEE JOINT REPLACEMENT SURGERY: ICD-10-CM

## 2025-05-30 DIAGNOSIS — Z96.652 AFTERCARE FOLLOWING LEFT KNEE JOINT REPLACEMENT SURGERY: ICD-10-CM

## 2025-05-30 DIAGNOSIS — Z96.652 S/P TOTAL KNEE ARTHROPLASTY, LEFT: Primary | ICD-10-CM

## 2025-05-30 DIAGNOSIS — Z96.651 S/P TOTAL KNEE ARTHROPLASTY, RIGHT: ICD-10-CM

## 2025-05-30 RX ORDER — AMOXICILLIN 500 MG/1
2000 CAPSULE ORAL ONCE
Qty: 4 CAPSULE | Refills: 0 | Status: SHIPPED | OUTPATIENT
Start: 2025-05-30 | End: 2025-05-30

## 2025-05-30 NOTE — PROGRESS NOTES
"Chief Complaint  Follow-up of the Left Knee    Subjective      Katie Rudd presents to [facility]     History of Present Illness  Patient is here today following up on her left knee.  She is almost 4 months status post left total knee arthroplasty performed Dr. Platt on 2/10/2025.    They report experiencing morning stiffness in their left knee, necessitating the use of their right foot for initial mobility upon rising from bed. They describe a sensation of tightness in the posterior aspect of their left knee. Once they begin walking, the stiffness in their left knee subsides. They have been engaging in rehabilitation exercises, including cycling, which they find beneficial. However, they note that their left knee tends to stiffen a few days post-rehabilitation. They have been advised to maintain mobility and have been considering the purchase of a home exercise bike. They underwent physical therapy yesterday and are uncertain about the need for further sessions.    They also mention that they carry a cane in their car for support on uneven terrain. They had their right knee surgery in 08/2024.    They have a scheduled dental cleaning appointment on Tuesday and are seeking advice on whether any medication is required prior to the procedure.      No Known Allergies    Objective     Vital Signs:   Vitals:    05/30/25 1026   BP: 132/74   Pulse: 77   SpO2: 96%   Weight: 85.3 kg (188 lb 0.8 oz)   Height: 165.1 cm (65\")     Body mass index is 31.29 kg/m².    I reviewed the patient's chief complaint, history of present illness, review of systems, past medical history, surgical history, family history, social history, medications, and allergy list.     Ortho Exam    General: Alert, no acute distress.   Left Knee: No pain with passive hip ROM. Knee stable to varus/valgus stress.  Knee extensor mechanism intact. -4 degrees knee extension. Flexion to 120 degrees. Calf soft, non-tender.  " Sensation and neurovascularly intact.  Demonstrates active ankle dorsiflexion and plantarflexion.  Palpable pedal pulses.             Imaging Results (Most Recent)       None              Assessment and Plan   Diagnoses and all orders for this visit:    1. S/P total knee arthroplasty, left (Primary)  -     amoxicillin (AMOXIL) 500 MG capsule; Take 4 capsules by mouth 1 time for 1 dose.  Dispense: 4 capsule; Refill: 0    2. Aftercare following left knee joint replacement surgery  -     amoxicillin (AMOXIL) 500 MG capsule; Take 4 capsules by mouth 1 time for 1 dose.  Dispense: 4 capsule; Refill: 0    3. S/P total knee arthroplasty, right  -     amoxicillin (AMOXIL) 500 MG capsule; Take 4 capsules by mouth 1 time for 1 dose.  Dispense: 4 capsule; Refill: 0         Assessment & Plan  Bilateral knee pain:  Continue therapy once a week until comfortable or home exercise equipment is acquired. Consider purchasing a stationary bike or stepper for home use to maintain knee mobility. Utilize a cane for support on uneven ground to prevent falls. Return for follow-up in 09/2025 for x-rays of both knees to assess progress.    Dental prophylaxis:  Prescription for 4 capsules of antibiotics provided, to be taken 1 hour prior to dental cleaning on 06/03/2025. No known allergies to antibiotics.    Follow-up: Follow up in 09/2025. Obtain xrays for bilateral knees at that time.             Tobacco Use: Low Risk  (5/30/2025)    Patient History     Smoking Tobacco Use: Never     Smokeless Tobacco Use: Never     Passive Exposure: Not on file     Patient reports that they are a nonsmoker; cessation education not applicable.            Follow Up   Return in about 4 months (around 9/30/2025).  There are no Patient Instructions on file for this visit.    Patient was given instructions and counseling regarding her condition or for health maintenance advice. Please see specific information pulled into the AVS if appropriate.     Patient or  patient representative verbalized consent for the use of Ambient Listening during the visit with  RICARDO Means for chart documentation. 6/2/2025  07:51 EDT    Dictated Utilizing Dragon Dictation. Please note that portions of this note were completed with a voice recognition program. Part of this note may be an electronic transcription/translation of spoken language to printed text using the Dragon Dictation System.

## (undated) DEVICE — SNAR E/S POLYP SNAREMASTER OVL/10MM 2.8X2300MM YEL

## (undated) DEVICE — STRYKER PERFORMANCE SERIES SAGITTAL BLADE: Brand: STRYKER PERFORMANCE SERIES

## (undated) DEVICE — GAUZE,SPONGE,4"X4",16PLY,STRL,LF,10/TRAY: Brand: MEDLINE

## (undated) DEVICE — NO-SCRATCH ™ SMALL WHITNEY CURETTE ™ IS A SINGLE-USE, PLASTIC CURETTE FOR QUICKLY APPLYING, MANIPULATING AND REMOVING BONE CEMENT DURING HIP AND KNEE REPLACEMENT SURGERY. THE PLASTIC IS SOFTER THAN STEEL INSTRUMENTS, REDUCING THE RISK OF DAMAGING THE PROSTHESIS WITH METAL INSTRUMENTS.  THE CURETTE’S 6MM TIP REMOVES EXCESS CEMENT FROM REPLACEMENT HIPS AND KNEES. EASY-TO-MANEUVER, THE SMALL BLUE CURETTE LETS YOU REMOVE CEMENT FROM ALL EDGES OF THE PROSTHESIS.NO-SCRATCH WHITNEY SMALL CURETTE FEATURES:SAFER THAN STEEL- MADE OF PLASTIC - STURDY YET SOFTER THAN SURGICAL STEEL.HANDIER- EACH TOOL HAS A MOLDED-IN THUMB INDENTATION INSTANTLY ORIENTING THE TOOL.- EASIER TO MANEUVER IN HARD TO SEE PLACES.- COLOR-CODED FOR EASY IDENTIFICATION.FASTER- COMES INDIVIDUALLY PACKAGED IN STERILE, PEEL OPEN POUCH, READY TO GO.- APPLIES, MANIPULATES, OR REMOVES CEMENT WITH FINGERTIP PRECISION.ECONOMICAL- THE COST OF A SINGLE REVISION DWARFS THE COST OF A SINGLE-USE CURETTE. - DISPOSABLE – THERE’S NO NEED TO WASTE TIME REMOVING HARDENED CEMENT OR RE-STERILIZING TOOLS.- LESS EXPENSIVE TO BUY AND INVENTORY - ORDER ONLY THE TOOL YOU USE.- PACKAGED 25 INDIVIDUALLY WRAPPED TOOLS TO A CARTON FOR CONVENIENT SHELF STORAGE.: Brand: WHITNEY NO-SCRATCH CURETTE (SMALL)

## (undated) DEVICE — APPL CHLORAPREP HI/LITE 26ML ORNG

## (undated) DEVICE — SYR LUERLOK 30CC

## (undated) DEVICE — GLV SURG BIOGEL LTX PF 8 1/2

## (undated) DEVICE — GLV SURG BIOGEL LTX PF 7 1/2

## (undated) DEVICE — CVR LEG BOOTLEG F/R NOSKID 33IN

## (undated) DEVICE — INTENDED FOR TISSUE SEPARATION, AND OTHER PROCEDURES THAT REQUIRE A SHARP SURGICAL BLADE TO PUNCTURE OR CUT.: Brand: BARD-PARKER ® CARBON RIB-BACK BLADES

## (undated) DEVICE — SINGLE-USE BIOPSY FORCEPS: Brand: RADIAL JAW 4

## (undated) DEVICE — GLOVE,SURG,SENSICARE SLT,LF,PF,8.5: Brand: MEDLINE

## (undated) DEVICE — GLV SURG SENSICARE PI PF LF 7 GRN STRL

## (undated) DEVICE — BNDG ELAS MATRX V/CLS 6INX10YD LF

## (undated) DEVICE — UNDERCAST PADDING: Brand: DEROYAL

## (undated) DEVICE — Device: Brand: XPERIENCE

## (undated) DEVICE — 3 BONE CEMENT MIXER: Brand: MIXEVAC

## (undated) DEVICE — DRAPE,TOWEL,LARGE,INVISISHIELD: Brand: MEDLINE

## (undated) DEVICE — Device

## (undated) DEVICE — PULLOVER TOGA, 2X LARGE: Brand: FLYTE, SURGICOOL

## (undated) DEVICE — HANDPIECE SET WITH HIGH FLOW TIP AND SUCTION TUBE: Brand: INTERPULSE

## (undated) DEVICE — DISPOSABLE TOURNIQUET CUFF 30"X4", 1-LINE, WHITE, STERILE, 1EA/PK, 10PK/CS: Brand: ASP MEDICAL

## (undated) DEVICE — NEEDLE,18GX1.5",REG,BEVEL: Brand: MEDLINE

## (undated) DEVICE — Device: Brand: DEFENDO AIR/WATER/SUCTION AND BIOPSY VALVE

## (undated) DEVICE — SLV SCD KN/LEN ADJ EXPRSS BLENDED MD 1P/U

## (undated) DEVICE — GLOVE,SURG,SENSICARE SLT,LF,PF,7: Brand: MEDLINE

## (undated) DEVICE — PLASMABLADE PS200-040 4.0: Brand: PLASMABLADE™

## (undated) DEVICE — ANTIBACTERIAL VIOLET BRAIDED (POLYGLACTIN 910), SYNTHETIC ABSORBABLE SURGICAL SUTURE: Brand: COATED VICRYL

## (undated) DEVICE — TOWEL,OR,DSP,ST,BLUE,STD,4/PK,20PK/CS: Brand: MEDLINE

## (undated) DEVICE — SOL IRR NACL 0.9PCT 3000ML

## (undated) DEVICE — TOTAL KNEE-LF: Brand: MEDLINE INDUSTRIES, INC.

## (undated) DEVICE — CONN JET HYDRA H20 AUXILIARY DISP

## (undated) DEVICE — BASIC SINGLE BASIN-LF: Brand: MEDLINE INDUSTRIES, INC.

## (undated) DEVICE — PROXIMATE RH ROTATING HEAD SKIN STAPLERS (35 WIDE) CONTAINS 35 STAINLESS STEEL STAPLES: Brand: PROXIMATE

## (undated) DEVICE — SUT VIC 2/0 CT1 36IN

## (undated) DEVICE — GLOVE,SURG,SENSICARE SLT,LF,PF,6.5: Brand: MEDLINE

## (undated) DEVICE — SOLIDIFIER LIQLOC PLS 1500CC BT

## (undated) DEVICE — SOL IRRG H2O PL/BG 1000ML STRL

## (undated) DEVICE — LINER SURG CANSTR SXN S/RIGD 1500CC

## (undated) DEVICE — THE STERILE LIGHT HANDLE COVER IS USED WITH STERIS SURGICAL LIGHTING AND VISUALIZATION SYSTEMS.

## (undated) DEVICE — THE SINGLE USE ETRAP – POLYP TRAP IS USED FOR SUCTION RETRIEVAL OF ENDOSCOPICALLY REMOVED POLYPS.: Brand: ETRAP

## (undated) DEVICE — MAT FLR ABS W/BLU/LINER 56X72IN WHT

## (undated) DEVICE — SOL IRR NACL 0.9PCT BT 1000ML

## (undated) DEVICE — STERILE POLYISOPRENE POWDER-FREE SURGICAL GLOVES WITH EMOLLIENT COATING: Brand: PROTEXIS